# Patient Record
Sex: FEMALE | Race: AMERICAN INDIAN OR ALASKA NATIVE | NOT HISPANIC OR LATINO | Employment: FULL TIME | URBAN - METROPOLITAN AREA
[De-identification: names, ages, dates, MRNs, and addresses within clinical notes are randomized per-mention and may not be internally consistent; named-entity substitution may affect disease eponyms.]

---

## 2017-01-27 ENCOUNTER — APPOINTMENT (OUTPATIENT)
Dept: RADIOLOGY | Facility: MEDICAL CENTER | Age: 62
DRG: 481 | End: 2017-01-27
Attending: EMERGENCY MEDICINE
Payer: OTHER GOVERNMENT

## 2017-01-27 ENCOUNTER — RESOLUTE PROFESSIONAL BILLING HOSPITAL PROF FEE (OUTPATIENT)
Dept: HOSPITALIST | Facility: MEDICAL CENTER | Age: 62
End: 2017-01-27
Payer: OTHER GOVERNMENT

## 2017-01-27 ENCOUNTER — NON-PROVIDER VISIT (OUTPATIENT)
Dept: OCCUPATIONAL MEDICINE | Facility: CLINIC | Age: 62
End: 2017-01-27
Payer: OTHER GOVERNMENT

## 2017-01-27 ENCOUNTER — HOSPITAL ENCOUNTER (INPATIENT)
Facility: MEDICAL CENTER | Age: 62
LOS: 10 days | DRG: 481 | End: 2017-02-06
Attending: EMERGENCY MEDICINE | Admitting: INTERNAL MEDICINE
Payer: OTHER GOVERNMENT

## 2017-01-27 ENCOUNTER — HOSPITAL ENCOUNTER (OUTPATIENT)
Dept: RADIOLOGY | Facility: MEDICAL CENTER | Age: 62
End: 2017-01-27

## 2017-01-27 ENCOUNTER — APPOINTMENT (OUTPATIENT)
Dept: OCCUPATIONAL MEDICINE | Facility: CLINIC | Age: 62
End: 2017-01-27
Payer: COMMERCIAL

## 2017-01-27 DIAGNOSIS — Z02.83 ENCOUNTER FOR DRUG SCREENING: ICD-10-CM

## 2017-01-27 DIAGNOSIS — S72.92XA CLOSED FRACTURE OF LEFT FEMUR, UNSPECIFIED FRACTURE MORPHOLOGY, UNSPECIFIED PORTION OF FEMUR, INITIAL ENCOUNTER (HCC): ICD-10-CM

## 2017-01-27 PROBLEM — S72.409A FRACTURE OF DISTAL FEMUR (HCC): Status: ACTIVE | Noted: 2017-01-27

## 2017-01-27 LAB
ABO GROUP BLD: NORMAL
ABO GROUP BLD: NORMAL
ANION GAP SERPL CALC-SCNC: 6 MMOL/L (ref 0–11.9)
APTT PPP: 28.3 SEC (ref 24.7–36)
BASOPHILS # BLD AUTO: 0.4 % (ref 0–1.8)
BASOPHILS # BLD: 0.04 K/UL (ref 0–0.12)
BLD GP AB SCN SERPL QL: NORMAL
BUN SERPL-MCNC: 11 MG/DL (ref 8–22)
CALCIUM SERPL-MCNC: 9 MG/DL (ref 8.5–10.5)
CHLORIDE SERPL-SCNC: 105 MMOL/L (ref 96–112)
CO2 SERPL-SCNC: 27 MMOL/L (ref 20–33)
CREAT SERPL-MCNC: 0.59 MG/DL (ref 0.5–1.4)
EKG IMPRESSION: NORMAL
EOSINOPHIL # BLD AUTO: 0.1 K/UL (ref 0–0.51)
EOSINOPHIL NFR BLD: 0.9 % (ref 0–6.9)
ERYTHROCYTE [DISTWIDTH] IN BLOOD BY AUTOMATED COUNT: 42.1 FL (ref 35.9–50)
GFR SERPL CREATININE-BSD FRML MDRD: >60 ML/MIN/1.73 M 2
GLUCOSE BLD-MCNC: 196 MG/DL (ref 65–99)
GLUCOSE SERPL-MCNC: 187 MG/DL (ref 65–99)
HCT VFR BLD AUTO: 43.8 % (ref 37–47)
HGB BLD-MCNC: 14.3 G/DL (ref 12–16)
IMM GRANULOCYTES # BLD AUTO: 0.05 K/UL (ref 0–0.11)
IMM GRANULOCYTES NFR BLD AUTO: 0.5 % (ref 0–0.9)
INR PPP: 0.94 (ref 0.87–1.13)
LYMPHOCYTES # BLD AUTO: 2.66 K/UL (ref 1–4.8)
LYMPHOCYTES NFR BLD: 25 % (ref 22–41)
MCH RBC QN AUTO: 28.9 PG (ref 27–33)
MCHC RBC AUTO-ENTMCNC: 32.6 G/DL (ref 33.6–35)
MCV RBC AUTO: 88.7 FL (ref 81.4–97.8)
MONOCYTES # BLD AUTO: 0.67 K/UL (ref 0–0.85)
MONOCYTES NFR BLD AUTO: 6.3 % (ref 0–13.4)
NEUTROPHILS # BLD AUTO: 7.14 K/UL (ref 2–7.15)
NEUTROPHILS NFR BLD: 66.9 % (ref 44–72)
NRBC # BLD AUTO: 0 K/UL
NRBC BLD AUTO-RTO: 0 /100 WBC
PLATELET # BLD AUTO: 215 K/UL (ref 164–446)
PMV BLD AUTO: 10.7 FL (ref 9–12.9)
POTASSIUM SERPL-SCNC: 3.8 MMOL/L (ref 3.6–5.5)
PROTHROMBIN TIME: 12.9 SEC (ref 12–14.6)
RBC # BLD AUTO: 4.94 M/UL (ref 4.2–5.4)
RH BLD: NORMAL
SODIUM SERPL-SCNC: 138 MMOL/L (ref 135–145)
TSH SERPL DL<=0.005 MIU/L-ACNC: 1.59 UIU/ML (ref 0.3–3.7)
WBC # BLD AUTO: 10.7 K/UL (ref 4.8–10.8)

## 2017-01-27 PROCEDURE — 99285 EMERGENCY DEPT VISIT HI MDM: CPT

## 2017-01-27 PROCEDURE — 770006 HCHG ROOM/CARE - MED/SURG/GYN SEMI*

## 2017-01-27 PROCEDURE — 93005 ELECTROCARDIOGRAM TRACING: CPT | Performed by: EMERGENCY MEDICINE

## 2017-01-27 PROCEDURE — 71010 DX-CHEST-PORTABLE (1 VIEW): CPT

## 2017-01-27 PROCEDURE — 99223 1ST HOSP IP/OBS HIGH 75: CPT | Performed by: INTERNAL MEDICINE

## 2017-01-27 PROCEDURE — 80048 BASIC METABOLIC PNL TOTAL CA: CPT

## 2017-01-27 PROCEDURE — 82962 GLUCOSE BLOOD TEST: CPT

## 2017-01-27 PROCEDURE — A9270 NON-COVERED ITEM OR SERVICE: HCPCS | Performed by: INTERNAL MEDICINE

## 2017-01-27 PROCEDURE — 73700 CT LOWER EXTREMITY W/O DYE: CPT | Mod: LT

## 2017-01-27 PROCEDURE — 86901 BLOOD TYPING SEROLOGIC RH(D): CPT

## 2017-01-27 PROCEDURE — 84443 ASSAY THYROID STIM HORMONE: CPT

## 2017-01-27 PROCEDURE — 96375 TX/PRO/DX INJ NEW DRUG ADDON: CPT

## 2017-01-27 PROCEDURE — 85025 COMPLETE CBC W/AUTO DIFF WBC: CPT

## 2017-01-27 PROCEDURE — 86850 RBC ANTIBODY SCREEN: CPT

## 2017-01-27 PROCEDURE — 86900 BLOOD TYPING SEROLOGIC ABO: CPT

## 2017-01-27 PROCEDURE — 96374 THER/PROPH/DIAG INJ IV PUSH: CPT

## 2017-01-27 PROCEDURE — 700102 HCHG RX REV CODE 250 W/ 637 OVERRIDE(OP): Performed by: INTERNAL MEDICINE

## 2017-01-27 PROCEDURE — 85730 THROMBOPLASTIN TIME PARTIAL: CPT

## 2017-01-27 PROCEDURE — 36415 COLL VENOUS BLD VENIPUNCTURE: CPT

## 2017-01-27 PROCEDURE — 700101 HCHG RX REV CODE 250: Performed by: INTERNAL MEDICINE

## 2017-01-27 PROCEDURE — 304561 HCHG STAT O2

## 2017-01-27 PROCEDURE — 700111 HCHG RX REV CODE 636 W/ 250 OVERRIDE (IP): Performed by: EMERGENCY MEDICINE

## 2017-01-27 PROCEDURE — 99026 IN-HOSPITAL ON CALL SERVICE: CPT | Performed by: INTERNAL MEDICINE

## 2017-01-27 PROCEDURE — 85610 PROTHROMBIN TIME: CPT

## 2017-01-27 PROCEDURE — 304562 HCHG STAT O2 MASK/CANNULA

## 2017-01-27 PROCEDURE — 82075 ASSAY OF BREATH ETHANOL: CPT | Performed by: INTERNAL MEDICINE

## 2017-01-27 RX ORDER — SODIUM CHLORIDE AND POTASSIUM CHLORIDE 150; 900 MG/100ML; MG/100ML
INJECTION, SOLUTION INTRAVENOUS CONTINUOUS
Status: DISCONTINUED | OUTPATIENT
Start: 2017-01-27 | End: 2017-01-31

## 2017-01-27 RX ORDER — MORPHINE SULFATE 4 MG/ML
4 INJECTION, SOLUTION INTRAMUSCULAR; INTRAVENOUS ONCE
Status: COMPLETED | OUTPATIENT
Start: 2017-01-27 | End: 2017-01-27

## 2017-01-27 RX ORDER — LABETALOL HYDROCHLORIDE 5 MG/ML
10 INJECTION, SOLUTION INTRAVENOUS EVERY 4 HOURS PRN
Status: DISCONTINUED | OUTPATIENT
Start: 2017-01-27 | End: 2017-01-31

## 2017-01-27 RX ORDER — MORPHINE SULFATE 10 MG/ML
5 INJECTION, SOLUTION INTRAMUSCULAR; INTRAVENOUS EVERY 4 HOURS PRN
Status: DISCONTINUED | OUTPATIENT
Start: 2017-01-27 | End: 2017-01-31

## 2017-01-27 RX ORDER — PROMETHAZINE HYDROCHLORIDE 25 MG/1
12.5-25 SUPPOSITORY RECTAL EVERY 4 HOURS PRN
Status: DISCONTINUED | OUTPATIENT
Start: 2017-01-27 | End: 2017-02-06 | Stop reason: HOSPADM

## 2017-01-27 RX ORDER — ONDANSETRON 4 MG/1
4 TABLET, ORALLY DISINTEGRATING ORAL EVERY 4 HOURS PRN
Status: DISCONTINUED | OUTPATIENT
Start: 2017-01-27 | End: 2017-02-06 | Stop reason: HOSPADM

## 2017-01-27 RX ORDER — AMLODIPINE BESYLATE 10 MG/1
10 TABLET ORAL DAILY
Status: DISCONTINUED | OUTPATIENT
Start: 2017-01-28 | End: 2017-01-31

## 2017-01-27 RX ORDER — DOCUSATE SODIUM 100 MG/1
100 CAPSULE, LIQUID FILLED ORAL EVERY MORNING
Status: DISCONTINUED | OUTPATIENT
Start: 2017-01-28 | End: 2017-02-01

## 2017-01-27 RX ORDER — ONDANSETRON 2 MG/ML
4 INJECTION INTRAMUSCULAR; INTRAVENOUS EVERY 4 HOURS PRN
Status: DISCONTINUED | OUTPATIENT
Start: 2017-01-27 | End: 2017-02-06 | Stop reason: HOSPADM

## 2017-01-27 RX ORDER — DEXTROSE MONOHYDRATE 25 G/50ML
25 INJECTION, SOLUTION INTRAVENOUS
Status: DISCONTINUED | OUTPATIENT
Start: 2017-01-27 | End: 2017-02-06 | Stop reason: HOSPADM

## 2017-01-27 RX ORDER — NAPROXEN 500 MG/1
500 TABLET ORAL
Status: ON HOLD | COMMUNITY
End: 2017-01-31

## 2017-01-27 RX ORDER — LISINOPRIL 20 MG/1
40 TABLET ORAL DAILY
Status: DISCONTINUED | OUTPATIENT
Start: 2017-01-28 | End: 2017-02-06 | Stop reason: HOSPADM

## 2017-01-27 RX ORDER — IBUPROFEN 200 MG
600 TABLET ORAL EVERY 6 HOURS PRN
Status: ON HOLD | COMMUNITY
End: 2017-01-31

## 2017-01-27 RX ORDER — MORPHINE SULFATE 4 MG/ML
1-4 INJECTION, SOLUTION INTRAMUSCULAR; INTRAVENOUS EVERY 4 HOURS PRN
Status: DISCONTINUED | OUTPATIENT
Start: 2017-01-27 | End: 2017-01-31

## 2017-01-27 RX ORDER — ENEMA 19; 7 G/133ML; G/133ML
1 ENEMA RECTAL
Status: DISCONTINUED | OUTPATIENT
Start: 2017-01-27 | End: 2017-02-01

## 2017-01-27 RX ORDER — PROMETHAZINE HYDROCHLORIDE 25 MG/1
12.5-25 TABLET ORAL EVERY 4 HOURS PRN
Status: DISCONTINUED | OUTPATIENT
Start: 2017-01-27 | End: 2017-02-06 | Stop reason: HOSPADM

## 2017-01-27 RX ORDER — OXYCODONE HYDROCHLORIDE 5 MG/1
5 TABLET ORAL EVERY 4 HOURS PRN
Status: DISCONTINUED | OUTPATIENT
Start: 2017-01-27 | End: 2017-01-31

## 2017-01-27 RX ORDER — AMOXICILLIN 250 MG
1 CAPSULE ORAL NIGHTLY
Status: DISCONTINUED | OUTPATIENT
Start: 2017-01-27 | End: 2017-02-01

## 2017-01-27 RX ORDER — AMOXICILLIN 250 MG
1 CAPSULE ORAL
Status: DISCONTINUED | OUTPATIENT
Start: 2017-01-27 | End: 2017-02-01

## 2017-01-27 RX ORDER — ONDANSETRON 2 MG/ML
4 INJECTION INTRAMUSCULAR; INTRAVENOUS ONCE
Status: COMPLETED | OUTPATIENT
Start: 2017-01-27 | End: 2017-01-27

## 2017-01-27 RX ORDER — TIOTROPIUM BROMIDE 18 UG/1
18 CAPSULE ORAL; RESPIRATORY (INHALATION) DAILY
COMMUNITY
End: 2022-05-31

## 2017-01-27 RX ORDER — GLIMEPIRIDE 2 MG/1
2 TABLET ORAL 2 TIMES DAILY
Status: ON HOLD | COMMUNITY
End: 2017-01-31

## 2017-01-27 RX ORDER — INSULIN GLARGINE 100 [IU]/ML
10 INJECTION, SOLUTION SUBCUTANEOUS EVERY EVENING
Status: DISCONTINUED | OUTPATIENT
Start: 2017-01-27 | End: 2017-02-04

## 2017-01-27 RX ORDER — CALCIUM CARBONATE 500 MG/1
1000 TABLET, CHEWABLE ORAL DAILY
Status: DISCONTINUED | OUTPATIENT
Start: 2017-01-27 | End: 2017-02-06 | Stop reason: HOSPADM

## 2017-01-27 RX ORDER — BISACODYL 10 MG
10 SUPPOSITORY, RECTAL RECTAL
Status: DISCONTINUED | OUTPATIENT
Start: 2017-01-27 | End: 2017-02-01

## 2017-01-27 RX ORDER — GABAPENTIN 100 MG/1
100 CAPSULE ORAL
Status: DISCONTINUED | OUTPATIENT
Start: 2017-01-27 | End: 2017-02-06 | Stop reason: HOSPADM

## 2017-01-27 RX ORDER — ALBUTEROL SULFATE 90 UG/1
2 AEROSOL, METERED RESPIRATORY (INHALATION) EVERY 6 HOURS PRN
COMMUNITY
End: 2022-05-31

## 2017-01-27 RX ORDER — TIOTROPIUM BROMIDE 18 UG/1
1 CAPSULE ORAL; RESPIRATORY (INHALATION)
Status: DISCONTINUED | OUTPATIENT
Start: 2017-01-28 | End: 2017-02-06

## 2017-01-27 RX ORDER — MORPHINE SULFATE 4 MG/ML
1-5 INJECTION, SOLUTION INTRAMUSCULAR; INTRAVENOUS EVERY 4 HOURS PRN
Status: DISCONTINUED | OUTPATIENT
Start: 2017-01-27 | End: 2017-01-27

## 2017-01-27 RX ORDER — LACTULOSE 20 G/30ML
30 SOLUTION ORAL
Status: DISCONTINUED | OUTPATIENT
Start: 2017-01-27 | End: 2017-02-01

## 2017-01-27 RX ADMIN — ONDANSETRON 4 MG: 2 INJECTION, SOLUTION INTRAMUSCULAR; INTRAVENOUS at 16:45

## 2017-01-27 RX ADMIN — MORPHINE SULFATE 4 MG: 4 INJECTION INTRAVENOUS at 16:45

## 2017-01-27 RX ADMIN — INSULIN LISPRO 2 UNITS: 100 INJECTION, SOLUTION INTRAVENOUS; SUBCUTANEOUS at 21:29

## 2017-01-27 RX ADMIN — GABAPENTIN 100 MG: 100 CAPSULE ORAL at 21:26

## 2017-01-27 RX ADMIN — OXYCODONE HYDROCHLORIDE 5 MG: 5 TABLET ORAL at 18:45

## 2017-01-27 RX ADMIN — POTASSIUM CHLORIDE AND SODIUM CHLORIDE: 900; 150 INJECTION, SOLUTION INTRAVENOUS at 19:49

## 2017-01-27 RX ADMIN — Medication 1 TABLET: at 21:26

## 2017-01-27 RX ADMIN — VITAMIN D, TAB 1000IU (100/BT) 1000 UNITS: 25 TAB at 21:26

## 2017-01-27 RX ADMIN — ANTACID TABLETS 1000 MG: 500 TABLET, CHEWABLE ORAL at 21:26

## 2017-01-27 RX ADMIN — INSULIN GLARGINE 10 UNITS: 100 INJECTION, SOLUTION SUBCUTANEOUS at 21:27

## 2017-01-27 RX ADMIN — METOPROLOL TARTRATE 25 MG: 25 TABLET, FILM COATED ORAL at 21:26

## 2017-01-27 ASSESSMENT — PAIN SCALES - GENERAL
PAINLEVEL_OUTOF10: 8
PAINLEVEL_OUTOF10: 3
PAINLEVEL_OUTOF10: 4

## 2017-01-27 ASSESSMENT — LIFESTYLE VARIABLES
EVER_SMOKED: YES
DO YOU DRINK ALCOHOL: NO
DO YOU DRINK ALCOHOL: NO

## 2017-01-27 NOTE — MR AVS SNAPSHOT
Miley Whittington   2017 8:50 AM   Appointment   MRN: [id number]    Department:  [facility]   Dept Phone:  [phone]    Description:  Female : 1955   Provider:  OH NON RENOWN MA           Allergies as of 2017     Allergen Noted Reactions    Sulfa Drugs 2016   Anaphylaxis    Metformin 2017   Shortness of Breath      Vital Signs     Smoking Status                   Former Smoker           Basic Information     Date Of Birth Sex Race Ethnicity Preferred Language    1955 Female  or  Non- English      Problem List              ICD-10-CM Priority Class Noted - Resolved    Sepsis (CMS-HCC) A41.9   2016 - Present    JEN (acute kidney injury) (CMS-HCC) N17.9   2016 - Present    Fracture of distal femur (CMS-HCC) S72.409A   2017 - Present    Leukocytosis D72.829   2017 - Present    BMI 35.0-35.9,adult Z68.35   2017 - Present    DM (diabetes mellitus), type 2 (CMS-HCC) E11.9   2017 - Present    COPD (chronic obstructive pulmonary disease) (CMS-HCC) J44.9   2017 - Present    HTN (hypertension) I10   2017 - Present      Health Maintenance     Patient has no pending health maintenance at this time      Current Immunizations     13-VALENT PCV PREVNAR 2016  2:19 PM    Influenza Vaccine Adult HD 2016    Pneumococcal polysaccharide vaccine (PPSV-23) 2004      Below and/or attached are the medications your provider expects you to take. Review all of your home medications and newly ordered medications with your provider and/or pharmacist. Follow medication instructions as directed by your provider and/or pharmacist. Please keep your medication list with you and share with your provider. Update the information when medications are discontinued, doses are changed, or new medications (including over-the-counter products) are added; and carry medication information at all times in the event of emergency  situations     Allergies:  SULFA DRUGS - Anaphylaxis     METFORMIN - Shortness of Breath               Medications  Valid as of: January 30, 2017 - 11:04 AM    Generic Name Brand Name Tablet Size Instructions for use    Albuterol Sulfate (Aero Soln) albuterol 108 (90 BASE) MCG/ACT Inhale 2 Puffs by mouth every 6 hours as needed for Shortness of Breath.        AmLODIPine Besylate (Tab) NORVASC 10 MG Take 1 Tab by mouth every day.        Gabapentin (Cap) NEURONTIN 100 MG Take 100 mg by mouth every bedtime.        Glimepiride (Tab) AMARYL 2 MG Take 2 mg by mouth 2 times a day.        Ibuprofen (Tab) MOTRIN 200 MG Take 600 mg by mouth every 6 hours as needed for Mild Pain.        Insulin Detemir (Solution) LEVEMIR 100 UNIT/ML Inject 35 Units as instructed every evening.        Lisinopril (Tab) PRINIVIL, ZESTRIL 40 MG Take 1 Tab by mouth every day.        Metoprolol Tartrate   Take 2 Tabs by mouth 2 Times a Day.        Naproxen (Tab) NAPROSYN 500 MG Take 500 mg by mouth every bedtime.        Tiotropium Bromide Monohydrate (Cap) SPIRIVA 18 MCG Inhale 18 mcg by mouth every day.        .                 Medicines prescribed today were sent to:     None      Medication refill instructions:       If your prescription bottle indicates you have medication refills left, it is not necessary to call your provider’s office. Please contact your pharmacy and they will refill your medication.    If your prescription bottle indicates you do not have any refills left, you may request refills at any time through one of the following ways: The online Pa[employer].net system (except Urgent Care), by calling your provider’s office, or by asking your pharmacy to contact your provider’s office with a refill request. Medication refills are processed only during regular business hours and may not be available until the next business day. Your provider may request additional information or to have a follow-up visit with you prior to refilling your  medication.   *Please Note: Medication refills are assigned a new Rx number when refilled electronically. Your pharmacy may indicate that no refills were authorized even though a new prescription for the same medication is available at the pharmacy. Please request the medicine by name with the pharmacy before contacting your provider for a refill.           Rover Access Code: [email or web address]  Expires: 3/1/2017 11:04 AM    Rover  A secure, online tool to manage your health information     [employer]’s Rover® is a secure, online tool that connects you to your personalized health information from the privacy of your home -- day or night - making it very easy for you to manage your healthcare. Once the activation process is completed, you can even access your medical information using the Rover kimmy, which is available for free in the Apple Kimmy store or Google Play store.     Rover provides the following levels of access (as shown below):   My Chart Features   [facility] Primary Care Doctor [facility]  Specialists [facility]  Urgent  Care Non-[facility]  Primary Care  Doctor   Email your healthcare team securely and privately 24/7 X X X    Manage appointments: schedule your next appointment; view details of past/upcoming appointments X      Request prescription refills. X      View recent personal medical records, including lab and immunizations X X X X   View health record, including health history, allergies, medications X X X X   Read reports about your outpatient visits, procedures, consult and ER notes X X X X   See your discharge summary, which is a recap of your hospital and/or ER visit that includes your diagnosis, lab results, and care plan. X X       How to register for Rover:  1. Go to  [email or web address].  2. Click on the Sign Up Now box, which takes you to the New Member Sign Up page. You will need to provide the following information:  a. Enter your Rover Access Code exactly as it appears at the  top of this page. (You will not need to use this code after you’ve completed the sign-up process. If you do not sign up before the expiration date, you must request a new code.)   b. Enter your date of birth.   c. Enter your home email address.   d. Click Submit, and follow the next screen’s instructions.  3. Create a [employer] ID. This will be your [employer] login ID and cannot be changed, so think of one that is secure and easy to remember.  4. Create a [employer] password. You can change your password at any time.  5. Enter your Password Reset Question and Answer. This can be used at a later time if you forget your password.   6. Enter your e-mail address. This allows you to receive e-mail notifications when new information is available in [employer].  7. Click Sign Up. You can now view your health information.    For assistance activating your [employer] account, call [phone]

## 2017-01-27 NOTE — LETTER
"  FORM C-4:  EMPLOYEE’S CLAIM FOR COMPENSATION/ REPORT OF INITIAL TREATMENT  EMPLOYEE’S CLAIM - PROVIDE ALL INFORMATION REQUESTED   First Name  Miley Last Name  Nelsy Birthdate             Age  1955 61 y.o. Sex  Female Claim Number   Home Employee Address  [address]                                     Zip  [address] Height  1.6 m (5' 3\") Weight  90.719 kg (200 lb) [facility]     Mailing Employee Address                           [address] [place] [address]               Zip  [address] Telephone  [phone] (home)  Primary Language Spoken  ENGLISH   Insurer  Unable to Obtain Third Party   Jo-Ann Assigned Risk Employee's Occupation (Job Title) When Injury or Occupational Disease Occurred     Employer's Name  [employer] Telephone  [phone]    Employer Address  [address] [facility] Zip  [address]   Date of Injury         Hour of Injury   Date Employer Notified   Last Day of Work after Injury or Occupational Disease   Supervisor to Whom Injury Reported     Address or Location of Accident (if applicable)     What were you doing at the time of accident? (if applicable)      How did this injury or occupational disease occur? Be specific and answer in detail. Use additional sheet if necessary)     If you believe that you have an occupational disease, when did you first have knowledge of the disability and it relationship to your employment?   Witnesses to the Accident       Nature of Injury or Occupational Disease    Part(s) of Body Injured or Affected  , ,     I certify that the above is true and correct to the best of my knowledge and that I have provided this information in order to obtain the benefits of [place]  I hereby authorize any physician, chiropractor, surgeon, practitioner, or other person, " any hospital, including [facility] or [facility], any medical service organization, any insurance company, or other institution or organization to release to each other, any medical or other information, including benefits paid or payable, pertinent to this injury or disease, except information relative to diagnosis, treatment and/or counseling for AIDS, psychological conditions, alcohol or controlled substances, for which I must give specific authorization.  A Photostat of this authorization shall be as valid as the original.   Date Place   Employee’s Signature   THIS REPORT MUST BE COMPLETED AND MAILED WITHIN 3 WORKING DAYS OF TREATMENT   Place  [facility], EMERGENCY DEPT  Name of Facility   [facility]   Date  1/27/2017 Diagnosis  (S72.92XA) Closed fracture of left femur, unspecified fracture morphology, unspecified portion of femur, initial encounter ([facility]) Is there evidence the injured employee was under the influence of alcohol and/or another controlled substance at the time of accident?   Hour  4:52 PM Description of Injury or Disease  Closed fracture of left femur, unspecified fracture morphology, unspecified portion of femur, initial encounter ([facility]) No   Treatment  Surgery with Ortho service   Have you advised the patient to remain off work five days or more?         No   X-Ray Findings  Positive   If Yes   From Date    To Date      From information given by the employee, together with medical evidence, can you directly connect this injury or occupational disease as job incurred?  Yes If No, is the employee capable of: Full Duty  No Modified Duty      Is additional medical care by a physician indicated?  Yes If Modified Duty, Specify any Limitations / Restrictions        Do you know of any previous injury or disease contributing to this condition or occupational disease?  No   Date  1/27/2017 Print Doctor’s Name  Chaz Lui certify the  "employer’s copy of this form was mailed on:   Address  [address]  [place] [address] [phone] Insurer’s Use Only   [facility]  [id number]    Provider’s Tax ID Number  [id number] Telephone  Dept: [phone]    Doctor’s Signature  zan-CITLALY Cornelius M.D. Degree   M.D.    Original - TREATING PHYSICIAN OR CHIROPRACTOR   Pg 2-Insurer/TPA   Pg 3-Employer   Pg 4-Employee                                                                                                  Form C-4 (rev01/03)     BRIEF DESCRIPTION OF RIGHTS AND BENEFITS  (Pursuant to [place] 616C.[place])    Notice of Injury or Occupational Disease (Incident Report Form C-1): If an injury or occupational disease (OD) arises out of and in the course of employment, you must provide written notice to your employer as soon as practicable, but no later than 7 days after the accident or OD. Your employer shall maintain a sufficient supply of the required forms.    Claim for Compensation (Form C-4): If medical treatment is sought, the form C-4 is available at the place of initial treatment. A completed \"Claim for Compensation\" (Form C-4) must be filed within 90 days after an accident or OD. The treating physician or chiropractor must, within 3 working days after treatment, complete and mail to the employer, the employer's insurer and third-party , the Claim for Compensation.    Medical Treatment: If you require medical treatment for your on-the-job injury or OD, you may be required to select a physician or chiropractor from a list provided by your workers’ compensation insurer, if it has contracted with an Organization for Managed Care (MCO) or Preferred Provider Organization (PPO) or providers of health care. If your employer has not entered into a contract with an MCO or PPO, you may select a physician or chiropractor from the Panel of Physicians and Chiropractors. Any medical costs related to your industrial injury or OD will be " paid by your insurer.    Temporary Total Disability (TTD): If your doctor has certified that you are unable to work for a period of at least 5 consecutive days, or 5 cumulative days in a 20-day period, or places restrictions on you that your employer does not accommodate, you may be entitled to TTD compensation.    Temporary Partial Disability (TPD): If the wage you receive upon reemployment is less than the compensation for TTD to which you are entitled, the insurer may be required to pay you TPD compensation to make up the difference. TPD can only be paid for a maximum of 24 months.    Permanent Partial Disability (PPD): When your medical condition is stable and there is an indication of a PPD as a result of your injury or OD, within 30 days, your insurer must arrange for an evaluation by a rating physician or chiropractor to determine the degree of your PPD. The amount of your PPD award depends on the date of injury, the results of the PPD evaluation and your age and wage.    Permanent Total Disability (PTD): If you are medically certified by a treating physician or chiropractor as permanently and totally disabled and have been granted a PTD status by your insurer, you are entitled to receive monthly benefits not to exceed 66 2/3% of your average monthly wage. The amount of your PTD payments is subject to reduction if you previously received a PPD award.    Vocational Rehabilitation Services: You may be eligible for vocational rehabilitation services if you are unable to return to the job due to a permanent physical impairment or permanent restrictions as a result of your injury or occupational disease.    Transportation and Per Porsha Reimbursement: You may be eligible for travel expenses and per porsha associated with medical treatment.  Reopening: You may be able to reopen your claim if your condition worsens after claim closure.    Appeal Process: If you disagree with a written determination issued by the insurer  or the insurer does not respond to your request, you may appeal to the Department of Administration, , by following the instructions contained in your determination letter. You must appeal the determination within 70 days from the date of the determination letter at [address], [address], [place], [place] [address], or [address], [address], [place], [place] [address]. If you disagree with the  decision, you may appeal to the Department of Administration, . You must file your appeal within 30 days from the date of the  decision letter at [address], [address], [place], [place] [address], or [address], [place], [place] [address]. If you disagree with a decision of an , you may file a petition for judicial review with the District Court. You must do so within 30 days of the Appeal Officer’s decision. You may be represented by an  at your own expense or you may contact the [facility] for possible representation.    [place]  for Injured Workers (NAIW): If you disagree with a  decision, you may request that NAIW represent you without charge at an  Hearing. For information regarding denial of benefits, you may contact the [facility] at: [address], [address], [place] [address], [phone], or [address], [place] [address], [phone]    To File a Complaint with the Division: If you wish to file a complaint with the  of the Division of Industrial Relations (DIR), please contact the Workers’ Compensation Section, [address], [address], [place], [place] [address], telephone ([phone]) [phone], or [address], [place], [place] [address], telephone [phone].    For assistance with Workers’ Compensation Issues: you may contact the Office of the Governor Consumer Health  Assistance, [address], [address], [place], [place] [address], Toll Free 1-311.723.3296, Web site: [email or web address]., E-mail [email or web address]                                                                                                                                                                               __________________________________________________________________                                    _________________            Employee Name / Signature                                                                                                                            Date                                       D-2 (rev. 10/07)

## 2017-01-27 NOTE — ED PROVIDER NOTES
ED Provider Note    Scribed for Chaz Lui M.D. by Mrieya Oconnor. 1/27/2017, 3:43 PM.    Primary care provider: Neftaly Meehan M.D.  Means of arrival: [facility]  History obtained from: Patient  History limited by: None    CHIEF COMPLAINT  Chief Complaint   Patient presents with   • T-5000 GLF     slipped on ice at the car wash. [place] a pop when she fell and couldn't get back up.   • Leg Pain     left       HPI  Miley Whittington is a 61 y.o. female who presents to the Emergency Department brought in by [facility] as a transfer status post ground level fall complaining of left knee pain, onset a couple of hours ago. The patient states that she was at the car wash and slipped on ice. She did hear a pop when she fell and she was unable to stand up after the fall. The patient denies any head trauma or loss of consciousness, hip pain, or ankle pain. The patient has a history of diabetes and hypertension.    REVIEW OF SYSTEMS  See HPI for further details. All other systems are negative.     PAST MEDICAL HISTORY   has a past medical history of Diabetes; Hypertension; and Hyperlipidemia.    SURGICAL HISTORY   has past surgical history that includes cholecystectomy.    SOCIAL HISTORY  Social History   Substance Use Topics   • Smoking status: Heavy Tobacco Smoker -- 0.50 packs/day     Types: Cigarettes   • Smokeless tobacco: Never Used   • Alcohol Use: No      History   Drug Use No       FAMILY HISTORY  No family history noted    CURRENT MEDICATIONS  No current facility-administered medications on file prior to encounter.     Current Outpatient Prescriptions on File Prior to Encounter   Medication Sig Dispense Refill   • metformin (GLUCOPHAGE) 500 MG Tab Take 1 Tab by mouth 2 times a day, with meals. 60 Tab 0   • lisinopril (PRINIVIL, ZESTRIL) 40 MG tablet Take 1 Tab by mouth every day. 30 Tab 0   • metoprolol (LOPRESSOR) 100 MG Tab Take 1 Tab by mouth 2 Times a Day. 60 Tab 0   • amlodipine (NORVASC) 10 MG Tab Take 1 Tab  "by mouth every day. 30 Tab 0   • insulin detemir (LEVEMIR) 100 UNIT/ML Solution Inject 30 Units as instructed every bedtime. 10 mL 0   • gabapentin (NEURONTIN) 100 MG Cap Take 100 mg by mouth every day.     • Tiotropium Bromide Monohydrate (SPIRIVA RESPIMAT INH) Inhale 1 Puff by mouth 2 Times a Day.     Reviewed. See Encounter Summary.     ALLERGIES  Allergies   Allergen Reactions   • Sulfa Drugs Anaphylaxis       PHYSICAL EXAM  VITAL SIGNS: /70 mmHg  Pulse 55  Temp(Src) 36 °C (96.8 °F)  Resp 15  Ht 1.6 m (5' 3\")  Wt 90.719 kg (200 lb)  BMI 35.44 kg/m2  SpO2 100%   Pulse ox interpretation: I interpret this pulse ox as normal.  Constitutional: Alert in no apparent distress.  HENT: No signs of trauma, Bilateral external ears normal, Nose normal.   Eyes: Pupils are equal and reactive, Conjunctiva normal, Non-icteric.   Neck: Normal range of motion, No tenderness, Supple, No stridor. No JVD.  Lymphatic: No lymphadenopathy noted.   Cardiovascular: Regular rate and rhythm, no murmurs.   Thorax & Lungs: Normal breath sounds, No respiratory distress, No wheezing, No chest tenderness.   Abdomen: Bowel sounds normal, Soft, No tenderness, No masses, No pulsatile masses. No peritoneal signs.  Skin: Warm, Dry, No erythema, No rash.   Back: No bony tenderness, No CVA tenderness.   Extremities: Intact distal pulses, No edema, No tenderness,   Musculoskeletal: Good range of motion in all major joints. No tenderness to palpation or major deformities noted.   Neurologic: Alert , Normal motor function, Normal sensory function, No focal deficits noted.   Psychiatric: Affect normal, Judgment normal, Mood normal.     DIAGNOSTIC STUDIES / PROCEDURES     LABS  Results for orders placed or performed during the hospital encounter of 01/27/17   CBC WITH DIFFERENTIAL   Result Value Ref Range    WBC 10.7 4.8 - 10.8 K/uL    RBC 4.94 4.20 - 5.40 M/uL    Hemoglobin 14.3 12.0 - 16.0 g/dL    Hematocrit 43.8 37.0 - 47.0 %    MCV 88.7 " 81.4 - 97.8 fL    MCH 28.9 27.0 - 33.0 pg    MCHC 32.6 (L) 33.6 - 35.0 g/dL    RDW 42.1 35.9 - 50.0 fL    Platelet Count 215 164 - 446 K/uL    MPV 10.7 9.0 - 12.9 fL    Neutrophils-Polys 66.90 44.00 - 72.00 %    Lymphocytes 25.00 22.00 - 41.00 %    Monocytes 6.30 0.00 - 13.40 %    Eosinophils 0.90 0.00 - 6.90 %    Basophils 0.40 0.00 - 1.80 %    Immature Granulocytes 0.50 0.00 - 0.90 %    Nucleated RBC 0.00 /100 WBC    Neutrophils (Absolute) 7.14 2.00 - 7.15 K/uL    Lymphs (Absolute) 2.66 1.00 - 4.80 K/uL    Monos (Absolute) 0.67 0.00 - 0.85 K/uL    Eos (Absolute) 0.10 0.00 - 0.51 K/uL    Baso (Absolute) 0.04 0.00 - 0.12 K/uL    Immature Granulocytes (abs) 0.05 0.00 - 0.11 K/uL    NRBC (Absolute) 0.00 K/uL   EKG (ER)   Result Value Ref Range    Report       [facility] Emergency Dept.    Test Date:  2017  Pt Name:    SUELLEN HERR                  Department: ER  MRN:        [id number]                      Room:        13  Gender:     F                            Technician: [id number]  :        1955                   Requested By:CITLALY LUNA  Order #:    [id number]                    Reading MD:    Measurements  Intervals                                Axis  Rate:       57                           P:          5  OR:         160                          QRS:        -22  QRSD:       80                           T:          3  QT:         428  QTc:        417    Interpretive Statements  SINUS BRADYCARDIA  PROBABLE INFERIOR INFARCT, AGE INDETERMINATE  Compared to ECG 2016 21:26:31  Sinus rhythm no longer present  Left ventricular hypertrophy no longer present  Myocardial infarct finding still present     All labs were reviewed by me.    EKG  EKG Sinus rhythm. No STEMI.     RADIOLOGY  DX-CHEST-PORTABLE (1 VIEW)   Final Result      No acute cardiac or pulmonary abnormality is noted.      OUTSIDE IMAGES-DX LOWER EXTREMITY, LEFT   Preliminary Result      CT-EXTREMITY, LOWER W/O LEFT     (Results Pending)   The radiologist's interpretation of all radiological studies have been reviewed by me.    COURSE & MEDICAL DECISION MAKING  Pertinent Labs & Imaging studies reviewed. (See chart for details)    **Neurovascular intact. D/w ortho staff and requests immobilizer and CT imaging. Will plan for OR tomorrow.     Reviewed patient's medical records from transferring facility which showed the DX left knee showed a distal femur gross displaced and angulated mildly comminuted fracture.     3:43 PM Patient seen and examined at bedside. Ordered for DX chest, prothrombin time, PTT, COD, CBC with differential, BMP, and EKG to evaluate. The patient was informed that the transferring facility performed images that showed a femur fracture. It was discussed with the patient that she will most likely be admitted to the hospital and will require surgery. She was informed that a CT may need to be ordered to evaluate after the case is discussed with the orthopedic surgeon. She was further informed that general lab work will be ordered. She understood and verbalized agreement.    4:11 PM Discussed patient's presentation and findings with Dr. Carlos, orthopedic surgery and they will consult on the patient. Recommend to order a CT, admit to medicine, and place her in an immobilizer.     4:41 PM  Discussed patient's presentation and findings with Dr. Genao and patient admitted to their service for further evaluation and treatment. Patient without any acute distress at time of admission.       DISPOSITION:  Patient will be admitted to Dr. Genao in guarded condition.    FINAL IMPRESSION  1. Closed fracture of left femur, unspecified fracture morphology, unspecified portion of femur, initial encounter ([facility])          Mireya DIETZ (Scribe), am scribing for, and in the presence of, Chaz Lui M.D..    Electronically signed by: Mireya Oconnor (Davidib[place]), 1/27/2017    Chaz DIETZ M.D. personally performed the  services described in this documentation, as scribed by Mireya Oconnor in my presence, and it is both accurate and complete.    The note accurately reflects work and decisions made by me.  Chaz Lui  1/27/2017  5:31 PM

## 2017-01-27 NOTE — LETTER
"  FORM C-4:  EMPLOYEE’S CLAIM FOR COMPENSATION/ REPORT OF INITIAL TREATMENT  EMPLOYEE’S CLAIM - PROVIDE ALL INFORMATION REQUESTED   First Name  Miley Last Name  Nelsy Birthdate             Age  1955 61 y.o. 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 [facility]  [address]   Date of Injury  1/27/2017       Hour of Injury  11:45 AM Date Employer Notified  1/27/2017 Last Day of Work after Injury or Occupational Disease  1/27/2017 Supervisor to Whom Injury Reported  Ho Mcgarry   Address or Location of Accident (if applicable)  [employer] [address] [place] [address]   What were you doing at the time of accident? (if applicable)  Got out of car to use vacuum. Slipped and fell.    How did this injury or occupational disease occur? Be specific and answer in detail. Use additional sheet if necessary)  I was cleaning a company vehicle. It was washed, I was walking around the vehicle to access vacuum when I slipped on ice and fell.   If you believe that you have an occupational disease, when did you first have knowledge of the disability and it relationship to your employment?  N/A Witnesses to the Accident  [address]     Nature of Injury or Occupational Disease  Contusion  Part(s) of Body Injured or Affected  Knee (L), N/A, N/A    I " certify that the above is true and correct to the best of my knowledge and that I have provided this information in order to obtain the benefits of [place]  I hereby authorize any physician, chiropractor, surgeon, practitioner, or other person, any hospital, including [facility] or [facility], any medical service organization, any insurance company, or other institution or organization to release to each other, any medical or other information, including benefits paid or payable, pertinent to this injury or disease, except information relative to diagnosis, treatment and/or counseling for AIDS, psychological conditions, alcohol or controlled substances, for which I must give specific authorization.  A Photostat of this authorization shall be as valid as the original.   Date  01/27/2017 [facility] Employee’s Signature   THIS REPORT MUST BE COMPLETED AND MAILED WITHIN 3 WORKING DAYS OF TREATMENT   Place  [facility], EMERGENCY DEPT  Name of Facility   [facility]   Date  1/27/2017 Diagnosis  (S72.92XA) Closed fracture of left femur, unspecified fracture morphology, unspecified portion of femur, initial encounter ([facility]) Is there evidence the injured employee was under the influence of alcohol and/or another controlled substance at the time of accident?   Hour  5:04 PM Description of Injury or Disease  Closed fracture of left femur, unspecified fracture morphology, unspecified portion of femur, initial encounter ([facility]) No   Treatment  Surgery with Ortho service   Have you advised the patient to remain off work five days or more?         No   X-Ray Findings  Positive   If Yes   From Date    To Date      From information given by the employee, together with medical evidence, can you directly connect this injury or occupational  "disease as job incurred?  Yes If No, is the employee capable of: Full Duty  No Modified Duty      Is additional medical care by a physician indicated?  Yes If Modified Duty, Specify any Limitations / Restrictions        Do you know of any previous injury or disease contributing to this condition or occupational disease?  No   Date  1/27/2017 Print Doctor’s Name  Citlaly Lui certify the employer’s copy of this form was mailed on:   Address  [address] [place] [id number] [phone] Insurer’s Use Only   [facility]  [address]    Provider’s Tax ID Number  [id number] Telephone  Dept: [phone]    Doctor’s Signature  e-CITLALY Cornelius M.D. Degree   M.D.    Original - TREATING PHYSICIAN OR CHIROPRACTOR   Pg 2-Insurer/TPA   Pg 3-Employer   Pg 4-Employee                                                                                                  Form C-4 (rev01/03)     BRIEF DESCRIPTION OF RIGHTS AND BENEFITS  (Pursuant to [place] 616C.050)    Notice of Injury or Occupational Disease (Incident Report Form C-1): If an injury or occupational disease (OD) arises out of and in the course of employment, you must provide written notice to your employer as soon as practicable, but no later than 7 days after the accident or OD. Your employer shall maintain a sufficient supply of the required forms.    Claim for Compensation (Form C-4): If medical treatment is sought, the form C-4 is available at the place of initial treatment. A completed \"Claim for Compensation\" (Form C-4) must be filed within 90 days after an accident or OD. The treating physician or chiropractor must, within 3 working days after treatment, complete and mail to the employer, the employer's insurer and third-party , the Claim for Compensation.    Medical Treatment: If you require medical treatment for your on-the-job injury or OD, you may be required to select a physician or chiropractor from a list provided by your " workers’ compensation insurer, if it has contracted with an Organization for Managed Care (MCO) or Preferred Provider Organization (PPO) or providers of health care. If your employer has not entered into a contract with an MCO or PPO, you may select a physician or chiropractor from the Panel of Physicians and Chiropractors. Any medical costs related to your industrial injury or OD will be paid by your insurer.    Temporary Total Disability (TTD): If your doctor has certified that you are unable to work for a period of at least 5 consecutive days, or 5 cumulative days in a 20-day period, or places restrictions on you that your employer does not accommodate, you may be entitled to TTD compensation.    Temporary Partial Disability (TPD): If the wage you receive upon reemployment is less than the compensation for TTD to which you are entitled, the insurer may be required to pay you TPD compensation to make up the difference. TPD can only be paid for a maximum of 24 months.    Permanent Partial Disability (PPD): When your medical condition is stable and there is an indication of a PPD as a result of your injury or OD, within 30 days, your insurer must arrange for an evaluation by a rating physician or chiropractor to determine the degree of your PPD. The amount of your PPD award depends on the date of injury, the results of the PPD evaluation and your age and wage.    Permanent Total Disability (PTD): If you are medically certified by a treating physician or chiropractor as permanently and totally disabled and have been granted a PTD status by your insurer, you are entitled to receive monthly benefits not to exceed 66 2/3% of your average monthly wage. The amount of your PTD payments is subject to reduction if you previously received a PPD award.    Vocational Rehabilitation Services: You may be eligible for vocational rehabilitation services if you are unable to return to the job due to a permanent physical impairment  or permanent restrictions as a result of your injury or occupational disease.    Transportation and Per Porsha Reimbursement: You may be eligible for travel expenses and per porsha associated with medical treatment.  Reopening: You may be able to reopen your claim if your condition worsens after claim closure.    Appeal Process: If you disagree with a written determination issued by the insurer or the insurer does not respond to your request, you may appeal to the Department of Administration, , by following the instructions contained in your determination letter. You must appeal the determination within 70 days from the date of the determination letter at [address], [address], [place], [place] [address], or [address], [address], [place], [place] [address]. If you disagree with the  decision, you may appeal to the Department of Administration, . You must file your appeal within 30 days from the date of the  decision letter at [address], [address], [place], [place] [address], or [address], [address], [place], [place] [address]. If you disagree with a decision of an , you may file a petition for judicial review with the District Court. You must do so within 30 days of the Appeal Officer’s decision. You may be represented by an  at your own expense or you may contact the [facility] for possible representation.    [place]  for Injured Workers (NAIW): If you disagree with a  decision, you may request that NAIW represent you without charge at an  Hearing. For information regarding denial of benefits, you may contact the [facility] at: [address], [address], [place] [address], [phone]) [phone], or [address], [address], [place] [address], [phone]    To File a Complaint with the Division: If you wish to file a complaint with the  of the  Division of Industrial Relations (DIR), please contact the Workers’ Compensation Section, [address], [address], [place], [place] [address], telephone (547) 132-1477, or [address], [address], [place], [place] [address], telephone [phone]) [phone].    For assistance with Workers’ Compensation Issues: you may contact the Office of the [facility] Consumer Health Assistance, [address] [place] [address], [address], [place], [place] [address], Toll Free 1-704.633.6262, Web site: http://[employer].[facility].nv., E-mail rupa@[email or web address].[email or web address].                                                                                                                                                                               __________________________________________________________________                                       01/27/2017                 Employee Name / Signature                                                                                                                            Date                                       D-2 (rev. 10/07)

## 2017-01-27 NOTE — ED NOTES
Received pt report from [facility]. Pt complains   Chief Complaint   Patient presents with   • T-5000 GLF     slipped on ice at the car wash. [place] a pop when she fell and couldn't get back up.   • Leg Pain     left   Pt currently in traction splint. CMS intact. ERP at bedside.

## 2017-01-28 ENCOUNTER — APPOINTMENT (OUTPATIENT)
Dept: RADIOLOGY | Facility: MEDICAL CENTER | Age: 62
DRG: 481 | End: 2017-01-28
Attending: ORTHOPAEDIC SURGERY
Payer: OTHER GOVERNMENT

## 2017-01-28 PROBLEM — J44.9 COPD (CHRONIC OBSTRUCTIVE PULMONARY DISEASE) (HCC): Status: ACTIVE | Noted: 2017-01-28

## 2017-01-28 PROBLEM — D72.829 LEUKOCYTOSIS: Status: ACTIVE | Noted: 2017-01-28

## 2017-01-28 PROBLEM — E11.9 DM (DIABETES MELLITUS), TYPE 2 (HCC): Status: ACTIVE | Noted: 2017-01-28

## 2017-01-28 PROBLEM — I10 HTN (HYPERTENSION): Status: ACTIVE | Noted: 2017-01-28

## 2017-01-28 LAB
ANION GAP SERPL CALC-SCNC: 4 MMOL/L (ref 0–11.9)
BUN SERPL-MCNC: 12 MG/DL (ref 8–22)
CALCIUM SERPL-MCNC: 9.1 MG/DL (ref 8.5–10.5)
CHLORIDE SERPL-SCNC: 105 MMOL/L (ref 96–112)
CO2 SERPL-SCNC: 28 MMOL/L (ref 20–33)
CREAT SERPL-MCNC: 0.73 MG/DL (ref 0.5–1.4)
ERYTHROCYTE [DISTWIDTH] IN BLOOD BY AUTOMATED COUNT: 43.8 FL (ref 35.9–50)
EST. AVERAGE GLUCOSE BLD GHB EST-MCNC: 183 MG/DL
GFR SERPL CREATININE-BSD FRML MDRD: >60 ML/MIN/1.73 M 2
GLUCOSE BLD-MCNC: 121 MG/DL (ref 65–99)
GLUCOSE BLD-MCNC: 125 MG/DL (ref 65–99)
GLUCOSE BLD-MCNC: 126 MG/DL (ref 65–99)
GLUCOSE BLD-MCNC: 165 MG/DL (ref 65–99)
GLUCOSE BLD-MCNC: 180 MG/DL (ref 65–99)
GLUCOSE SERPL-MCNC: 173 MG/DL (ref 65–99)
HBA1C MFR BLD: 8 % (ref 0–5.6)
HCT VFR BLD AUTO: 38.5 % (ref 37–47)
HGB BLD-MCNC: 12.4 G/DL (ref 12–16)
MCH RBC QN AUTO: 29.2 PG (ref 27–33)
MCHC RBC AUTO-ENTMCNC: 32.2 G/DL (ref 33.6–35)
MCV RBC AUTO: 90.6 FL (ref 81.4–97.8)
PLATELET # BLD AUTO: 186 K/UL (ref 164–446)
PMV BLD AUTO: 10.5 FL (ref 9–12.9)
POTASSIUM SERPL-SCNC: 4.6 MMOL/L (ref 3.6–5.5)
RBC # BLD AUTO: 4.25 M/UL (ref 4.2–5.4)
SODIUM SERPL-SCNC: 137 MMOL/L (ref 135–145)
WBC # BLD AUTO: 14.3 K/UL (ref 4.8–10.8)

## 2017-01-28 PROCEDURE — C1713 ANCHOR/SCREW BN/BN,TIS/BN: HCPCS | Performed by: ORTHOPAEDIC SURGERY

## 2017-01-28 PROCEDURE — 501838 HCHG SUTURE GENERAL: Performed by: ORTHOPAEDIC SURGERY

## 2017-01-28 PROCEDURE — 501480 HCHG STOCKINETTE: Performed by: ORTHOPAEDIC SURGERY

## 2017-01-28 PROCEDURE — A9270 NON-COVERED ITEM OR SERVICE: HCPCS

## 2017-01-28 PROCEDURE — 700111 HCHG RX REV CODE 636 W/ 250 OVERRIDE (IP): Performed by: INTERNAL MEDICINE

## 2017-01-28 PROCEDURE — 500891 HCHG PACK, ORTHO MAJOR: Performed by: ORTHOPAEDIC SURGERY

## 2017-01-28 PROCEDURE — 160035 HCHG PACU - 1ST 60 MINS PHASE I: Performed by: ORTHOPAEDIC SURGERY

## 2017-01-28 PROCEDURE — 700102 HCHG RX REV CODE 250 W/ 637 OVERRIDE(OP): Performed by: INTERNAL MEDICINE

## 2017-01-28 PROCEDURE — 36415 COLL VENOUS BLD VENIPUNCTURE: CPT

## 2017-01-28 PROCEDURE — 73552 X-RAY EXAM OF FEMUR 2/>: CPT | Mod: LT

## 2017-01-28 PROCEDURE — 160036 HCHG PACU - EA ADDL 30 MINS PHASE I: Performed by: ORTHOPAEDIC SURGERY

## 2017-01-28 PROCEDURE — 160039 HCHG SURGERY MINUTES - EA ADDL 1 MIN LEVEL 3: Performed by: ORTHOPAEDIC SURGERY

## 2017-01-28 PROCEDURE — 160009 HCHG ANES TIME/MIN: Performed by: ORTHOPAEDIC SURGERY

## 2017-01-28 PROCEDURE — 160002 HCHG RECOVERY MINUTES (STAT): Performed by: ORTHOPAEDIC SURGERY

## 2017-01-28 PROCEDURE — A4606 OXYGEN PROBE USED W OXIMETER: HCPCS | Performed by: ORTHOPAEDIC SURGERY

## 2017-01-28 PROCEDURE — 80048 BASIC METABOLIC PNL TOTAL CA: CPT

## 2017-01-28 PROCEDURE — A9270 NON-COVERED ITEM OR SERVICE: HCPCS | Performed by: INTERNAL MEDICINE

## 2017-01-28 PROCEDURE — 700105 HCHG RX REV CODE 258

## 2017-01-28 PROCEDURE — 82962 GLUCOSE BLOOD TEST: CPT

## 2017-01-28 PROCEDURE — 85027 COMPLETE CBC AUTOMATED: CPT

## 2017-01-28 PROCEDURE — 160028 HCHG SURGERY MINUTES - 1ST 30 MINS LEVEL 3: Performed by: ORTHOPAEDIC SURGERY

## 2017-01-28 PROCEDURE — 502000 HCHG MISC OR IMPLANTS RC 0278: Performed by: ORTHOPAEDIC SURGERY

## 2017-01-28 PROCEDURE — 770001 HCHG ROOM/CARE - MED/SURG/GYN PRIV*

## 2017-01-28 PROCEDURE — 700111 HCHG RX REV CODE 636 W/ 250 OVERRIDE (IP)

## 2017-01-28 PROCEDURE — 83036 HEMOGLOBIN GLYCOSYLATED A1C: CPT

## 2017-01-28 PROCEDURE — 0QSC04Z REPOSITION LEFT LOWER FEMUR WITH INTERNAL FIXATION DEVICE, OPEN APPROACH: ICD-10-PCS | Performed by: ORTHOPAEDIC SURGERY

## 2017-01-28 PROCEDURE — 700102 HCHG RX REV CODE 250 W/ 637 OVERRIDE(OP)

## 2017-01-28 PROCEDURE — 502240 HCHG MISC OR SUPPLY RC 0272: Performed by: ORTHOPAEDIC SURGERY

## 2017-01-28 PROCEDURE — 99232 SBSQ HOSP IP/OBS MODERATE 35: CPT | Performed by: INTERNAL MEDICINE

## 2017-01-28 PROCEDURE — 700111 HCHG RX REV CODE 636 W/ 250 OVERRIDE (IP): Performed by: ORTHOPAEDIC SURGERY

## 2017-01-28 PROCEDURE — 700101 HCHG RX REV CODE 250

## 2017-01-28 PROCEDURE — 110382 HCHG SHELL REV 271: Performed by: ORTHOPAEDIC SURGERY

## 2017-01-28 PROCEDURE — 110371 HCHG SHELL REV 272: Performed by: ORTHOPAEDIC SURGERY

## 2017-01-28 PROCEDURE — 160048 HCHG OR STATISTICAL LEVEL 1-5: Performed by: ORTHOPAEDIC SURGERY

## 2017-01-28 DEVICE — IMPLANTABLE DEVICE: Type: IMPLANTABLE DEVICE | Status: FUNCTIONAL

## 2017-01-28 DEVICE — SCREW CORTEX SELF TAPPING LOCKING LARGE FRAGMENT SYSTEM 4.5 X 70MM (2TX4=8): Type: IMPLANTABLE DEVICE | Status: FUNCTIONAL

## 2017-01-28 DEVICE — SCREW CORTEX SELF TAPPING LOCKING LARGE FRAGMENT SYSTEM 4.5 X 60MM (2TX4=8): Type: IMPLANTABLE DEVICE | Status: FUNCTIONAL

## 2017-01-28 DEVICE — SCREW CORTEX SELF TAPPING NON-LOCKING LARGE FRAGMENT SYSTEM 4.5 X 85MM (2TX4=8): Type: IMPLANTABLE DEVICE | Status: FUNCTIONAL

## 2017-01-28 DEVICE — SCREW CORTEX SELF TAPPING LOCKING LARGE FRAGMENT SYSTEM 4.5 X 28MM (2TX6=12): Type: IMPLANTABLE DEVICE | Status: FUNCTIONAL

## 2017-01-28 RX ORDER — CEFAZOLIN SODIUM 2 G/100ML
2000 INJECTION, SOLUTION INTRAVENOUS EVERY 8 HOURS
Status: COMPLETED | OUTPATIENT
Start: 2017-01-28 | End: 2017-01-29

## 2017-01-28 RX ORDER — MIDAZOLAM HYDROCHLORIDE 1 MG/ML
INJECTION INTRAMUSCULAR; INTRAVENOUS
Status: DISCONTINUED
Start: 2017-01-28 | End: 2017-01-28

## 2017-01-28 RX ORDER — SODIUM CHLORIDE 9 MG/ML
INJECTION, SOLUTION INTRAVENOUS
Status: COMPLETED
Start: 2017-01-28 | End: 2017-01-28

## 2017-01-28 RX ORDER — OXYCODONE HCL 5 MG/5 ML
SOLUTION, ORAL ORAL
Status: COMPLETED
Start: 2017-01-28 | End: 2017-01-28

## 2017-01-28 RX ADMIN — OXYCODONE HYDROCHLORIDE 5 MG: 5 TABLET ORAL at 20:08

## 2017-01-28 RX ADMIN — Medication 1 TABLET: at 20:00

## 2017-01-28 RX ADMIN — GABAPENTIN 100 MG: 100 CAPSULE ORAL at 20:00

## 2017-01-28 RX ADMIN — AMLODIPINE BESYLATE 10 MG: 10 TABLET ORAL at 07:38

## 2017-01-28 RX ADMIN — OXYCODONE HYDROCHLORIDE 5 MG: 5 SOLUTION ORAL at 12:30

## 2017-01-28 RX ADMIN — OXYCODONE HYDROCHLORIDE 5 MG: 5 TABLET ORAL at 00:54

## 2017-01-28 RX ADMIN — INSULIN LISPRO 2 UNITS: 100 INJECTION, SOLUTION INTRAVENOUS; SUBCUTANEOUS at 20:00

## 2017-01-28 RX ADMIN — SODIUM CHLORIDE 500 ML: 9 INJECTION, SOLUTION INTRAVENOUS at 17:14

## 2017-01-28 RX ADMIN — CEFAZOLIN SODIUM 2000 MG: 2 INJECTION, SOLUTION INTRAVENOUS at 17:13

## 2017-01-28 RX ADMIN — OXYCODONE HYDROCHLORIDE 5 MG: 5 TABLET ORAL at 16:04

## 2017-01-28 RX ADMIN — FENTANYL CITRATE 25 MCG: 50 INJECTION, SOLUTION INTRAMUSCULAR; INTRAVENOUS at 12:40

## 2017-01-28 RX ADMIN — METOPROLOL TARTRATE 25 MG: 25 TABLET, FILM COATED ORAL at 20:06

## 2017-01-28 RX ADMIN — INSULIN LISPRO 2 UNITS: 100 INJECTION, SOLUTION INTRAVENOUS; SUBCUTANEOUS at 16:38

## 2017-01-28 RX ADMIN — FENTANYL CITRATE 25 MCG: 50 INJECTION, SOLUTION INTRAMUSCULAR; INTRAVENOUS at 12:30

## 2017-01-28 RX ADMIN — ENOXAPARIN SODIUM 40 MG: 100 INJECTION SUBCUTANEOUS at 20:00

## 2017-01-28 RX ADMIN — OXYCODONE HYDROCHLORIDE 5 MG: 5 TABLET ORAL at 07:41

## 2017-01-28 RX ADMIN — INSULIN GLARGINE 10 UNITS: 100 INJECTION, SOLUTION SUBCUTANEOUS at 20:00

## 2017-01-28 RX ADMIN — METOPROLOL TARTRATE 25 MG: 25 TABLET, FILM COATED ORAL at 07:38

## 2017-01-28 ASSESSMENT — ENCOUNTER SYMPTOMS
DIARRHEA: 0
NAUSEA: 0
VOMITING: 0
ABDOMINAL PAIN: 0
SHORTNESS OF BREATH: 0

## 2017-01-28 ASSESSMENT — PAIN SCALES - GENERAL
PAINLEVEL_OUTOF10: 5
PAINLEVEL_OUTOF10: 5
PAINLEVEL_OUTOF10: 6
PAINLEVEL_OUTOF10: 3
PAINLEVEL_OUTOF10: 3
PAINLEVEL_OUTOF10: 6
PAINLEVEL_OUTOF10: 8
PAINLEVEL_OUTOF10: 0
PAINLEVEL_OUTOF10: 4
PAINLEVEL_OUTOF10: 5
PAINLEVEL_OUTOF10: 4
PAINLEVEL_OUTOF10: 0
PAINLEVEL_OUTOF10: 4

## 2017-01-28 NOTE — PROGRESS NOTES
"Pt arrived to floor from PACU, calm, cooperative, family at bedside. RN and CNA completed initial assessments. Pt states pain managed at this time, pt is tolerating fluids denies n/v. Post op VS started. Pt has SCD's on for VTE. heels floated for skin integrity.RN encouraged CDB w/ \"I.S.\" will reinforce t/o day. Fall and safety precautions in place, pt uses call light appropriately. Hourly rounds ongoing, call light w/in reach.  "

## 2017-01-28 NOTE — CARE PLAN
Problem: Knowledge Deficit  Goal: Knowledge of disease process/condition, treatment plan, diagnostic tests, and medications will improve  Outcome: PROGRESSING AS EXPECTED  POC discussed with pt. Pt verbalized understanding, all questions answered at this time.    Problem: Pain Management  Goal: Pain level will decrease to patient’s comfort goal  Outcome: PROGRESSING AS EXPECTED  Pt medicated per MAR, reports adequate pain relief with oxy IR 5mg

## 2017-01-28 NOTE — ED NOTES
Spoke to Dr. Carlos regarding taking off traction and applying knee immobilized, no change in orders

## 2017-01-28 NOTE — ED NOTES
Med rec partially complete per patient.  Patient states she is unsure of strength of metoprolol, states she takes 2 tabs BID, last dose this am.  Will have tech follow up with [facility] for dose.  Patient denies taking antibiotics within last month.  Allergies reviewed.

## 2017-01-28 NOTE — PROGRESS NOTES
"Pt A&O x 4, calm, cooperative, pleasant affect. RN reviewed POC which is for continued pain mgt, surgery today. Pt has been NPO since midnight - sips w/ meds this a.m. Pre-op checklist completed. RN encouraged CDB w/ \"I.S.\" return demo given. Pt has SCD's on for VTE. Lovenox to begin post surgery. RN encouraged weight shifts while in bed for skin integrity, pt is independent w/ turns. Fall and safety precautions in place, pt uses call light appropriately. Hourly rounds ongoing, call light w/in reach.   "

## 2017-01-28 NOTE — CONSULTS
"1/27/2017    Miley Whittington is a 61 y.o. female who presents with a left distal femur fracture and is here for operative management. Patient denies numbness, parasthesias, loss of concousness or other trauma    Past Medical History   Diagnosis Date   • Diabetes (CMS-HCC)    • Hypertension    • Hyperlipidemia        Past Surgical History   Procedure Laterality Date   • Cholecystectomy         Medications  No current facility-administered medications on file prior to encounter.     Current Outpatient Prescriptions on File Prior to Encounter   Medication Sig Dispense Refill   • lisinopril (PRINIVIL, ZESTRIL) 40 MG tablet Take 1 Tab by mouth every day. 30 Tab 0   • amlodipine (NORVASC) 10 MG Tab Take 1 Tab by mouth every day. 30 Tab 0   • gabapentin (NEURONTIN) 100 MG Cap Take 100 mg by mouth every bedtime.         Allergies  Sulfa drugs and Metformin    ROS  Left leg pain. All other systems were reviewed and found to be negative    History reviewed. No pertinent family history.    Social History     Social History   • Marital Status: Single     Spouse Name: N/A   • Number of Children: N/A   • Years of Education: N/A     Social History Main Topics   • Smoking status: Former Smoker -- 0.50 packs/day     Types: Cigarettes     Quit date: 07/27/2016   • Smokeless tobacco: Never Used   • Alcohol Use: No   • Drug Use: No   • Sexual Activity: Not Asked     Other Topics Concern   • None     Social History Narrative       Physical Exam  Vitals  Blood pressure 142/71, pulse 58, temperature 36.3 °C (97.4 °F), resp. rate 18, height 1.6 m (5' 3\"), weight 90.719 kg (200 lb), SpO2 99 %.  General: Well Developed, Well Nourished, no acute distress  HEENT: Normocephalic, atraumatic  Eyes: Anicteric, PERRLA, EOMI  Neck: Supple, nontender, no masses  Lungs: CTA, no wheezes or crackles  Heart: RRR, no murmurs, rubs or gallops  Abdomen: Soft, NT, ND  Pelvis: Stable to AP and Lateral Compression  Skin: Intact, no open wounds  Extremities: left " leg pain and deformity  Neuro: NVI  Vascular: 2+DP/PT, Capillary refill <2 seconds    Radiographs:  DX-CHEST-PORTABLE (1 VIEW)   Final Result      No acute cardiac or pulmonary abnormality is noted.      OUTSIDE IMAGES-DX LOWER EXTREMITY, LEFT   Preliminary Result      CT-EXTREMITY, LOWER W/O LEFT    (Results Pending)       Laboratory Values  Recent Labs      01/27/17   1600   WBC  10.7   RBC  4.94   HEMOGLOBIN  14.3   HEMATOCRIT  43.8   MCV  88.7   MCH  28.9   MCHC  32.6*   RDW  42.1   PLATELETCT  215   MPV  10.7     Recent Labs      01/27/17   1600   SODIUM  138   POTASSIUM  3.8   CHLORIDE  105   CO2  27   GLUCOSE  187*   BUN  11     Recent Labs      01/27/17   1600   APTT  28.3   INR  0.94         Impression: Left distal Femur fracture    Plan:Operative intervention. Risks and benefits of surgery were discussed which include but are not limited to bleeding, infection, neurovascular damage, malunion, nonunion, instability, limb length discrepancy, DVT, PE, MI, Stroke and death. They understand these risks and wish to proceed.

## 2017-01-28 NOTE — ED NOTES
Traction splint taken off pt and knee mobilizer put into place on the left leg. CMS remains intact. Pt has motor and denies a loss of feeling to the left extremity. Pt medicated for pain per MAR prior to procedure. Pt family at bedside. Pt now waiting for CT.

## 2017-01-28 NOTE — H&P
REASON FOR ADMISSION:  Left distal femur fracture.    HISTORY OF PRESENT ILLNESS:  The patient is a 61-year-old female with diabetes   mellitus and high blood pressure.  She was washing her car at the [employer]   today and slipped on ice, unfortunately sustaining a left distal femur   fracture.  Dr. Ulysses Carlos has been consulted.  CT is pending.    The fall was strictly mechanical in nature.  She was in her normal state of   health leading up to it.  There has been no chest pain, cough, upper   respiratory infection, nausea, vomiting, diarrhea, constipation, dysuria,   black or bloody stools, focal neurologic deficit, skin rash, hearing changes,   vision changes, or easy bruising.    PAST MEDICAL HISTORY:  1.  Diabetes mellitus.  2.  Hypertension.  3.  Dyslipidemia.  4.  COPD.    PAST SURGICAL HISTORY:  Cholecystectomy.    SOCIAL HISTORY:  She quit smoking last year.  She is a nondrinker.  She is   followed by Dr. KIARA Meehan.    FAMILY HISTORY:  Reviewed, but noncontributory.    CURRENT MEDICATIONS:  1.  Norvasc 10 mg daily.  2.  Neurontin 100 mg 3 times daily.  3.  Levemir insulin 30 units nightly.  4.  Lisinopril 40 mg daily.  5.  Metformin 500 mg twice daily.  6.  Metoprolol 100 mg twice daily.  7.  Spiriva 1 puff daily.    ALLERGIES:  SULFA.    REVIEW OF SYSTEMS:  As in the HPI.    PHYSICAL EXAMINATION:  VITAL SIGNS:  Blood pressure 163/79, pulse 64, and respiratory rate 20.  She   is afebrile.  GENERAL:  She is alert, oriented, accompanied by her granddaughter.  HEENT:  Pupils are equal, round and reactive.  Extraocular movements intact.    Mucous membranes are moist.  NECK:  Supple without jugular venous distention, lymphadenopathy or bruit.  CARDIOVASCULAR:  She is regular without murmur, rub or gallop.  LUNGS:  Clear to auscultation bilaterally.  ABDOMEN:  Obese, soft, nontender, nondistended.  Bowel sounds are present.    There is no palpable organomegaly.  BACK:  No CVA tenderness.  EXTREMITIES:  Warm.   The left leg is in a splint, the foot is warm and pulses   are good distally.  NEUROLOGIC:  Nonfocal.    LABORATORY DATA:  White count 11, hemoglobin 14, hematocrit 44, mean cell   volume is 89, platelets 250, neutrophils 67%, lymphocytes 25%, monocytes 6%.    Sodium 138, potassium 3.8, chloride 105, bicarbonate 27, glucose 187, BUN 11,   creatinine 0.6, calcium 9.0.  INR 0.9.    STUDIES:  1.  Chest x-ray is negative.  2.  X-rays of the left femur note distal femur fracture.    IMPRESSION:  1.  Mechanical ground level fall.  2.  Distal left femur fracture.  3.  Diabetes mellitus, insulin dependent, poorly controlled.  4.  Hypertension, poorly controlled.  5.  Dyslipidemia.  6.  Chronic obstructive pulmonary disease.  7.  Obesity.    PLAN:  1.  For the left femur fracture.  She will be held n.p.o. after midnight.  CT   of the hip and femur is pending.  We will provide calcium and vitamin D.  The   prophylaxis against DVT is Lovenox.  Provide appropriate analgesia.  Brace is   in place.  2.  Diabetes mellitus.  We will provide Levemir and sliding scale coverage.    Hold metformin.  3.  Hypertension.  We will continue the home regimen.  Provide PRNs.  4.  Chronic obstructive pulmonary disease.  We will provide Spiriva,   aerosolized bronchodilators and supplemental oxygen as needed.  5.  Prophylaxis.  Lovenox, laxatives.       ____________________________________     MD CAYLA ASTUDILLO / HERNANDO    DD:  01/27/2017 17:04:38  DT:  01/27/2017 19:17:34    D#:  [id number]  Job#:  [id number]    cc: Neftaly Meehan MD, CHRISTINA ALLEN MD

## 2017-01-28 NOTE — PROGRESS NOTES
"Seen and examined, asked to assume care by Dr. Carlos.  Left distal femur fracture from ground level fall.  No antecedent pain.    Blood pressure 121/50, pulse 60, temperature 35.9 °C (96.7 °F), resp. rate 16, height 1.6 m (5' 3\"), weight 90.719 kg (200 lb), SpO2 87 %, not currently breastfeeding.    Exam:  LLE fires TA/GS/EHL/P, sensation intact S/S/SP/DP/T, 2+ DP on left, skin OK    #1 Left distal femur fracture    Plan:  - To OR for ORIF  - NPO  - TTWB post op  "

## 2017-01-28 NOTE — PROGRESS NOTES
Hospital Medicine Progress Note, Adult, Complex               Author: Roxana Walsh Date & Time created: 1/28/2017  7:54 AM     Interval History:  62 YO female w/ Obesity, DM2 (on Amaryl and Levimir), HTN (on Norvasc, metoprolol, lisnopril), COPD (on Spiriva and Albuterol), admitted 7/2016 w/ Sepsis. She fell on ice 1/27 and sustained a distal L femur Fx, CT shows comminuted, displaced somewhat longitudinal Fx. She is seen s/p PACU post ORIF by Dr Tenorio. Daughter at bedside. Patient Prefers SNF in [place] for rehab, SNF order placed.     Review of Systems:  Review of Systems   Respiratory: Negative for shortness of breath.    Cardiovascular: Negative for chest pain.   Gastrointestinal: Negative for nausea, vomiting, abdominal pain and diarrhea.   Genitourinary: Negative for dysuria.   Musculoskeletal:        Left leg pain, reasonably controlled       Physical Exam:  Physical Exam   Constitutional: She is oriented to person, place, and time. She appears well-developed and well-nourished. No distress.   HENT:   Head: Normocephalic and atraumatic.   Eyes: EOM are normal. Pupils are equal, round, and reactive to light. Right eye exhibits no discharge. Left eye exhibits no discharge.   Neck: Neck supple.   Cardiovascular: Normal rate and regular rhythm.    Pulmonary/Chest: Effort normal and breath sounds normal.   Abdominal: Soft. Bowel sounds are normal. She exhibits no distension. There is no tenderness.   Neurological: She is alert and oriented to person, place, and time.   Skin: Skin is warm and dry. She is not diaphoretic.   Psychiatric: She has a normal mood and affect.   Nursing note and vitals reviewed.      Labs:        Invalid input(s): [id number]      Recent Labs      01/27/17   1600  01/28/17   0204   SODIUM  138  137   POTASSIUM  3.8  4.6   CHLORIDE  105  105   CO2  27 28   BUN  11  12   CREATININE  0.59  0.73   CALCIUM  9.0  9.1     Recent Labs      01/27/17   1600  01/28/17   0204   GLUCOSE   187*  173*     Recent Labs      17   1600  17   0204   RBC  4.94  4.25   HEMOGLOBIN  14.3  12.4   HEMATOCRIT  43.8  38.5   PLATELETCT  215  186   PROTHROMBTM  12.9   --    APTT  28.3   --    INR  0.94   --      Recent Labs      17   1600  17   0204   WBC  10.7  14.3*   NEUTSPOLYS  66.90   --    LYMPHOCYTES  25.00   --    MONOCYTES  6.30   --    EOSINOPHILS  0.90   --    BASOPHILS  0.40   --            Hemodynamics:  Temp (24hrs), Av.3 °C (97.4 °F), Min:35.9 °C (96.7 °F), Max:36.9 °C (98.4 °F)  Temperature: 36.9 °C (98.4 °F)  Pulse  Av.3  Min: 51  Max: 86Heart Rate (Monitored): (!) 57  Blood Pressure: 130/71 mmHg, NIBP: 142/71 mmHg     Respiratory:    Respiration: 17, Pulse Oximetry: 99 %           Fluids:    Intake/Output Summary (Last 24 hours) at 17 0754  Last data filed at 17 0742   Gross per 24 hour   Intake    572 ml   Output    300 ml   Net    272 ml     Weight: 90.719 kg (200 lb)  GI/Nutrition:  Orders Placed This Encounter   Procedures   • DIET NPO     Standing Status: Standing      Number of Occurrences: 1      Standing Expiration Date:      Order Specific Question:  Restrict to:     Answer:  Sips with Medications [3]     Medical Decision Making, by Problem:  Active Hospital Problems    Diagnosis   • Leukocytosis [D72.829] probably reactive CBC in AM   • BMI 35.0-35.9,adult [Z68.35]   • DM (diabetes mellitus), type 2 (CMS-HCC) [E11.9] diet SSI, lantus- near goal   • COPD (chronic obstructive pulmonary disease) (CMS-HCC) [J44.9]   • HTN (hypertension) [I10]   • Fracture of distal femur LEFT (CMS-HCC) [S72.409A] ORIF plate and screws  (Walker) TTWB       Labs reviewed and Medications reviewed  Burroughs catheter: One or Two Days Post Surgery (Day of Surgery being Day 0)      DVT Prophylaxis: Enoxaparin (Lovenox)    Ulcer prophylaxis: Not indicated    Assessed for rehab: Patient was assess for and/or received rehabilitation services during this  hospitalization

## 2017-01-28 NOTE — FACE TO FACE
Face to Face Note  -  Durable Medical Equipment    Will Lagunas M.D. - NPI: [id number]  I certify that this patient is under my care and that they had a durable medical equipment(DME)face to face encounter by myself that meets the physician DME face-to-face encounter requirements with this patient on:    Date of encounter:   Patient:                    MRN:                       YOB: 2019  Miley Whittington  [id number]  1955     The encounter with the patient was in whole, or in part, for the following medical condition, which is the primary reason for durable medical equipment:  Post-Op Surgery    I certify that, based on my findings, the following durable medical equipment is medically necessary:  Walkers and Wheel Chair.    HOME O2 Saturation Measurements:(Values must be present for Home Oxygen orders)         ,     ,         My Clinical findings support the need for the above equipment due to:  Abnormal Gait, Bedbound/non-weight bearing, Other - post op left distal femur replacement    Supporting Symptoms: nonweightbearing to left leg, status post ORIF left distal femur fracture

## 2017-01-28 NOTE — PROGRESS NOTES
Pt arrived to floor from ER via gurney at 1800.  Pt AA/O X4. LS clear. On 2L NC. VSS. HRR. BS+ bm x1 early this am per pt. Burroughs cath to d/d with clear yellow urine. CMS+ MARIN. NWB to LLE. Left leg immobilizer in place. IV to lac saline lock. Family at bedside. Discusses hourly rounding and POC, pt agreeable.  Bed alarm in place for safety. Pt oriented to room, bed controls, skylight and POC. Call light at bedside and within reach. This Rn spoke with Dr. Alexy Moffett, no plan for sx tonight, diabetic diet ordered.    2 RN skin assessment performed. Skin intact. No lesions or wounds noted.

## 2017-01-28 NOTE — OP REPORT
DATE OF SERVICE:  01/28/2017    PREOPERATIVE DIAGNOSIS:  Left intercondylar, supracondylar distal femur   fracture.    POSTOPERATIVE DIAGNOSIS:  Left intercondylar, supracondylar distal femur   fracture.    PROCEDURE:  Open reduction internal fixation of left supracondylar,   intercondylar distal femur fracture.    SURGEON:  Tl Tenorio MD    ASSISTANT:  Will Lagunas MD    ANESTHESIOLOGIST:  Rajesh Keen MD    ANESTHESIA:  General.    SPECIMENS:  None.    ESTIMATED BLOOD LOSS:  100 mL.    COMPLICATIONS:  None.    OPERATIVE INDICATIONS:  The patient is a very pleasant 61-year-old female who   suffered a ground level fall and subsequent left intraarticular distal femur   fracture.  Radiographs demonstrated a comminuted metaphyseal, supracondylar   femur fracture with nondisplaced intracondylar extension.  She has a normal   neurovascular exam and a normal skin envelope.  Given these findings, she is   an appropriately indicated candidate for open reduction internal fixation of   her femur fracture.  I discussed the risks, benefits, and alternatives with   the patient including the risk of infection, wound healing complications,   neurovascular injury, blood loss, DVT, PE, malunion, nonunion, plate   stiffness, symptomatic hardware as well as the medical risk of anesthesia   including MI, stroke, and death.  Discussed alternatives of procedure   including nonoperative management.  Discussed benefits of the proposed   procedure including early mobilization, early range of motion of the knee and   improved chance of healing, acceptable alignment.  She is in agreement with   the plan to proceed and her informed consent was signed and documented in the   medical record.  I met with her preoperatively and marked the operative   extremity with her agreement.  We proceeded to the operating room at [facility].    OPERATIVE COURSE:  She underwent general anesthesia with Dr. Keen, was   positioned supine with a bump  under the left buttock and wrap to the left leg   on the flat OSI table.  The left lower extremity was then prepped and draped   in sterile orthopedic fashion using ChloraPrep and the surgical team scrubbed   in.  A procedural pause was conducted to verify correct patient, correct   extremity, presence of the surgeon's initials on the operative extremity, and   administration of IV antibiotics in this case Ancef.  Following generalized   agreement, a lateral approach to the distal femur was performed incising the   skin and subcutaneous tissue sharply.  We dissected down to the fascia and   split this in line with our incision curving it anteriorly to distal end of   the incision.  We identified the fracture site and took care not to dissect in   the area of comminution.  The intercondylar split was palpated and the   lateral and anterior arthrotomy was made.  We then placed a periarticular   reduction forceps through a small medial incision across the intercondylar   split through across the condyle to prevent displace in the articular split.    We then drilled for and placed one 3.5 mm bicortical nonlocking screw in   position mode, securing the intercondylar split.  We then selected a 19-hole   Smith and Nephew locking lateral distal femur plate and split this up the   femur proximally.  We then applied traction and rotation to the lower   extremity to affect the reduction.  The Smith and Nephew plate was then   secured distally with one 4.5 mm bicortical nonlocking screw, which compressed   screw through the bone.  We then retrieved the plate proximally with a small   counterincision by the hip.  Prior to insertion of the plate, the proximal   aspect of the plate was contoured with tabletop beltran, which was conformed to   the greater trochanter.  This portion of the plate was then secured to the   proximal portion of the femur using a 4.5 mm bicortical nonlocking screw.  We   confirmed our reduction on AP and  lateral fluoroscopy.  We were quite   satisfied with our reduction and plate length.  We then secured the fracture   with additional 4.5 mm locking screws distally drilling all holes in the   distal aspect of the plate.  Proximally, we placed additional nonlocking   screws to spread our construct.  A 4.5 mm unicortical screw was also placed   within the femoral neck and head to protect the proximal aspect of the femur.    Final fluoroscopic images were then obtained, demonstrating a near anatomic   reduction of the fracture and good position of all hardware.  We then   thoroughly irrigated the wound with copious amounts of normal saline and   closed in layers with #1 Vicryl in the fascia, 2-0 Vicryl in subcutaneous   tissue and staples in the skin.  Sterile dressings were applied.  Patient was   transferred to the recovery room in stable condition.  There were no   complications.    Using Will Lagunas MD as a surgical assistant was necessary for assistance   with exposure, retraction, fracture reduction, instrumentation, and closure.    POSTOPERATIVE PLAN:  1.  Toe touch weightbearing left lower extremity, begin range of motion on   postop day #2 if wound appear to be in good condition, knee immobilizer at all   times until that time.  2.  DVT prophylaxis with SCDs and Lovenox in the hospital, transition to   aspirin as an outpatient.  3.  IV antibiotic prophylaxis - no additional required.  4.  Discharge planning.       ____________________________________     MD PAULIE Villa / HERNANDO    DD:  01/28/2017 11:40:05  DT:  01/28/2017 13:18:18    D#:  [id number]  Job#:  [id number]

## 2017-01-28 NOTE — ED NOTES
Called in report to [facility] on [id number]. Pt currently at CT. Will be transported to floor once CT complete.

## 2017-01-29 LAB
AMORPH CRY #/AREA URNS HPF: PRESENT /HPF
APPEARANCE UR: ABNORMAL
BASOPHILS # BLD AUTO: 0.3 % (ref 0–1.8)
BASOPHILS # BLD: 0.04 K/UL (ref 0–0.12)
BILIRUB UR QL STRIP.AUTO: NEGATIVE
COLOR UR: YELLOW
EOSINOPHIL # BLD AUTO: 0.06 K/UL (ref 0–0.51)
EOSINOPHIL NFR BLD: 0.4 % (ref 0–6.9)
EPI CELLS #/AREA URNS HPF: NORMAL /HPF
ERYTHROCYTE [DISTWIDTH] IN BLOOD BY AUTOMATED COUNT: 43.6 FL (ref 35.9–50)
GLUCOSE BLD-MCNC: 138 MG/DL (ref 65–99)
GLUCOSE BLD-MCNC: 156 MG/DL (ref 65–99)
GLUCOSE BLD-MCNC: 166 MG/DL (ref 65–99)
GLUCOSE BLD-MCNC: 171 MG/DL (ref 65–99)
GLUCOSE UR STRIP.AUTO-MCNC: 100 MG/DL
HCT VFR BLD AUTO: 33.8 % (ref 37–47)
HGB BLD-MCNC: 11 G/DL (ref 12–16)
IMM GRANULOCYTES # BLD AUTO: 0.05 K/UL (ref 0–0.11)
IMM GRANULOCYTES NFR BLD AUTO: 0.3 % (ref 0–0.9)
KETONES UR STRIP.AUTO-MCNC: 40 MG/DL
LEUKOCYTE ESTERASE UR QL STRIP.AUTO: NEGATIVE
LYMPHOCYTES # BLD AUTO: 2.06 K/UL (ref 1–4.8)
LYMPHOCYTES NFR BLD: 13.8 % (ref 22–41)
MCH RBC QN AUTO: 29.6 PG (ref 27–33)
MCHC RBC AUTO-ENTMCNC: 32.5 G/DL (ref 33.6–35)
MCV RBC AUTO: 90.9 FL (ref 81.4–97.8)
MICRO URNS: ABNORMAL
MONOCYTES # BLD AUTO: 1.37 K/UL (ref 0–0.85)
MONOCYTES NFR BLD AUTO: 9.2 % (ref 0–13.4)
MUCOUS THREADS #/AREA URNS HPF: NORMAL /HPF
NEUTROPHILS # BLD AUTO: 11.31 K/UL (ref 2–7.15)
NEUTROPHILS NFR BLD: 76 % (ref 44–72)
NITRITE UR QL STRIP.AUTO: NEGATIVE
NRBC # BLD AUTO: 0 K/UL
NRBC BLD AUTO-RTO: 0 /100 WBC
PH UR STRIP.AUTO: 7 [PH]
PLATELET # BLD AUTO: 149 K/UL (ref 164–446)
PMV BLD AUTO: 10.4 FL (ref 9–12.9)
PROT UR QL STRIP: NEGATIVE MG/DL
RBC # BLD AUTO: 3.72 M/UL (ref 4.2–5.4)
RBC # URNS HPF: NORMAL /HPF
RBC UR QL AUTO: NEGATIVE
SP GR UR STRIP.AUTO: 1.01
WBC # BLD AUTO: 14.9 K/UL (ref 4.8–10.8)

## 2017-01-29 PROCEDURE — 700111 HCHG RX REV CODE 636 W/ 250 OVERRIDE (IP): Performed by: INTERNAL MEDICINE

## 2017-01-29 PROCEDURE — 700111 HCHG RX REV CODE 636 W/ 250 OVERRIDE (IP): Performed by: ORTHOPAEDIC SURGERY

## 2017-01-29 PROCEDURE — 82962 GLUCOSE BLOOD TEST: CPT | Mod: 91

## 2017-01-29 PROCEDURE — 97166 OT EVAL MOD COMPLEX 45 MIN: CPT

## 2017-01-29 PROCEDURE — A9270 NON-COVERED ITEM OR SERVICE: HCPCS | Performed by: INTERNAL MEDICINE

## 2017-01-29 PROCEDURE — 97162 PT EVAL MOD COMPLEX 30 MIN: CPT

## 2017-01-29 PROCEDURE — 81001 URINALYSIS AUTO W/SCOPE: CPT

## 2017-01-29 PROCEDURE — G8987 SELF CARE CURRENT STATUS: HCPCS | Mod: CL

## 2017-01-29 PROCEDURE — G8988 SELF CARE GOAL STATUS: HCPCS | Mod: CI

## 2017-01-29 PROCEDURE — 700102 HCHG RX REV CODE 250 W/ 637 OVERRIDE(OP): Performed by: INTERNAL MEDICINE

## 2017-01-29 PROCEDURE — 36415 COLL VENOUS BLD VENIPUNCTURE: CPT

## 2017-01-29 PROCEDURE — 770001 HCHG ROOM/CARE - MED/SURG/GYN PRIV*

## 2017-01-29 PROCEDURE — 700112 HCHG RX REV CODE 229: Performed by: INTERNAL MEDICINE

## 2017-01-29 PROCEDURE — 85025 COMPLETE CBC W/AUTO DIFF WBC: CPT

## 2017-01-29 PROCEDURE — G8978 MOBILITY CURRENT STATUS: HCPCS | Mod: CL

## 2017-01-29 PROCEDURE — G8979 MOBILITY GOAL STATUS: HCPCS | Mod: CJ

## 2017-01-29 PROCEDURE — 99232 SBSQ HOSP IP/OBS MODERATE 35: CPT | Performed by: INTERNAL MEDICINE

## 2017-01-29 RX ORDER — CYCLOBENZAPRINE HCL 10 MG
10 TABLET ORAL 3 TIMES DAILY PRN
Status: DISCONTINUED | OUTPATIENT
Start: 2017-01-29 | End: 2017-01-31

## 2017-01-29 RX ORDER — CYCLOBENZAPRINE HCL 10 MG
5 TABLET ORAL TWICE DAILY
Status: DISCONTINUED | OUTPATIENT
Start: 2017-01-29 | End: 2017-01-29

## 2017-01-29 RX ADMIN — OXYCODONE HYDROCHLORIDE 5 MG: 5 TABLET ORAL at 22:47

## 2017-01-29 RX ADMIN — GABAPENTIN 100 MG: 100 CAPSULE ORAL at 21:11

## 2017-01-29 RX ADMIN — ENOXAPARIN SODIUM 40 MG: 100 INJECTION SUBCUTANEOUS at 21:12

## 2017-01-29 RX ADMIN — OXYCODONE HYDROCHLORIDE 5 MG: 5 TABLET ORAL at 08:43

## 2017-01-29 RX ADMIN — INSULIN LISPRO 2 UNITS: 100 INJECTION, SOLUTION INTRAVENOUS; SUBCUTANEOUS at 21:22

## 2017-01-29 RX ADMIN — INSULIN LISPRO 2 UNITS: 100 INJECTION, SOLUTION INTRAVENOUS; SUBCUTANEOUS at 17:37

## 2017-01-29 RX ADMIN — INSULIN GLARGINE 10 UNITS: 100 INJECTION, SOLUTION SUBCUTANEOUS at 21:22

## 2017-01-29 RX ADMIN — METOPROLOL TARTRATE 25 MG: 25 TABLET, FILM COATED ORAL at 08:43

## 2017-01-29 RX ADMIN — INSULIN LISPRO 2 UNITS: 100 INJECTION, SOLUTION INTRAVENOUS; SUBCUTANEOUS at 12:19

## 2017-01-29 RX ADMIN — METOPROLOL TARTRATE 25 MG: 25 TABLET, FILM COATED ORAL at 21:12

## 2017-01-29 RX ADMIN — AMLODIPINE BESYLATE 10 MG: 10 TABLET ORAL at 08:42

## 2017-01-29 RX ADMIN — VITAMIN D, TAB 1000IU (100/BT) 1000 UNITS: 25 TAB at 08:42

## 2017-01-29 RX ADMIN — OXYCODONE HYDROCHLORIDE 5 MG: 5 TABLET ORAL at 14:37

## 2017-01-29 RX ADMIN — CEFAZOLIN SODIUM 2000 MG: 2 INJECTION, SOLUTION INTRAVENOUS at 02:25

## 2017-01-29 RX ADMIN — LISINOPRIL 40 MG: 20 TABLET ORAL at 08:42

## 2017-01-29 RX ADMIN — DOCUSATE SODIUM 100 MG: 100 CAPSULE ORAL at 08:42

## 2017-01-29 RX ADMIN — OXYCODONE HYDROCHLORIDE 5 MG: 5 TABLET ORAL at 03:56

## 2017-01-29 RX ADMIN — Medication 1 TABLET: at 21:11

## 2017-01-29 RX ADMIN — ANTACID TABLETS 1000 MG: 500 TABLET, CHEWABLE ORAL at 08:42

## 2017-01-29 ASSESSMENT — ACTIVITIES OF DAILY LIVING (ADL): TOILETING: INDEPENDENT

## 2017-01-29 ASSESSMENT — ENCOUNTER SYMPTOMS
DIARRHEA: 0
VOMITING: 0
SHORTNESS OF BREATH: 0
ABDOMINAL PAIN: 0
NAUSEA: 0

## 2017-01-29 ASSESSMENT — PAIN SCALES - GENERAL
PAINLEVEL_OUTOF10: 0
PAINLEVEL_OUTOF10: 7
PAINLEVEL_OUTOF10: 6

## 2017-01-29 ASSESSMENT — GAIT ASSESSMENTS: GAIT LEVEL OF ASSIST: UNABLE TO PARTICIPATE

## 2017-01-29 NOTE — THERAPY
"Occupational Therapy Evaluation completed.   Functional Status:  Pt seen for OT eval due to recent L distal femur fx. Previously independent in all ADLs/IADLs and lives alone. Currently requires Max A for ADLs.   Plan of Care: Will benefit from Occupational Therapy 3 times per week  Discharge Recommendations:  Equipment: Will Continue to Assess for Equipment Needs. Post-acute therapy recommended before discharged home.    See \"Rehab Therapy-Acute\" Patient Summary Report for complete documentation.    "

## 2017-01-29 NOTE — RESPIRATORY CARE
COPD EDUCATION by COPD CLINICAL EDUCATOR  1/29/2017 at 8:58 AM by Angie Metz     Patient reviewed by COPD education team. Patient does not qualify for COPD program.

## 2017-01-29 NOTE — PROGRESS NOTES
Resting comfortably, tolerating PO without N/V. Incentive Spirometer demonstrates 1500ml. Minimal pain  With resting in bed, will monitor

## 2017-01-29 NOTE — PROGRESS NOTES
"   Orthopaedic Progress Note    Interval changes:  Patient doing well post op  Muscle spasm issues -flexeril 5mg PO BID added    ROS - Patient denies any new issues.  Pain well controlled.    Blood pressure 125/72, pulse 78, temperature 37.1 °C (98.7 °F), resp. rate 16, height 1.6 m (5' 3\"), weight 90.719 kg (200 lb), SpO2 92 %, not currently breastfeeding.      Patient seen and examined  No acute distress  Breathing non labored  RRR  LLE surgical dressing is clean, dry, and intact. Patient clearly fires tibialis anterior, EHL, and gastrocnemius/soleus. Sensation is intact to light touch throughout superficial peroneal, deep peroneal, tibial, saphenous, and sural nerve distributions. Strong and palpable 2+ dorsalis pedis and posterior tibial pulses with capillary refill less than 2 seconds. No lower leg tenderness or discomfort.      Recent Labs      01/27/17   1600  01/28/17   0204  01/29/17   0157   WBC  10.7  14.3*  14.9*   RBC  4.94  4.25  3.72*   HEMOGLOBIN  14.3  12.4  11.0*   HEMATOCRIT  43.8  38.5  33.8*   MCV  88.7  90.6  90.9   MCH  28.9  29.2  29.6   MCHC  32.6*  32.2*  32.5*   RDW  42.1  43.8  43.6   PLATELETCT  215  186  149*   MPV  10.7  10.5  10.4       Active Hospital Problems    Diagnosis   • Leukocytosis [D72.829]   • BMI 35.0-35.9,adult [Z68.35]   • DM (diabetes mellitus), type 2 (CMS-HCC) [E11.9]   • COPD (chronic obstructive pulmonary disease) (CMS-HCC) [J44.9]   • HTN (hypertension) [I10]   • Fracture of distal femur (CMS-HCC) [S72.409A]       Assessment/Plan:  Patient doing well post op  POD#1 S/P Open reduction internal fixation of left supracondylar,    intercondylar distal femur fracture.  Wt bearing status - TTWB LLE  Wound care/Drains - dressing change tomorrow  Future Procedures - none planned   Lovenox: Start 1/27, Duration-until ambulatory > 150'  Sutures/Staples out- 10-14 days post operatively  PT/OT-initiated  Antibiotics: completed   DVT Prophylaxis- TEDS/SCDs/Foot " pumps  Burroughs-none   Case Coordination for Discharge Planning - Disposition home vs SNF

## 2017-01-29 NOTE — PROGRESS NOTES
Applied knee immobilizer to pt's LLE. +cms pre/post brace application. Pt has been instructed to call Rn for any questions or discomfort.

## 2017-01-29 NOTE — THERAPY
"Physical Therapy Evaluation completed.   Bed Mobility:  Supine to Sit: Moderate Assist  Transfers: Sit to Stand: Maximal Assist  Gait: Level Of Assist: Unable to Participate   Plan of Care: Will benefit from Physical Therapy 3 times per week  Discharge Recommendations: Equipment: Front-Wheel Walker. Post-acute therapy recommended before discharged home.    See \"Rehab Therapy-Acute\" Patient Summary Report for complete documentation.     "

## 2017-01-29 NOTE — PROGRESS NOTES
Hospital Medicine Progress Note, Adult, Complex               Author: Roxana Walsh Date & Time created: 1/29/2017  2:04 PM     Interval History:  62 YO female w/ Obesity, DM2 (on Amaryl and Levimir), HTN (on Norvasc, metoprolol, lisnopril), COPD (on Spiriva and Albuterol), admitted 7/2016 w/ Sepsis. She fell on ice 1/27 and sustained a distal L femur Fx, CT shows comminuted, displaced somewhat longitudinal Fx. She is seen s/p PACU post ORIF by Dr Tenorio.  Having muscle cramps both legs, requests muscle relaxer.     Review of Systems:  Review of Systems   Respiratory: Negative for shortness of breath.    Cardiovascular: Negative for chest pain.   Gastrointestinal: Negative for nausea, vomiting, abdominal pain and diarrhea.   Genitourinary: Negative for dysuria.   Musculoskeletal:        Left leg pain, now bilateral leg cramps       Physical Exam:  Physical Exam   Constitutional: She is oriented to person, place, and time. She appears well-developed and well-nourished. No distress.   HENT:   Head: Normocephalic and atraumatic.   Eyes: EOM are normal. Pupils are equal, round, and reactive to light. Right eye exhibits no discharge. Left eye exhibits no discharge.   Neck: Neck supple.   Cardiovascular: Normal rate and regular rhythm.    Pulmonary/Chest: Effort normal and breath sounds normal.   Abdominal: Soft. Bowel sounds are normal. She exhibits no distension. There is no tenderness.   Neurological: She is alert and oriented to person, place, and time.   Skin: Skin is warm and dry. She is not diaphoretic.   Psychiatric: She has a normal mood and affect.   Nursing note and vitals reviewed.      Labs:        Invalid input(s): [id number]      Recent Labs      01/27/17   1600  01/28/17   0204   SODIUM  138  137   POTASSIUM  3.8  4.6   CHLORIDE  105  105   CO2  27 28   BUN  11  12   CREATININE  0.59  0.73   CALCIUM  9.0  9.1     Recent Labs      01/27/17   1600  01/28/17   0204   GLUCOSE  187*  173*     Recent  Labs      17   1600  17   0204  17   0157   RBC  4.94  4.25  3.72*   HEMOGLOBIN  14.3  12.4  11.0*   HEMATOCRIT  43.8  38.5  33.8*   PLATELETCT  215  186  149*   PROTHROMBTM  12.9   --    --    APTT  28.3   --    --    INR  0.94   --    --      Recent Labs      17   1600  17   0204  17   015   WBC  10.7  14.3*  14.9*   NEUTSPOLYS  66.90   --   76.00*   LYMPHOCYTES  25.00   --   13.80*   MONOCYTES  6.30   --   9.20   EOSINOPHILS  0.90   --   0.40   BASOPHILS  0.40   --   0.30           Hemodynamics:  Temp (24hrs), Av.3 °C (97.4 °F), Min:35.9 °C (96.6 °F), Max:37.1 °C (98.7 °F)  Temperature: 37.1 °C (98.7 °F)  Pulse  Av  Min: 51  Max: 91   Blood Pressure: 125/72 mmHg     Respiratory:    Respiration: 16, Pulse Oximetry: 92 %           Fluids:    Intake/Output Summary (Last 24 hours) at 17 1404  Last data filed at 17 0500   Gross per 24 hour   Intake      0 ml   Output   1300 ml   Net  -1300 ml        GI/Nutrition:  Orders Placed This Encounter   Procedures   • DIET ORDER     Standing Status: Standing      Number of Occurrences: 1      Standing Expiration Date:      Order Specific Question:  Diet:     Answer:  Diabetic [3]     Medical Decision Making, by Problem:  Active Hospital Problems    Diagnosis   • Leukocytosis [D72.829] no change- check UA if UTI was POA as Leukocytosis was  POA   • BMI 35.0-35.9,adult [Z68.35]   • DM (diabetes mellitus), type 2 (CMS-HCC) [E11.9] diet SSI, lantus- @ goal   • COPD (chronic obstructive pulmonary disease) (CMS-HCC) [J44.9]   • HTN (hypertension) [I10] @ goal   • Fracture of distal femur LEFT (CMS-HCC) [S72.409A] ORIF plate and screws  (Walker) TTWB, flexeril for crmaps, probably dispo to SNF   DC peres    Labs reviewed and Medications reviewed  Peres Catheter: ordered dc.      DVT Prophylaxis: Enoxaparin (Lovenox)    Ulcer prophylaxis: Not indicated    Assessed for rehab: Patient was assess for and/or received  rehabilitation services during this hospitalization

## 2017-01-30 LAB
BREATH ALCOHOL COMMENT: NORMAL
GLUCOSE BLD-MCNC: 130 MG/DL (ref 65–99)
GLUCOSE BLD-MCNC: 177 MG/DL (ref 65–99)
GLUCOSE BLD-MCNC: 178 MG/DL (ref 65–99)
GLUCOSE BLD-MCNC: 217 MG/DL (ref 65–99)
POC BREATHALIZER: 0 PERCENT (ref 0–0.01)

## 2017-01-30 PROCEDURE — 700111 HCHG RX REV CODE 636 W/ 250 OVERRIDE (IP): Performed by: INTERNAL MEDICINE

## 2017-01-30 PROCEDURE — 700102 HCHG RX REV CODE 250 W/ 637 OVERRIDE(OP): Performed by: INTERNAL MEDICINE

## 2017-01-30 PROCEDURE — A9270 NON-COVERED ITEM OR SERVICE: HCPCS | Performed by: INTERNAL MEDICINE

## 2017-01-30 PROCEDURE — 82962 GLUCOSE BLOOD TEST: CPT | Mod: 91

## 2017-01-30 PROCEDURE — 99232 SBSQ HOSP IP/OBS MODERATE 35: CPT | Performed by: INTERNAL MEDICINE

## 2017-01-30 PROCEDURE — 770006 HCHG ROOM/CARE - MED/SURG/GYN SEMI*

## 2017-01-30 PROCEDURE — 700112 HCHG RX REV CODE 229: Performed by: INTERNAL MEDICINE

## 2017-01-30 PROCEDURE — 51798 US URINE CAPACITY MEASURE: CPT

## 2017-01-30 RX ADMIN — INSULIN GLARGINE 10 UNITS: 100 INJECTION, SOLUTION SUBCUTANEOUS at 22:29

## 2017-01-30 RX ADMIN — DOCUSATE SODIUM 100 MG: 100 CAPSULE ORAL at 08:55

## 2017-01-30 RX ADMIN — ENOXAPARIN SODIUM 40 MG: 100 INJECTION SUBCUTANEOUS at 22:27

## 2017-01-30 RX ADMIN — INSULIN LISPRO 2 UNITS: 100 INJECTION, SOLUTION INTRAVENOUS; SUBCUTANEOUS at 17:09

## 2017-01-30 RX ADMIN — Medication 1 TABLET: at 22:28

## 2017-01-30 RX ADMIN — OXYCODONE HYDROCHLORIDE 5 MG: 5 TABLET ORAL at 11:16

## 2017-01-30 RX ADMIN — AMLODIPINE BESYLATE 10 MG: 10 TABLET ORAL at 08:55

## 2017-01-30 RX ADMIN — INSULIN LISPRO 3 UNITS: 100 INJECTION, SOLUTION INTRAVENOUS; SUBCUTANEOUS at 22:29

## 2017-01-30 RX ADMIN — OXYCODONE HYDROCHLORIDE 5 MG: 5 TABLET ORAL at 22:28

## 2017-01-30 RX ADMIN — OXYCODONE HYDROCHLORIDE 5 MG: 5 TABLET ORAL at 05:14

## 2017-01-30 RX ADMIN — VITAMIN D, TAB 1000IU (100/BT) 1000 UNITS: 25 TAB at 08:54

## 2017-01-30 RX ADMIN — INSULIN LISPRO 2 UNITS: 100 INJECTION, SOLUTION INTRAVENOUS; SUBCUTANEOUS at 11:12

## 2017-01-30 RX ADMIN — GABAPENTIN 100 MG: 100 CAPSULE ORAL at 22:28

## 2017-01-30 RX ADMIN — ANTACID TABLETS 1000 MG: 500 TABLET, CHEWABLE ORAL at 08:55

## 2017-01-30 RX ADMIN — METOPROLOL TARTRATE 25 MG: 25 TABLET, FILM COATED ORAL at 22:27

## 2017-01-30 RX ADMIN — LISINOPRIL 40 MG: 20 TABLET ORAL at 08:54

## 2017-01-30 RX ADMIN — METOPROLOL TARTRATE 25 MG: 25 TABLET, FILM COATED ORAL at 08:55

## 2017-01-30 ASSESSMENT — PAIN SCALES - GENERAL
PAINLEVEL_OUTOF10: 7
PAINLEVEL_OUTOF10: 4
PAINLEVEL_OUTOF10: 0
PAINLEVEL_OUTOF10: 4
PAINLEVEL_OUTOF10: 4
PAINLEVEL_OUTOF10: 5
PAINLEVEL_OUTOF10: 5

## 2017-01-30 ASSESSMENT — ENCOUNTER SYMPTOMS
NAUSEA: 0
SHORTNESS OF BREATH: 0
DIARRHEA: 0
VOMITING: 0
ABDOMINAL PAIN: 0

## 2017-01-30 NOTE — DISCHARGE PLANNING
TCN met with patient at bedside to discuss the care teams recommendation for SNF. Patient is agreeable suggestion she lives in [place] and believes workmans comp would cover her SNF needs patient selected [facility] as first choice and then opted for blanket referral to all local [place] and [place] SNFs. Choice signed and faxed to [facility]. TCN to follow as needed for DC planning.

## 2017-01-30 NOTE — PROGRESS NOTES
Burroughs removed, will monitor for voiding. Patient calls for assist, up to chair, PO pain medications. Right le with dressing cdi. Patient is slow to move. 2 person assist.

## 2017-01-30 NOTE — PROGRESS NOTES
Radha LOWERY was called out during business hours to ER for a post accident UDS and BAT on 1/27/17 for [place].    UDS collected at Unable to collect.  BAT collected at 4:58pm.

## 2017-01-30 NOTE — PROGRESS NOTES
Received report on pt, pt resting comfortably in bed, no complaints of pain, 2L nasal cannula, awaiting first void post peres removal, immobilizer on LLE.

## 2017-01-30 NOTE — CARE PLAN
Problem: Safety  Goal: Will remain free from falls  Outcome: PROGRESSING AS EXPECTED  Pt remained free from falls during shift. Pt oriented to call light, bed in low position and wheels locked. Pt encouraged to call for assistance.

## 2017-01-30 NOTE — PROGRESS NOTES
"   Orthopaedic Progress Note    Interval changes:  Dressing changed   Cleared for DC to SNF pending medicine clearance and placement    ROS - Patient denies any new issues.  Pain well controlled.    Blood pressure 152/76, pulse 88, temperature 36.8 °C (98.3 °F), resp. rate 16, height 1.6 m (5' 3\"), weight 90.719 kg (200 lb), SpO2 98 %, not currently breastfeeding.      Patient seen and examined  No acute distress  Breathing non labored  RRR  LLE surgical dressings changed,  surgical incisions are well approximated and are dry and clean.  There is no erythema, induration, or signs of infection at any of the incision sites.  Patient clearly fires tibialis anterior, EHL, and gastrocnemius/soleus. Sensation is intact to light touch throughout superficial peroneal, deep peroneal, tibial, saphenous, and sural nerve distributions. Strong and palpable 2+ dorsalis pedis and posterior tibial pulses with capillary refill less than 2 seconds. No lower leg tenderness or discomfort.      Recent Labs      01/27/17   1600  01/28/17   0204  01/29/17   0157   WBC  10.7  14.3*  14.9*   RBC  4.94  4.25  3.72*   HEMOGLOBIN  14.3  12.4  11.0*   HEMATOCRIT  43.8  38.5  33.8*   MCV  88.7  90.6  90.9   MCH  28.9  29.2  29.6   MCHC  32.6*  32.2*  32.5*   RDW  42.1  43.8  43.6   PLATELETCT  215  186  149*   MPV  10.7  10.5  10.4       Active Hospital Problems    Diagnosis   • Leukocytosis [D72.829]   • BMI 35.0-35.9,adult [Z68.35]   • DM (diabetes mellitus), type 2 (CMS-HCC) [E11.9]   • COPD (chronic obstructive pulmonary disease) (CMS-HCC) [J44.9]   • HTN (hypertension) [I10]   • Fracture of distal femur (CMS-HCC) [S72.409A]       Assessment/Plan:  Dressing changed incisions without complication  POD#2 S/P Open reduction internal fixation of left supracondylar,    intercondylar distal femur fracture.  Wt bearing status - TTWB LLE  Wound care/Drains - dressing changed nursing to change ever other day  Future Procedures - none planned "   Lovenox: Start 1/27, Duration-until ambulatory > 150'  Sutures/Staples out- 14 days post operatively  PT/OT-initiated  Antibiotics: completed   DVT Prophylaxis- TEDS/SCDs/Foot pumps  Burroughs-none   Case Coordination for Discharge Planning - Disposition SNF

## 2017-01-30 NOTE — DISCHARGE PLANNING
Olga at [facility] called to let us know patient's workers comp case was closed in 2012. MANUELA Mason notified.

## 2017-01-30 NOTE — PROGRESS NOTES
Called [facility] @ [phone]. Left message for them to call back on [phone].

## 2017-01-31 LAB
ERYTHROCYTE [DISTWIDTH] IN BLOOD BY AUTOMATED COUNT: 40.8 FL (ref 35.9–50)
GLUCOSE BLD-MCNC: 138 MG/DL (ref 65–99)
GLUCOSE BLD-MCNC: 158 MG/DL (ref 65–99)
GLUCOSE BLD-MCNC: 188 MG/DL (ref 65–99)
HCT VFR BLD AUTO: 31.6 % (ref 37–47)
HGB BLD-MCNC: 10.2 G/DL (ref 12–16)
MCH RBC QN AUTO: 28.5 PG (ref 27–33)
MCHC RBC AUTO-ENTMCNC: 32.3 G/DL (ref 33.6–35)
MCV RBC AUTO: 88.3 FL (ref 81.4–97.8)
PLATELET # BLD AUTO: 182 K/UL (ref 164–446)
PMV BLD AUTO: 10.7 FL (ref 9–12.9)
RBC # BLD AUTO: 3.58 M/UL (ref 4.2–5.4)
WBC # BLD AUTO: 14.7 K/UL (ref 4.8–10.8)

## 2017-01-31 PROCEDURE — 85027 COMPLETE CBC AUTOMATED: CPT

## 2017-01-31 PROCEDURE — 700102 HCHG RX REV CODE 250 W/ 637 OVERRIDE(OP): Performed by: INTERNAL MEDICINE

## 2017-01-31 PROCEDURE — 82962 GLUCOSE BLOOD TEST: CPT

## 2017-01-31 PROCEDURE — 700105 HCHG RX REV CODE 258: Performed by: HOSPITALIST

## 2017-01-31 PROCEDURE — A9270 NON-COVERED ITEM OR SERVICE: HCPCS | Performed by: INTERNAL MEDICINE

## 2017-01-31 PROCEDURE — 99233 SBSQ HOSP IP/OBS HIGH 50: CPT | Performed by: HOSPITALIST

## 2017-01-31 PROCEDURE — A9270 NON-COVERED ITEM OR SERVICE: HCPCS | Performed by: HOSPITALIST

## 2017-01-31 PROCEDURE — 700112 HCHG RX REV CODE 229: Performed by: INTERNAL MEDICINE

## 2017-01-31 PROCEDURE — 700111 HCHG RX REV CODE 636 W/ 250 OVERRIDE (IP): Performed by: INTERNAL MEDICINE

## 2017-01-31 PROCEDURE — 770006 HCHG ROOM/CARE - MED/SURG/GYN SEMI*

## 2017-01-31 PROCEDURE — 700102 HCHG RX REV CODE 250 W/ 637 OVERRIDE(OP): Performed by: HOSPITALIST

## 2017-01-31 PROCEDURE — 36415 COLL VENOUS BLD VENIPUNCTURE: CPT

## 2017-01-31 RX ORDER — CARVEDILOL 6.25 MG/1
9.38 TABLET ORAL 2 TIMES DAILY WITH MEALS
Status: DISCONTINUED | OUTPATIENT
Start: 2017-01-31 | End: 2017-02-03

## 2017-01-31 RX ORDER — CYCLOBENZAPRINE HCL 10 MG
5 TABLET ORAL 3 TIMES DAILY PRN
Status: DISCONTINUED | OUTPATIENT
Start: 2017-01-31 | End: 2017-02-04

## 2017-01-31 RX ORDER — CYCLOBENZAPRINE HCL 5 MG
5 TABLET ORAL 3 TIMES DAILY PRN
Qty: 30 TAB | Refills: 0 | Status: SHIPPED | DISCHARGE
Start: 2017-01-31 | End: 2017-02-04

## 2017-01-31 RX ORDER — ACETAMINOPHEN 325 MG/1
650 TABLET ORAL EVERY 6 HOURS PRN
Status: DISCONTINUED | OUTPATIENT
Start: 2017-01-31 | End: 2017-02-05

## 2017-01-31 RX ORDER — OXYCODONE HYDROCHLORIDE 5 MG/1
5 TABLET ORAL EVERY 4 HOURS PRN
Status: DISCONTINUED | OUTPATIENT
Start: 2017-01-31 | End: 2017-02-04

## 2017-01-31 RX ORDER — OXYCODONE HYDROCHLORIDE 5 MG/1
5 TABLET ORAL EVERY 4 HOURS PRN
Qty: 30 TAB | Refills: 0 | Status: SHIPPED | DISCHARGE
Start: 2017-01-31 | End: 2017-02-06

## 2017-01-31 RX ORDER — ALENDRONATE SODIUM 10 MG/1
10 TABLET ORAL
Status: DISCONTINUED | OUTPATIENT
Start: 2017-01-31 | End: 2017-02-06 | Stop reason: HOSPADM

## 2017-01-31 RX ORDER — ACETAMINOPHEN 325 MG/1
650 TABLET ORAL EVERY 6 HOURS PRN
Qty: 30 TAB | Refills: 0 | Status: SHIPPED | DISCHARGE
Start: 2017-01-31 | End: 2022-05-31

## 2017-01-31 RX ORDER — CARVEDILOL 3.12 MG/1
9.38 TABLET ORAL 2 TIMES DAILY WITH MEALS
Qty: 60 TAB | Status: SHIPPED | DISCHARGE
Start: 2017-01-31 | End: 2017-02-06

## 2017-01-31 RX ORDER — SODIUM CHLORIDE 9 MG/ML
INJECTION, SOLUTION INTRAVENOUS CONTINUOUS
Status: DISCONTINUED | OUTPATIENT
Start: 2017-01-31 | End: 2017-02-04

## 2017-01-31 RX ORDER — ALENDRONATE SODIUM 10 MG/1
10 TABLET ORAL
Qty: 30 TAB | Status: SHIPPED | DISCHARGE
Start: 2017-01-31 | End: 2022-05-31

## 2017-01-31 RX ORDER — METOPROLOL TARTRATE 50 MG/1
100 TABLET, FILM COATED ORAL 2 TIMES DAILY
Status: ON HOLD | COMMUNITY
End: 2017-01-31

## 2017-01-31 RX ADMIN — INSULIN LISPRO 2 UNITS: 100 INJECTION, SOLUTION INTRAVENOUS; SUBCUTANEOUS at 20:52

## 2017-01-31 RX ADMIN — Medication 1 TABLET: at 21:00

## 2017-01-31 RX ADMIN — INSULIN LISPRO 2 UNITS: 100 INJECTION, SOLUTION INTRAVENOUS; SUBCUTANEOUS at 12:14

## 2017-01-31 RX ADMIN — TIOTROPIUM BROMIDE 1 CAPSULE: 18 CAPSULE ORAL; RESPIRATORY (INHALATION) at 10:42

## 2017-01-31 RX ADMIN — DOCUSATE SODIUM 100 MG: 100 CAPSULE ORAL at 10:19

## 2017-01-31 RX ADMIN — CARVEDILOL 9.38 MG: 6.25 TABLET, FILM COATED ORAL at 10:19

## 2017-01-31 RX ADMIN — ALENDRONATE SODIUM 10 MG: 10 TABLET ORAL at 10:19

## 2017-01-31 RX ADMIN — OXYCODONE HYDROCHLORIDE 5 MG: 5 TABLET ORAL at 16:31

## 2017-01-31 RX ADMIN — ENOXAPARIN SODIUM 40 MG: 100 INJECTION SUBCUTANEOUS at 20:46

## 2017-01-31 RX ADMIN — OXYCODONE HYDROCHLORIDE 5 MG: 5 TABLET ORAL at 03:05

## 2017-01-31 RX ADMIN — INSULIN GLARGINE 10 UNITS: 100 INJECTION, SOLUTION SUBCUTANEOUS at 20:52

## 2017-01-31 RX ADMIN — LACTULOSE 30 ML: 10 SOLUTION ORAL at 06:46

## 2017-01-31 RX ADMIN — OXYCODONE HYDROCHLORIDE 5 MG: 5 TABLET ORAL at 20:46

## 2017-01-31 RX ADMIN — CALCIUM CARBONATE-CHOLECALCIFEROL TAB 250 MG-125 UNIT 1 TABLET: 250-125 TAB at 10:19

## 2017-01-31 RX ADMIN — OXYCODONE HYDROCHLORIDE 5 MG: 5 TABLET ORAL at 06:46

## 2017-01-31 RX ADMIN — GABAPENTIN 100 MG: 100 CAPSULE ORAL at 20:46

## 2017-01-31 RX ADMIN — OXYCODONE HYDROCHLORIDE 5 MG: 5 TABLET ORAL at 12:12

## 2017-01-31 RX ADMIN — SODIUM CHLORIDE: 9 INJECTION, SOLUTION INTRAVENOUS at 10:23

## 2017-01-31 RX ADMIN — VITAMIN D, TAB 1000IU (100/BT) 1000 UNITS: 25 TAB at 10:18

## 2017-01-31 RX ADMIN — CALCIUM CARBONATE-CHOLECALCIFEROL TAB 250 MG-125 UNIT 1 TABLET: 250-125 TAB at 20:46

## 2017-01-31 RX ADMIN — CARVEDILOL 9.38 MG: 6.25 TABLET, FILM COATED ORAL at 16:35

## 2017-01-31 RX ADMIN — ANTACID TABLETS 1000 MG: 500 TABLET, CHEWABLE ORAL at 10:18

## 2017-01-31 RX ADMIN — CYCLOBENZAPRINE HYDROCHLORIDE 5 MG: 10 TABLET, FILM COATED ORAL at 10:19

## 2017-01-31 RX ADMIN — LISINOPRIL 40 MG: 20 TABLET ORAL at 10:18

## 2017-01-31 RX ADMIN — INSULIN LISPRO 2 UNITS: 100 INJECTION, SOLUTION INTRAVENOUS; SUBCUTANEOUS at 16:41

## 2017-01-31 ASSESSMENT — PAIN SCALES - GENERAL
PAINLEVEL_OUTOF10: 5
PAINLEVEL_OUTOF10: 6
PAINLEVEL_OUTOF10: 5
PAINLEVEL_OUTOF10: 5
PAINLEVEL_OUTOF10: 4
PAINLEVEL_OUTOF10: 5
PAINLEVEL_OUTOF10: 5
PAINLEVEL_OUTOF10: 3
PAINLEVEL_OUTOF10: 5

## 2017-01-31 NOTE — DISCHARGE PLANNING
"TCC order chart reviewed.     Medical Decision Making, by Problem:  Active Hospital Problems      Diagnosis    •  Leukocytosis [D72.829] no change- check in AM no source of infection    •  BMI 35.0-35.9,adult [Z68.35]    •  DM (diabetes mellitus), type 2 (CMS-HCC) [E11.9] diet SSI, lantus- @ goal    •  COPD (chronic obstructive pulmonary disease) (CMS-HCC) [J44.9] no exacerbation    •  HTN (hypertension) [I10] @ goal    •  Fracture of distal femur LEFT (CMS-HCC) [S72.409A] ORIF plate and screws 1/28 (Walker) TTWB, flexeril prn for cramps, probably dispo to SNF        Labs reviewed and Medications reviewed  Burroughs catheter: No Burroughs  DVT Prophylaxis: Enoxaparin (Lovenox)  Ulcer prophylaxis: Not indicated  Assessed for rehab: Patient was assess for and/or received rehabilitation services during this hospitalization            Liana Damon PVIRGIE Physical Therapist Signed  Therapy 1/29/2017  9:14 AM      Expand All Collapse All    Physical Therapy Evaluation completed.   Bed Mobility:  Supine to Sit: Moderate Assist  Transfers: Sit to Stand: Maximal Assist  Gait: Level Of Assist: Unable to Participate   Plan of Care: Will benefit from Physical Therapy 3 times per week  Discharge Recommendations: Equipment: Front-Wheel Walker. Post-acute therapy recommended before discharged home.    See \"Rehab Therapy-Acute\" Patient Summary Report for complete documentation.                         Imelda Owen O.T. Occupational Therapist Signed  Therapy 1/29/2017  1:16 PM      Expand All Collapse All    Occupational Therapy Evaluation completed.   Functional Status:  Pt seen for OT eval due to recent L distal femur fx. Previously independent in all ADLs/IADLs and lives alone. Currently requires Max A for ADLs.   Plan of Care: Will benefit from Occupational Therapy 3 times per week  Discharge Recommendations:  Equipment: Will Continue to Assess for Equipment Needs. Post-acute therapy recommended before discharged home.    See " "\"Rehab Therapy-Acute\" Patient Summary Report for complete documentation.                        Edilma Smith  Signed  Discharge Planning 1/30/2017  2:06 PM      Expand All Collapse All    Olga at [facility] called to let us know patient's workers comp case was closed in 2012. MANUELA Isabella notified.            Anticipate discharge to skilled nursing, not meeting criteria for IRF. No physiatry consult requested.     "

## 2017-01-31 NOTE — DISCHARGE PLANNING
"Medical Social Work    SW met with pt at bedside to complete assessment and discuss discharge plan. Pt reports that she has insurance through [employer], but that the currently injury occurred as a result of a fall. Pt reports that Pily from the HR department at work is managing the pt's workman's comp referral. Pily can be reached at [phone].    Care Transition Team Assessment    Information Source  Orientation : Oriented x 4  Who is responsible for making decisions for patient? : Patient  Source of Information: Patient  Primary Caregiver for Others?: No  Why do you believe you were admitted?:  (\"An accidental fall\")    Readmission Evaluation  Is this a readmission?: No    Elopement Risk  Legal Hold: No  Ambulatory or Self Mobile in Wheelchair: No-Not an Elopement Risk  Elopement Risk: Not at Risk for Elopement    Interdisciplinary Discharge Planning  Does Admitting Nurse Feel This Could be a Complex Discharge?: Yes  Primary Care Physician:  (Neftaly Meehan M.D.)  Lives with - Patient's Self Care Capacity: Alone and Able to Care For Self  Patient or legal guardian wants to designate a caregiver (see row info): No  Support Systems: Family Member, 5 children with one daughter that lives locally and a 1 sister to lives locally.   Housing / Facility: [address]  Do You Take your Prescribed Medications Regularly: Yes  Able to Return to Previous ADL's: Future Time w/Therapy  Mobility Issues: No  Prior Services: None  Patient Expects to be Discharged to::  (SNF)  Assistance Needed: Unknown at this Time  Durable Medical Equipment: Other - Specify    Discharge Preparedness  [facility] resources needed?: Financial counselor  What is your plan after discharge?: Skilled nursing facility  Do you have concerns about your hospital stay or care after discharge?: No  Difficulity with ADLs: None  Difficulity with IADLs: None  Pharmacy:  ([facility])  Prescription Coverage: Yes    Functional Assesment  Prior " Functional Level: Ambulatory    Finances  Source of Income: Employed through the [employer]  Insurance: [employer], and workman's comp.    Vision / Hearing Impairment  Vision Impairment : No  Right Eye Vision: Impaired, Wears Glasses  Left Eye Vision: Impaired, Wears Glasses  Hearing Impairment : No    Values / Beliefs / Concerns  Values / Beliefs Concerns : No    Advance Directive  Advance Directive?: None  Advance Directive offered?: AD Booklet refused    Domestic Abuse  Have you ever been the victim of abuse or violence?: No  Physical Abuse or Sexual Abuse: No  Verbal Abuse or Emotional Abuse: No  Possible Abuse Reported to:: Not Applicable    Psychological Assessment  Suspect Abuse: No  Suspect Domestic Violence: No  History of Substance Abuse: None  History of Psychiatric Problems: No  Newly Diagnosed Catastrophic Illness: No  Needs Assistance Dealing with Catastrophic Illness: No  Cancer Diagnosis per Medical Record: No  Facing Drastic Life Change: Yes  No Needs at this Time: Other (Comments)    Discharge Risks or Barriers  Patient risk factors: Uninsured or underinsured    Anticipated Discharge Information  Anticipated discharge disposition: SNF

## 2017-01-31 NOTE — CARE PLAN
Problem: Safety  Goal: Will remain free from falls  Outcome: PROGRESSING AS EXPECTED  Bed in low position, wheels locked, call light within reach, hourly rounding in place.    Problem: Pain Management  Goal: Pain level will decrease to patient’s comfort goal  Outcome: PROGRESSING AS EXPECTED  Patient states pain controlled with Oxycodone 5mg Q4 hours PRN.

## 2017-01-31 NOTE — PROGRESS NOTES
Verified pt's lopressor dose with [facility] pharmacy. med rec complete. Pharmacist was notified.

## 2017-01-31 NOTE — CARE PLAN
Problem: Pain Management  Goal: Pain level will decrease to patient’s comfort goal  PRN pain medication given for 5/10 pain to LLE after active movement from chair to commode back to bed.  Repositioning, elevation and rest.  Call light with in reach of pt, uses appropriately.

## 2017-01-31 NOTE — PROGRESS NOTES
Hospital Medicine Progress Note, Adult, Complex               Author: Roxana A Jillian Date & Time created: 1/30/2017  5:45 PM     Interval History:  62 YO female w/ Obesity, DM2 (on Amaryl and Levimir), HTN (on Norvasc, metoprolol, lisnopril), COPD (on Spiriva and Albuterol), admitted 7/2016 w/ Sepsis. She fell on ice 1/27 and sustained a distal L femur Fx, CT shows comminuted, displaced somewhat longitudinal Fx. She is seen s/p PACU post ORIF by Dr Tenorio.  Leg cramps better, pending SNF, discussed w. Ortho PA, Patient discussed in Multidisciplinary Rounds..     Review of Systems:  Review of Systems   Respiratory: Negative for shortness of breath.    Cardiovascular: Negative for chest pain.   Gastrointestinal: Negative for nausea, vomiting, abdominal pain and diarrhea.   Genitourinary: Negative for dysuria.   Musculoskeletal:        Left leg pain post op, cramps better       Physical Exam:  Physical Exam   Constitutional: She is oriented to person, place, and time. She appears well-developed and well-nourished. No distress.   HENT:   Head: Normocephalic and atraumatic.   Eyes: EOM are normal. Pupils are equal, round, and reactive to light. Right eye exhibits no discharge. Left eye exhibits no discharge.   Neck: Neck supple.   Cardiovascular: Normal rate and regular rhythm.    Pulmonary/Chest: Effort normal and breath sounds normal.   Abdominal: Soft. Bowel sounds are normal. She exhibits no distension. There is no tenderness.   Neurological: She is alert and oriented to person, place, and time.   Skin: Skin is warm and dry. She is not diaphoretic.   Psychiatric: She has a normal mood and affect.   Nursing note and vitals reviewed.      Labs:        Invalid input(s): [id number]      Recent Labs      01/28/17   0204   SODIUM  137   POTASSIUM  4.6   CHLORIDE  105   CO2  28   BUN  12   CREATININE  0.73   CALCIUM  9.1     Recent Labs      01/28/17   0204   GLUCOSE  173*     Recent Labs      01/28/17 0204   17   015   RBC  4.25  3.72*   HEMOGLOBIN  12.4  11.0*   HEMATOCRIT  38.5  33.8*   PLATELETCT  186  149*     Recent Labs      17   0204  17   WBC  14.3*  14.9*   NEUTSPOLYS   --   76.00*   LYMPHOCYTES   --   13.80*   MONOCYTES   --   9.20   EOSINOPHILS   --   0.40   BASOPHILS   --   0.30           Hemodynamics:  Temp (24hrs), Av.6 °C (97.9 °F), Min:36.2 °C (97.1 °F), Max:36.8 °C (98.3 °F)  Temperature: 36.2 °C (97.1 °F)  Pulse  Av.6  Min: 51  Max: 91   Blood Pressure: 137/73 mmHg     Respiratory:    Respiration: 18, Pulse Oximetry: 99 %        RUL Breath Sounds: Clear, RML Breath Sounds: Diminished, RLL Breath Sounds: Diminished, RUPERT Breath Sounds: Clear, LLL Breath Sounds: Diminished  Fluids:    Intake/Output Summary (Last 24 hours) at 17 1745  Last data filed at 17 0518   Gross per 24 hour   Intake    500 ml   Output    700 ml   Net   -200 ml        GI/Nutrition:  Orders Placed This Encounter   Procedures   • DIET ORDER     Standing Status: Standing      Number of Occurrences: 1      Standing Expiration Date:      Order Specific Question:  Diet:     Answer:  Diabetic [3]     Medical Decision Making, by Problem:  Active Hospital Problems    Diagnosis   • Leukocytosis [D72.829] no change- check in AM no source of infection   • BMI 35.0-35.9,adult [Z68.35]   • DM (diabetes mellitus), type 2 (CMS-HCC) [E11.9] diet SSI, lantus- @ goal   • COPD (chronic obstructive pulmonary disease) (CMS-HCC) [J44.9] no exacerbation   • HTN (hypertension) [I10] @ goal   • Fracture of distal femur LEFT (CMS-HCC) [S72.409A] ORIF plate and screws  (Walker) TTWB, flexeril prn for cramps, probably dispo to SNF       Labs reviewed and Medications reviewed  Burroughs catheter: No Burroughs      DVT Prophylaxis: Enoxaparin (Lovenox)    Ulcer prophylaxis: Not indicated    Assessed for rehab: Patient was assess for and/or received rehabilitation services during this hospitalization

## 2017-01-31 NOTE — PROGRESS NOTES
Assumed care from night shift, Pt A+Ox4 able to make needs known, Up OOB in chair for breakfast. pain controled with PRN pain medication 7/10.  Tolerating solids and fluids.  IS using properly.    Call light with in reach of pt, uses appropriately.

## 2017-02-01 LAB
ERYTHROCYTE [DISTWIDTH] IN BLOOD BY AUTOMATED COUNT: 43.4 FL (ref 35.9–50)
FOLATE SERPL-MCNC: 12.3 NG/ML
GLUCOSE BLD-MCNC: 140 MG/DL (ref 65–99)
GLUCOSE BLD-MCNC: 146 MG/DL (ref 65–99)
GLUCOSE BLD-MCNC: 185 MG/DL (ref 65–99)
GLUCOSE BLD-MCNC: 192 MG/DL (ref 65–99)
GLUCOSE BLD-MCNC: 272 MG/DL (ref 65–99)
HCT VFR BLD AUTO: 28.5 % (ref 37–47)
HGB BLD-MCNC: 9.2 G/DL (ref 12–16)
IRON SATN MFR SERPL: 10 % (ref 15–55)
IRON SERPL-MCNC: 30 UG/DL (ref 40–170)
MCH RBC QN AUTO: 29.4 PG (ref 27–33)
MCHC RBC AUTO-ENTMCNC: 32.3 G/DL (ref 33.6–35)
MCV RBC AUTO: 91.1 FL (ref 81.4–97.8)
PLATELET # BLD AUTO: 205 K/UL (ref 164–446)
PMV BLD AUTO: 10.4 FL (ref 9–12.9)
RBC # BLD AUTO: 3.13 M/UL (ref 4.2–5.4)
TIBC SERPL-MCNC: 298 UG/DL (ref 250–450)
VIT B12 SERPL-MCNC: 324 PG/ML (ref 211–911)
WBC # BLD AUTO: 14.1 K/UL (ref 4.8–10.8)

## 2017-02-01 PROCEDURE — 97110 THERAPEUTIC EXERCISES: CPT

## 2017-02-01 PROCEDURE — 700105 HCHG RX REV CODE 258: Performed by: HOSPITALIST

## 2017-02-01 PROCEDURE — 700102 HCHG RX REV CODE 250 W/ 637 OVERRIDE(OP)

## 2017-02-01 PROCEDURE — 700102 HCHG RX REV CODE 250 W/ 637 OVERRIDE(OP): Performed by: INTERNAL MEDICINE

## 2017-02-01 PROCEDURE — A9270 NON-COVERED ITEM OR SERVICE: HCPCS | Performed by: HOSPITALIST

## 2017-02-01 PROCEDURE — 99233 SBSQ HOSP IP/OBS HIGH 50: CPT | Performed by: HOSPITALIST

## 2017-02-01 PROCEDURE — 82607 VITAMIN B-12: CPT

## 2017-02-01 PROCEDURE — 83540 ASSAY OF IRON: CPT

## 2017-02-01 PROCEDURE — 82962 GLUCOSE BLOOD TEST: CPT

## 2017-02-01 PROCEDURE — 97116 GAIT TRAINING THERAPY: CPT

## 2017-02-01 PROCEDURE — A9270 NON-COVERED ITEM OR SERVICE: HCPCS

## 2017-02-01 PROCEDURE — 700111 HCHG RX REV CODE 636 W/ 250 OVERRIDE (IP)

## 2017-02-01 PROCEDURE — 700111 HCHG RX REV CODE 636 W/ 250 OVERRIDE (IP): Performed by: INTERNAL MEDICINE

## 2017-02-01 PROCEDURE — 700105 HCHG RX REV CODE 258

## 2017-02-01 PROCEDURE — A9270 NON-COVERED ITEM OR SERVICE: HCPCS | Performed by: INTERNAL MEDICINE

## 2017-02-01 PROCEDURE — 700102 HCHG RX REV CODE 250 W/ 637 OVERRIDE(OP): Performed by: HOSPITALIST

## 2017-02-01 PROCEDURE — 770006 HCHG ROOM/CARE - MED/SURG/GYN SEMI*

## 2017-02-01 PROCEDURE — 36415 COLL VENOUS BLD VENIPUNCTURE: CPT

## 2017-02-01 PROCEDURE — 82746 ASSAY OF FOLIC ACID SERUM: CPT

## 2017-02-01 PROCEDURE — 83550 IRON BINDING TEST: CPT

## 2017-02-01 PROCEDURE — 85027 COMPLETE CBC AUTOMATED: CPT

## 2017-02-01 RX ORDER — AMOXICILLIN 250 MG
1 CAPSULE ORAL NIGHTLY
Status: DISCONTINUED | OUTPATIENT
Start: 2017-02-01 | End: 2017-02-06 | Stop reason: HOSPADM

## 2017-02-01 RX ORDER — FERROUS SULFATE 325(65) MG
325 TABLET ORAL
Status: DISCONTINUED | OUTPATIENT
Start: 2017-02-02 | End: 2017-02-06 | Stop reason: HOSPADM

## 2017-02-01 RX ORDER — DIPHENHYDRAMINE HYDROCHLORIDE 50 MG/ML
25 INJECTION INTRAMUSCULAR; INTRAVENOUS ONCE
Status: COMPLETED | OUTPATIENT
Start: 2017-02-01 | End: 2017-02-01

## 2017-02-01 RX ORDER — AMOXICILLIN 250 MG
1 CAPSULE ORAL
Status: DISCONTINUED | OUTPATIENT
Start: 2017-02-01 | End: 2017-02-06 | Stop reason: HOSPADM

## 2017-02-01 RX ORDER — DOCUSATE SODIUM 100 MG/1
100 CAPSULE, LIQUID FILLED ORAL EVERY MORNING
Status: DISCONTINUED | OUTPATIENT
Start: 2017-02-02 | End: 2017-02-06 | Stop reason: HOSPADM

## 2017-02-01 RX ORDER — ACETAMINOPHEN 325 MG/1
650 TABLET ORAL ONCE
Status: COMPLETED | OUTPATIENT
Start: 2017-02-01 | End: 2017-02-01

## 2017-02-01 RX ORDER — DIPHENHYDRAMINE HCL 25 MG
25 TABLET ORAL ONCE
Status: COMPLETED | OUTPATIENT
Start: 2017-02-01 | End: 2017-02-01

## 2017-02-01 RX ORDER — FERROUS SULFATE 325(65) MG
325 TABLET ORAL
Qty: 30 TAB | Status: SHIPPED | DISCHARGE
Start: 2017-02-02 | End: 2022-05-31

## 2017-02-01 RX ORDER — ENEMA 19; 7 G/133ML; G/133ML
1 ENEMA RECTAL
Status: DISCONTINUED | OUTPATIENT
Start: 2017-02-01 | End: 2017-02-06 | Stop reason: HOSPADM

## 2017-02-01 RX ORDER — LACTULOSE 20 G/30ML
30 SOLUTION ORAL
Status: DISCONTINUED | OUTPATIENT
Start: 2017-02-01 | End: 2017-02-06 | Stop reason: HOSPADM

## 2017-02-01 RX ORDER — BISACODYL 10 MG
10 SUPPOSITORY, RECTAL RECTAL ONCE
Status: ACTIVE | OUTPATIENT
Start: 2017-02-01 | End: 2017-02-02

## 2017-02-01 RX ADMIN — CARVEDILOL 9.38 MG: 6.25 TABLET, FILM COATED ORAL at 16:28

## 2017-02-01 RX ADMIN — IRON DEXTRAN 1300 MG: 50 INJECTION INTRAMUSCULAR; INTRAVENOUS at 23:03

## 2017-02-01 RX ADMIN — INSULIN GLARGINE 10 UNITS: 100 INJECTION, SOLUTION SUBCUTANEOUS at 22:19

## 2017-02-01 RX ADMIN — VITAMIN D, TAB 1000IU (100/BT) 1000 UNITS: 25 TAB at 10:50

## 2017-02-01 RX ADMIN — ALENDRONATE SODIUM 10 MG: 10 TABLET ORAL at 06:28

## 2017-02-01 RX ADMIN — CALCIUM CARBONATE-CHOLECALCIFEROL TAB 250 MG-125 UNIT 1 TABLET: 250-125 TAB at 10:50

## 2017-02-01 RX ADMIN — OXYCODONE HYDROCHLORIDE 5 MG: 5 TABLET ORAL at 23:10

## 2017-02-01 RX ADMIN — INSULIN LISPRO 5 UNITS: 100 INJECTION, SOLUTION INTRAVENOUS; SUBCUTANEOUS at 22:18

## 2017-02-01 RX ADMIN — ACETAMINOPHEN 650 MG: 325 TABLET, FILM COATED ORAL at 16:27

## 2017-02-01 RX ADMIN — ACETAMINOPHEN 650 MG: 325 TABLET, FILM COATED ORAL at 10:54

## 2017-02-01 RX ADMIN — DIPHENHYDRAMINE HCL 25 MG: 25 TABLET ORAL at 16:28

## 2017-02-01 RX ADMIN — GABAPENTIN 100 MG: 100 CAPSULE ORAL at 22:14

## 2017-02-01 RX ADMIN — ANTACID TABLETS 1000 MG: 500 TABLET, CHEWABLE ORAL at 10:50

## 2017-02-01 RX ADMIN — OXYCODONE HYDROCHLORIDE 5 MG: 5 TABLET ORAL at 16:25

## 2017-02-01 RX ADMIN — OXYCODONE HYDROCHLORIDE 5 MG: 5 TABLET ORAL at 10:54

## 2017-02-01 RX ADMIN — ENOXAPARIN SODIUM 40 MG: 100 INJECTION SUBCUTANEOUS at 22:14

## 2017-02-01 RX ADMIN — CALCIUM CARBONATE-CHOLECALCIFEROL TAB 250 MG-125 UNIT 1 TABLET: 250-125 TAB at 22:14

## 2017-02-01 RX ADMIN — BISACODYL 10 MG: 10 SUPPOSITORY RECTAL at 06:28

## 2017-02-01 RX ADMIN — OXYCODONE HYDROCHLORIDE 5 MG: 5 TABLET ORAL at 05:11

## 2017-02-01 RX ADMIN — IRON DEXTRAN 25 MG: 50 INJECTION INTRAMUSCULAR; INTRAVENOUS at 17:25

## 2017-02-01 RX ADMIN — STANDARDIZED SENNA CONCENTRATE AND DOCUSATE SODIUM 1 TABLET: 8.6; 5 TABLET, FILM COATED ORAL at 22:14

## 2017-02-01 RX ADMIN — INSULIN LISPRO 2 UNITS: 100 INJECTION, SOLUTION INTRAVENOUS; SUBCUTANEOUS at 11:17

## 2017-02-01 RX ADMIN — OXYCODONE HYDROCHLORIDE 5 MG: 5 TABLET ORAL at 01:07

## 2017-02-01 RX ADMIN — SODIUM CHLORIDE: 9 INJECTION, SOLUTION INTRAVENOUS at 05:14

## 2017-02-01 ASSESSMENT — PAIN SCALES - GENERAL
PAINLEVEL_OUTOF10: 5
PAINLEVEL_OUTOF10: ASSUMED PAIN PRESENT
PAINLEVEL_OUTOF10: ASSUMED PAIN PRESENT
PAINLEVEL_OUTOF10: 5
PAINLEVEL_OUTOF10: 5

## 2017-02-01 ASSESSMENT — GAIT ASSESSMENTS
GAIT LEVEL OF ASSIST: MINIMAL ASSIST
DISTANCE (FEET): 5
ASSISTIVE DEVICE: FRONT WHEEL WALKER

## 2017-02-01 NOTE — DISCHARGE SUMMARY
DISCHARGE DIAGNOSES:  1.  Ground level fall with left distal femoral fracture, status post open   reduction and internal fixation.  2.  Uncontrolled diabetes.  3.  Thrombocytopenia.  4.  Hypertension.  5.  Constipation due to narcotics.    OTHER DIAGNOSES:  1.  Recent hospitalization for sepsis.  2.  Obesity with BMI greater than 35.  3.  Diabetes type 2.  4.  Hypertension.  5.  Dyslipidemia.  6.  Chronic obstructive pulmonary disease.  7.  Recent tobacco cessation.    ADMISSION HISTORY:  Please refer to admission note of 01/27/2017 for details.    HOSPITAL COURSE:  The patient is a 61-year-old female with history of multiple   medical problems including diabetes, obesity and recent hospitalization for   sepsis.  Patient apparently had a fall with _____ left hip pain.  Upon   admission, the patient underwent chest x-ray with negative findings.  EKG   showed normal sinus rhythm.  Left lower extremity CT showed distal femoral   fracture.  Orthopedic consultation was obtained.  The patient underwent left   femoral ORIF to which she tolerated well without complications.    Postoperatively, the PT, OT evaluation indicated that patient would benefit   from skilled nursing placement.  Patient has been requested for skilled   nursing placement.  At the time of the dictation, patient was feeling better,   eating and drinking well, having good bowel movements, and was hemodynamically   stable.    CONSULTATIONS OBTAINED:  Dr. Ulysses Carlos of orthopedic surgery.    PROCEDURES:  Chest x-ray, left _____ CT on 01/27/2017, left femur x-ray on   01/27/2017 and left femur ORIF on 01/27/2017.    TRANSFER MEDICATIONS:  1.  Tylenol 650 mg q. 6 hours for moderate pain.  2.  Albuterol MDI 2 puffs q. 6 hours for shortness of breath.  3.  Alendronate 10 mg daily.  4.  Oyster shell calcium 1 tab b.i.d.  5.  Carvedilol 9.375 mg b.i.d. with meals.  6.  Cyclobenzaprine 5 mg t.i.d. p.r.n. for muscle spasm.  7.  Enoxaparin 40 mg subcutaneous  at bedtime.  8.  Gabapentin 100 mg at bedtime.  9.  Insulin sliding scale.  10.  Lisinopril 40 mg daily.  11.  Oxycodone 5 mg q. 4 hours p.r.n. for severe pain.  12.  Tiotropium 18 mcg inhaled daily.  13.  Vitamin D 1000 units daily.    DISCHARGE DIET:  1600 kilocalorie diabetic cardiac diet.    DISCHARGE ACTIVITY:  Gradual increase in activity per skilled nursing   facility.    DISCHARGE FOLLOWUP:  1.  With Dr. Neftaly Meehan, patient's primary care provider, per skilled nursing   facility recommendation.  2.  With Dr. Ulysses Carlos of orthopedic surgery per his recommendation.    TOTAL DISCHARGE TIME PROCESS:  Thirty-five minutes.       ____________________________________     MD SHELLEY CHILEL / HERNANDO    DD:  01/31/2017 15:24:12  DT:  01/31/2017 17:44:56    D#:  [id number]  Job#:  [id number]    cc: Neftaly Meehan MD

## 2017-02-01 NOTE — CARE PLAN
Problem: Safety  Goal: Will remain free from injury  Outcome: PROGRESSING AS EXPECTED  Bed in low locked position, call light in place. Safety interventions explained to patient. Patient calls appropriately.     Problem: Venous Thromboembolism (VTW)/Deep Vein Thrombosis (DVT) Prevention:  Goal: Patient will participate in Venous Thrombosis (VTE)/Deep Vein Thrombosis (DVT)Prevention Measures  Outcome: PROGRESSING SLOWER THAN EXPECTED  Intervention: Ensure patient wears graduated elastic stockings (KATELYN hose) and/or SCDs, if ordered, when in bed or chair (Remove at least once per shift for skin check)  SCDs in place       Intervention: Encourage patient to perform ankle flex, foot rotation, and knee flex exercises in addition to other prophylatic measures every hour while awake  ENcouarged patient to perform ROM   Intervention: Encourage ambulation/mobilization at level directed by Physical Therapy in collaboration with Interdisciplinary Team  Max assist to commode unable to ambulate distance more than 5 ft

## 2017-02-01 NOTE — DISCHARGE PLANNING
Medical Social Work    SW met with pt at bedside. Pt reported that she spoke with Pily who works in the HR department at her work. Pt has primary insurance through [email or web address] and [employer]. Pt's group number is [id number]. The pt's workman's comp is through [employer] and the claim# is [id number]. [employer] Assigned Risk's number is [phone]. There fax is [phone].     Per PFA's note from 2/1/17, pt still does not have a claim's adjustor or a file on claim.     Plan: Pt will transfer to SNF once we have insurance auth.

## 2017-02-01 NOTE — PROGRESS NOTES
"   Orthopaedic Progress Note    Interval changes:  Dressing CDI  Cleared for DC to SNF pending medicine clearance   Work comp coverage pending     ROS - Patient denies any new issues.  Pain well controlled.    Blood pressure 105/63, pulse 98, temperature 35.6 °C (96 °F), resp. rate 16, height 1.6 m (5' 3\"), weight 90.719 kg (200 lb), SpO2 91 %, not currently breastfeeding.      Patient seen and examined  No acute distress  Breathing non labored  RRR  LLE surgical dressings CDI.  Patient clearly fires tibialis anterior, EHL, and gastrocnemius/soleus. Sensation is intact to light touch throughout superficial peroneal, deep peroneal, tibial, saphenous, and sural nerve distributions. Strong and palpable 2+ dorsalis pedis and posterior tibial pulses with capillary refill less than 2 seconds. No lower leg tenderness or discomfort.      Recent Labs      01/31/17   0236  02/01/17   0318   WBC  14.7*  14.1*   RBC  3.58*  3.13*   HEMOGLOBIN  10.2*  9.2*   HEMATOCRIT  31.6*  28.5*   MCV  88.3  91.1   MCH  28.5  29.4   MCHC  32.3*  32.3*   RDW  40.8  43.4   PLATELETCT  182  205   MPV  10.7  10.4       Active Hospital Problems    Diagnosis   • Leukocytosis [D72.829]   • BMI 35.0-35.9,adult [Z68.35]   • DM (diabetes mellitus), type 2 (CMS-HCC) [E11.9]   • COPD (chronic obstructive pulmonary disease) (CMS-HCC) [J44.9]   • HTN (hypertension) [I10]   • Fracture of distal femur (CMS-HCC) [S72.409A]       Assessment/Plan:  Cleared for DC pending work comp coverage and medicine clearance  POD#4 S/P Open reduction internal fixation of left supracondylar, intercondylar distal femur fracture.  Wt bearing status - TTWB LLE  Wound care/Drains - dressing changed every other day by nursing  Future Procedures - none planned   Lovenox: Start 1/27, Duration-until ambulatory > 150'  Sutures/Staples out- 14 days post operatively  PT/OT-initiated  Antibiotics: completed   DVT Prophylaxis- TEDS/SCDs/Foot pumps  Burroughs-none   Case Coordination for " Discharge Planning - Disposition SNF

## 2017-02-01 NOTE — PROGRESS NOTES
Pt blood pressure dropped from SBP of  130- 150 to 99/61. Patient is asymptomatic  No s/s of bleeding present   BP rechecked after aprox one hour BP increased to SBP of 122.

## 2017-02-01 NOTE — DISCHARGE SUMMARY
Since the transfer summary was dictated on 1/31/2017 patient remained in the hospital awaiting SNF disposition, remaining hemodynamically stable.  Pt is to receive Fe transfusion since the summary was dictated.  The updated medication list is as below.  She is to continue with the follow up as outlined in the previous summary, otherwise.    Total DC time process 43 min.      MEDICATIONS ON DISCHARGE   Miley Whittington   Home Medication Instructions LORI:[id number]    Printed on:02/01/17 7819   Medication Information                      acetaminophen (TYLENOL) 325 MG Tab  Take 2 Tabs by mouth every 6 hours as needed for Fever or Moderate Pain (Temp >101.5F).             albuterol 108 (90 BASE) MCG/ACT Aero Soln inhalation aerosol  Inhale 2 Puffs by mouth every 6 hours as needed for Shortness of Breath.             alendronate (FOSAMAX) 10 MG Tab  Take 1 Tab by mouth every morning before breakfast.             Calcium Carb-Cholecalciferol (OYSTER SHELL CALCIUM/VITAMIN D) 250-125 MG-UNIT Tab tablet  Take 1 Tab by mouth 2 Times a Day.             carvedilol (COREG) 3.125 MG Tab  Take 3 Tabs by mouth 2 times a day, with meals.             cyclobenzaprine (FLEXERIL) 5 MG tablet  Take 1 Tab by mouth 3 times a day as needed for Muscle Spasms.             enoxaparin (LOVENOX) 40 MG/0.4ML Solution inj  Inject 40 mg as instructed every bedtime.             ferrous sulfate 325 (65 FE) MG tablet  Take 1 Tab by mouth every morning with breakfast.             gabapentin (NEURONTIN) 100 MG Cap  Take 100 mg by mouth every bedtime.             insulin lispro (HUMALOG) 100 UNIT/ML Solution  Inject 2-9 Units as instructed 4 Times a Day,Before Meals and at Bedtime.             lisinopril (PRINIVIL, ZESTRIL) 40 MG tablet  Take 1 Tab by mouth every day.             oxycodone immediate-release (ROXICODONE) 5 MG Tab  Take 1 Tab by mouth every four hours as needed for Severe Pain.             tiotropium (SPIRIVA) 18 MCG Cap  Inhale 18 mcg by  mouth every day.             vitamin D (VITAMIND D3) 1000 UNIT Tab  Take 1 Tab by mouth every day.

## 2017-02-01 NOTE — PROGRESS NOTES
"S:  Seen and examined.  POD #3 s/p L distal femur ORIF.  Doing well this morning.  No new complaints, pain controlled.    O: Blood pressure 150/73, pulse 85, temperature 36.6 °C (97.8 °F), resp. rate 18, height 1.6 m (5' 3\"), weight 90.719 kg (200 lb), SpO2 95 %, not currently breastfeeding..    Intake/Output Summary (Last 24 hours) at 01/31/17 1647  Last data filed at 01/31/17 1000   Gross per 24 hour   Intake   1000 ml   Output      0 ml   Net   1000 ml   .    Operative/injured extremity examined.  Compartments soft, distal light touch sensation intact, firing EHL/TA/GS/P.  Toes warm, well-perfused.  Wound c/d/i    Recent Labs      01/29/17   0157  01/31/17   0236   WBC  14.9*  14.7*   RBC  3.72*  3.58*   HEMOGLOBIN  11.0*  10.2*   HEMATOCRIT  33.8*  31.6*   MCV  90.9  88.3   MCH  29.6  28.5   MCHC  32.5*  32.3*   RDW  43.6  40.8   PLATELETCT  149*  182   MPV  10.4  10.7       A/P:    POD #3 s/p L distal femur ORIF    Antibiotics: Mahogany-op Ancef x2 doses  Activity: TTWB operative extremity.  PT today.  OK to begin knee ROM.  Diet: General  DVT: Mechanical (SCDs) + Pharmacologic (Lovenox, d/c on Aspirin 325 mg BID)  Dispo: D/C planning  "

## 2017-02-01 NOTE — THERAPY
"Physical Therapy Treatment completed.   Bed Mobility:  Supine to Sit: Moderate Assist  Transfers: Sit to Stand: Maximal Assist  Gait: Level Of Assist: Minimal Assist with Front-Wheel Walker for hop-to gait 5 ft      Plan of Care: Will benefit from Physical Therapy 3 times per week  Discharge Recommendations: Equipment: Front-Wheel Walker. Post-acute therapy recommended before discharged home.     See \"Rehab Therapy-Acute\" Patient Summary Report for complete documentation.     Pt progressed gait today with instruction to maintain NWB and use heavy support of UE's on FWW. Continues with signifincant pain and weakness LLE with position changes.   "

## 2017-02-02 LAB
ERYTHROCYTE [DISTWIDTH] IN BLOOD BY AUTOMATED COUNT: 42.1 FL (ref 35.9–50)
GLUCOSE BLD-MCNC: 147 MG/DL (ref 65–99)
GLUCOSE BLD-MCNC: 157 MG/DL (ref 65–99)
GLUCOSE BLD-MCNC: 158 MG/DL (ref 65–99)
GLUCOSE BLD-MCNC: 245 MG/DL (ref 65–99)
HCT VFR BLD AUTO: 29.6 % (ref 37–47)
HGB BLD-MCNC: 9.5 G/DL (ref 12–16)
MCH RBC QN AUTO: 28.5 PG (ref 27–33)
MCHC RBC AUTO-ENTMCNC: 32.1 G/DL (ref 33.6–35)
MCV RBC AUTO: 88.9 FL (ref 81.4–97.8)
PLATELET # BLD AUTO: 246 K/UL (ref 164–446)
PMV BLD AUTO: 9.9 FL (ref 9–12.9)
RBC # BLD AUTO: 3.33 M/UL (ref 4.2–5.4)
WBC # BLD AUTO: 11.4 K/UL (ref 4.8–10.8)

## 2017-02-02 PROCEDURE — 700102 HCHG RX REV CODE 250 W/ 637 OVERRIDE(OP): Performed by: INTERNAL MEDICINE

## 2017-02-02 PROCEDURE — 82962 GLUCOSE BLOOD TEST: CPT | Mod: 91

## 2017-02-02 PROCEDURE — 700102 HCHG RX REV CODE 250 W/ 637 OVERRIDE(OP): Performed by: HOSPITALIST

## 2017-02-02 PROCEDURE — 99233 SBSQ HOSP IP/OBS HIGH 50: CPT | Performed by: HOSPITALIST

## 2017-02-02 PROCEDURE — A9270 NON-COVERED ITEM OR SERVICE: HCPCS | Performed by: HOSPITALIST

## 2017-02-02 PROCEDURE — 770006 HCHG ROOM/CARE - MED/SURG/GYN SEMI*

## 2017-02-02 PROCEDURE — 700112 HCHG RX REV CODE 229: Performed by: HOSPITALIST

## 2017-02-02 PROCEDURE — 85027 COMPLETE CBC AUTOMATED: CPT

## 2017-02-02 PROCEDURE — A9270 NON-COVERED ITEM OR SERVICE: HCPCS | Performed by: INTERNAL MEDICINE

## 2017-02-02 PROCEDURE — 36415 COLL VENOUS BLD VENIPUNCTURE: CPT

## 2017-02-02 PROCEDURE — 700111 HCHG RX REV CODE 636 W/ 250 OVERRIDE (IP): Performed by: INTERNAL MEDICINE

## 2017-02-02 RX ADMIN — OXYCODONE HYDROCHLORIDE 5 MG: 5 TABLET ORAL at 22:26

## 2017-02-02 RX ADMIN — INSULIN LISPRO 3 UNITS: 100 INJECTION, SOLUTION INTRAVENOUS; SUBCUTANEOUS at 22:23

## 2017-02-02 RX ADMIN — ANTACID TABLETS 1000 MG: 500 TABLET, CHEWABLE ORAL at 11:14

## 2017-02-02 RX ADMIN — OXYCODONE HYDROCHLORIDE 5 MG: 5 TABLET ORAL at 11:15

## 2017-02-02 RX ADMIN — ALENDRONATE SODIUM 10 MG: 10 TABLET ORAL at 06:00

## 2017-02-02 RX ADMIN — INSULIN LISPRO 2 UNITS: 100 INJECTION, SOLUTION INTRAVENOUS; SUBCUTANEOUS at 06:00

## 2017-02-02 RX ADMIN — OXYCODONE HYDROCHLORIDE 5 MG: 5 TABLET ORAL at 05:59

## 2017-02-02 RX ADMIN — INSULIN GLARGINE 10 UNITS: 100 INJECTION, SOLUTION SUBCUTANEOUS at 22:20

## 2017-02-02 RX ADMIN — CARVEDILOL 9.38 MG: 6.25 TABLET, FILM COATED ORAL at 11:24

## 2017-02-02 RX ADMIN — DOCUSATE SODIUM 100 MG: 100 CAPSULE ORAL at 11:24

## 2017-02-02 RX ADMIN — ACETAMINOPHEN 650 MG: 325 TABLET, FILM COATED ORAL at 11:14

## 2017-02-02 RX ADMIN — LISINOPRIL 40 MG: 20 TABLET ORAL at 11:13

## 2017-02-02 RX ADMIN — OXYCODONE HYDROCHLORIDE 5 MG: 5 TABLET ORAL at 17:01

## 2017-02-02 RX ADMIN — GABAPENTIN 100 MG: 100 CAPSULE ORAL at 22:17

## 2017-02-02 RX ADMIN — CALCIUM CARBONATE-CHOLECALCIFEROL TAB 250 MG-125 UNIT 1 TABLET: 250-125 TAB at 11:14

## 2017-02-02 RX ADMIN — ENOXAPARIN SODIUM 40 MG: 100 INJECTION SUBCUTANEOUS at 22:18

## 2017-02-02 RX ADMIN — Medication 325 MG: at 11:13

## 2017-02-02 RX ADMIN — CARVEDILOL 9.38 MG: 6.25 TABLET, FILM COATED ORAL at 17:01

## 2017-02-02 RX ADMIN — VITAMIN D, TAB 1000IU (100/BT) 1000 UNITS: 25 TAB at 11:14

## 2017-02-02 RX ADMIN — STANDARDIZED SENNA CONCENTRATE AND DOCUSATE SODIUM 1 TABLET: 8.6; 5 TABLET, FILM COATED ORAL at 22:18

## 2017-02-02 RX ADMIN — INSULIN LISPRO 2 UNITS: 100 INJECTION, SOLUTION INTRAVENOUS; SUBCUTANEOUS at 16:56

## 2017-02-02 RX ADMIN — CALCIUM CARBONATE-CHOLECALCIFEROL TAB 250 MG-125 UNIT 1 TABLET: 250-125 TAB at 22:17

## 2017-02-02 ASSESSMENT — ENCOUNTER SYMPTOMS
MYALGIAS: 1
CHILLS: 0
FLANK PAIN: 0
FEVER: 0
NAUSEA: 0
VOMITING: 0

## 2017-02-02 ASSESSMENT — PAIN SCALES - GENERAL
PAINLEVEL_OUTOF10: 4
PAINLEVEL_OUTOF10: 6
PAINLEVEL_OUTOF10: 5

## 2017-02-02 NOTE — PROGRESS NOTES
Hospital Medicine Progress Note, Adult, Complex               Author: Chet Cooper Date & Time created: 2/2/2017  2:19 PM     Interval History:  Admitted for L-femur frac.  She is s/p L-femur ORIF.  Feeling Ok.  Waiting on workman's comp disposition. Having burning sensation when urinating.  Family at bedside.    Review of Systems:  Review of Systems   Constitutional: Negative for fever and chills.   Gastrointestinal: Negative for nausea and vomiting.   Genitourinary: Positive for dysuria. Negative for flank pain.   Musculoskeletal: Positive for myalgias and joint pain.   All other systems reviewed and are negative.      Physical Exam:  Physical Exam  Nursing note and vitals reviewed.  Constitutional: She is oriented to person, place, and time. She appears well-developed and well-nourished overweight.   HENT:   Head: Normocephalic and atraumatic.   Right Ear: External ear normal.   Left Ear: External ear normal.   Nose: Nose normal.   Mouth/Throat: Oropharynx is small with Mallanpati score of 3-4.  Mucosa is clear and moist.   Eyes: Conjunctivae and extraocular motions are normal. Pupils are equal, round, and reactive to light.   Neck: Normal range of motion. Neck supple.   Cardiovascular: Normal rate, regular rhythm, normal heart sounds and intact distal pulses.    Pulmonary/Chest: Effort normal and breath sounds normal.   Abdominal: Soft. Bowel sounds are normal.   Musculoskeletal: Decreased L-hip range of motion.   Neurological: She is alert and oriented to person, place, and time.   Skin: Skin is warm and dry.       Labs:        Invalid input(s): [id number]      No results for input(s): SODIUM, POTASSIUM, CHLORIDE, CO2, BUN, CREATININE, MAGNESIUM, PHOSPHORUS, CALCIUM in the last 72 hours.  No results for input(s): ALTSGPT, ASTSGOT, ALKPHOSPHAT, TBILIRUBIN, DBILIRUBIN, GAMMAGT, AMYLASE, LIPASE, ALB, PREALBUMIN, GLUCOSE in the last 72 hours.  Recent Labs      01/31/17   0236  02/01/17   0318  02/01/17   0909   17   0255   RBC  3.58*  3.13*   --   3.33*   HEMOGLOBIN  10.2*  9.2*   --   9.5*   HEMATOCRIT  31.6*  28.5*   --   29.6*   PLATELETCT  182  205   --   246   IRON   --    --   30*   --    TOTIRONBC   --    --   298   --      Recent Labs      17   0236  17   0318  17   0255   WBC  14.7*  14.1*  11.4*           Hemodynamics:  Temp (24hrs), Av.3 °C (97.4 °F), Min:36 °C (96.8 °F), Max:36.9 °C (98.4 °F)  Temperature: 36.9 °C (98.4 °F)  Pulse  Av.4  Min: 51  Max: 98   Blood Pressure: 149/74 mmHg     Respiratory:    Respiration: 16, Pulse Oximetry: 94 %        RML Breath Sounds: Diminished, RLL Breath Sounds: Diminished, LLL Breath Sounds: Diminished  Fluids:    Intake/Output Summary (Last 24 hours) at 17 1419  Last data filed at 17 1725   Gross per 24 hour   Intake      0 ml   Output    900 ml   Net   -900 ml        GI/Nutrition:  Orders Placed This Encounter   Procedures   • DIET ORDER     Standing Status: Standing      Number of Occurrences: 1      Standing Expiration Date:      Order Specific Question:  Diet:     Answer:  Diabetic [3]     Medical Decision Making, by Problem:  Active Hospital Problems    Diagnosis   • Dysuria - check UA.   • BMI 35.0-35.9,adult [Z68.35] - encourage Kcal restriction.   • DM (diabetes mellitus), type 2 (CMS-HCC) [E11.9] - uncontrolled.  HbA1c is 8.  Home regimen and cover c ISS.   • COPD (chronic obstructive pulmonary disease) (CMS-HCC) [J44.9] - O2, RT, IS, Vax.   • HTN (hypertension) [I10] - better. CCR and follow.   • Fracture of distal femur (CMS-HCC) [S72.409A] - Fosamax, calcium and vit ID.  Ortho on.   Debility - PT/OT/SNF eval.  Anemia - Fe deficient.  Fe replacement.  Stable issues - med hx (tobacco, DLD, recent sepsis), constipation, thrombocytopenia,   Preventives - IS, Vax, stool soft, DVTP.  Dispo - complex/guarded.      EKG reviewed, Medications reviewed, Radiology images reviewed and Labs reviewed  Burroughs catheter: No  Burroughs      DVT Prophylaxis: Enoxaparin (Lovenox)    Ulcer prophylaxis: Not indicated    Assessed for rehab: Patient was assess for and/or received rehabilitation services during this hospitalization

## 2017-02-02 NOTE — DISCHARGE PLANNING
Medical Social Work      SW received VM from [place] from 's workman's comp who reported that the claim would be transferred to a complex care team and that they would be contacting this SW once they have received the claim.

## 2017-02-02 NOTE — CARE PLAN
Problem: Safety  Goal: Will remain free from injury  Outcome: PROGRESSING AS EXPECTED  No injury

## 2017-02-02 NOTE — DISCHARGE PLANNING
Medical Social Work    SW spoke with workman's comp claim adjustor, Janine, at [phone]. Prisca reported that she only just got the claim yesterday afternoon and that she only does medical claims. She reports that the claim will be transferred to another adjustor today. SW provided Janine her contact information and the new claim's adjustor will contact this SW regarding post acute placement services.

## 2017-02-02 NOTE — DISCHARGE PLANNING
Medical Social Work    Per PFA note, assigned to workman's comp claim is Janine Chang. Phone number is  [phone]. Claim number is [id number].

## 2017-02-03 LAB
APPEARANCE UR: CLEAR
BACTERIA #/AREA URNS HPF: ABNORMAL /HPF
BILIRUB UR QL STRIP.AUTO: NEGATIVE
COLOR UR: COLORLESS
EPI CELLS #/AREA URNS HPF: ABNORMAL /HPF
GLUCOSE BLD-MCNC: 120 MG/DL (ref 65–99)
GLUCOSE BLD-MCNC: 184 MG/DL (ref 65–99)
GLUCOSE BLD-MCNC: 190 MG/DL (ref 65–99)
GLUCOSE BLD-MCNC: 200 MG/DL (ref 65–99)
GLUCOSE UR STRIP.AUTO-MCNC: NEGATIVE MG/DL
KETONES UR STRIP.AUTO-MCNC: NEGATIVE MG/DL
LEUKOCYTE ESTERASE UR QL STRIP.AUTO: ABNORMAL
MICRO URNS: ABNORMAL
NITRITE UR QL STRIP.AUTO: NEGATIVE
PH UR STRIP.AUTO: 8 [PH]
PROT UR QL STRIP: NEGATIVE MG/DL
RBC # URNS HPF: ABNORMAL /HPF
RBC UR QL AUTO: NEGATIVE
SP GR UR STRIP.AUTO: 1
WBC #/AREA URNS HPF: ABNORMAL /HPF

## 2017-02-03 PROCEDURE — 99233 SBSQ HOSP IP/OBS HIGH 50: CPT | Performed by: HOSPITALIST

## 2017-02-03 PROCEDURE — A9270 NON-COVERED ITEM OR SERVICE: HCPCS | Performed by: INTERNAL MEDICINE

## 2017-02-03 PROCEDURE — A9270 NON-COVERED ITEM OR SERVICE: HCPCS | Performed by: HOSPITALIST

## 2017-02-03 PROCEDURE — 97535 SELF CARE MNGMENT TRAINING: CPT

## 2017-02-03 PROCEDURE — 81001 URINALYSIS AUTO W/SCOPE: CPT

## 2017-02-03 PROCEDURE — 700111 HCHG RX REV CODE 636 W/ 250 OVERRIDE (IP): Performed by: INTERNAL MEDICINE

## 2017-02-03 PROCEDURE — 700111 HCHG RX REV CODE 636 W/ 250 OVERRIDE (IP)

## 2017-02-03 PROCEDURE — 770006 HCHG ROOM/CARE - MED/SURG/GYN SEMI*

## 2017-02-03 PROCEDURE — 700102 HCHG RX REV CODE 250 W/ 637 OVERRIDE(OP): Performed by: INTERNAL MEDICINE

## 2017-02-03 PROCEDURE — 87086 URINE CULTURE/COLONY COUNT: CPT

## 2017-02-03 PROCEDURE — 82962 GLUCOSE BLOOD TEST: CPT | Mod: 91

## 2017-02-03 PROCEDURE — 700102 HCHG RX REV CODE 250 W/ 637 OVERRIDE(OP): Performed by: HOSPITALIST

## 2017-02-03 PROCEDURE — 87186 SC STD MICRODIL/AGAR DIL: CPT

## 2017-02-03 PROCEDURE — 700105 HCHG RX REV CODE 258

## 2017-02-03 PROCEDURE — 700111 HCHG RX REV CODE 636 W/ 250 OVERRIDE (IP): Performed by: HOSPITALIST

## 2017-02-03 PROCEDURE — 97530 THERAPEUTIC ACTIVITIES: CPT

## 2017-02-03 PROCEDURE — 87077 CULTURE AEROBIC IDENTIFY: CPT

## 2017-02-03 PROCEDURE — 51798 US URINE CAPACITY MEASURE: CPT

## 2017-02-03 PROCEDURE — 700112 HCHG RX REV CODE 229: Performed by: HOSPITALIST

## 2017-02-03 PROCEDURE — 700105 HCHG RX REV CODE 258: Performed by: HOSPITALIST

## 2017-02-03 RX ORDER — CARVEDILOL 6.25 MG/1
12.5 TABLET ORAL 2 TIMES DAILY WITH MEALS
Status: DISCONTINUED | OUTPATIENT
Start: 2017-02-03 | End: 2017-02-06 | Stop reason: HOSPADM

## 2017-02-03 RX ADMIN — LISINOPRIL 40 MG: 20 TABLET ORAL at 08:31

## 2017-02-03 RX ADMIN — DOCUSATE SODIUM 100 MG: 100 CAPSULE ORAL at 08:31

## 2017-02-03 RX ADMIN — ALENDRONATE SODIUM 10 MG: 10 TABLET ORAL at 05:41

## 2017-02-03 RX ADMIN — INSULIN LISPRO 2 UNITS: 100 INJECTION, SOLUTION INTRAVENOUS; SUBCUTANEOUS at 11:49

## 2017-02-03 RX ADMIN — INSULIN GLARGINE 10 UNITS: 100 INJECTION, SOLUTION SUBCUTANEOUS at 20:51

## 2017-02-03 RX ADMIN — CALCIUM CARBONATE-CHOLECALCIFEROL TAB 250 MG-125 UNIT 1 TABLET: 250-125 TAB at 20:39

## 2017-02-03 RX ADMIN — CALCIUM CARBONATE-CHOLECALCIFEROL TAB 250 MG-125 UNIT 1 TABLET: 250-125 TAB at 08:31

## 2017-02-03 RX ADMIN — CARVEDILOL 12.5 MG: 6.25 TABLET, FILM COATED ORAL at 16:36

## 2017-02-03 RX ADMIN — STANDARDIZED SENNA CONCENTRATE AND DOCUSATE SODIUM 1 TABLET: 8.6; 5 TABLET, FILM COATED ORAL at 20:39

## 2017-02-03 RX ADMIN — VANCOMYCIN HYDROCHLORIDE 2300 MG: 10 INJECTION, POWDER, LYOPHILIZED, FOR SOLUTION INTRAVENOUS at 19:30

## 2017-02-03 RX ADMIN — CEFTRIAXONE 2 G: 2 INJECTION, POWDER, FOR SOLUTION INTRAMUSCULAR; INTRAVENOUS at 18:39

## 2017-02-03 RX ADMIN — INSULIN LISPRO 2 UNITS: 100 INJECTION, SOLUTION INTRAVENOUS; SUBCUTANEOUS at 20:49

## 2017-02-03 RX ADMIN — GABAPENTIN 100 MG: 100 CAPSULE ORAL at 20:39

## 2017-02-03 RX ADMIN — ENOXAPARIN SODIUM 40 MG: 100 INJECTION SUBCUTANEOUS at 20:43

## 2017-02-03 RX ADMIN — Medication 325 MG: at 08:31

## 2017-02-03 RX ADMIN — OXYCODONE HYDROCHLORIDE 5 MG: 5 TABLET ORAL at 05:41

## 2017-02-03 RX ADMIN — VITAMIN D, TAB 1000IU (100/BT) 1000 UNITS: 25 TAB at 08:31

## 2017-02-03 RX ADMIN — SODIUM CHLORIDE: 9 INJECTION, SOLUTION INTRAVENOUS at 16:23

## 2017-02-03 RX ADMIN — OXYCODONE HYDROCHLORIDE 5 MG: 5 TABLET ORAL at 20:40

## 2017-02-03 RX ADMIN — ANTACID TABLETS 1000 MG: 500 TABLET, CHEWABLE ORAL at 08:31

## 2017-02-03 RX ADMIN — INSULIN LISPRO 2 UNITS: 100 INJECTION, SOLUTION INTRAVENOUS; SUBCUTANEOUS at 16:36

## 2017-02-03 ASSESSMENT — ENCOUNTER SYMPTOMS
ABDOMINAL PAIN: 0
NAUSEA: 0
FLANK PAIN: 0
MYALGIAS: 1

## 2017-02-03 ASSESSMENT — PAIN SCALES - GENERAL
PAINLEVEL_OUTOF10: 3
PAINLEVEL_OUTOF10: 3
PAINLEVEL_OUTOF10: 6
PAINLEVEL_OUTOF10: 3

## 2017-02-03 NOTE — DISCHARGE PLANNING
Medical Social Work    SW attempted to call [facility] at [phone]. No answer, left message.     MANUELA attempted to call Lyric from [facility] at [phone]. No answer, left message.

## 2017-02-03 NOTE — CARE PLAN
Problem: Pain Management  Goal: Pain level will decrease to patient’s comfort goal  Outcome: PROGRESSING AS EXPECTED  Pt states that current pain medication regimen is controlling her pain adequately. Denies need for intervention at this time. Will monitor and address as appropriate.

## 2017-02-03 NOTE — PROGRESS NOTES
"S:  Seen and examined.   s/p L distal femur ORIF.  Resting comfortably.    O: Blood pressure 147/79, pulse 92, temperature 37.6 °C (99.7 °F), resp. rate 16, height 1.6 m (5' 3\"), weight 90.719 kg (200 lb), SpO2 92 %, not currently breastfeeding..      Intake/Output Summary (Last 24 hours) at 02/03/17 1115  Last data filed at 02/03/17 0500   Gross per 24 hour   Intake    150 ml   Output      0 ml   Net    150 ml   .    Operative/injured extremity examined.  Wound c/d/i    Recent Labs      02/01/17   0318  02/02/17   0255   WBC  14.1*  11.4*   RBC  3.13*  3.33*   HEMOGLOBIN  9.2*  9.5*   HEMATOCRIT  28.5*  29.6*   MCV  91.1  88.9   MCH  29.4  28.5   MCHC  32.3*  32.1*   RDW  43.4  42.1   PLATELETCT  205  246   MPV  10.4  9.9       A/P:    s/p L distal femur ORIF    Antibiotics: Mahogany-op Ancef x2 doses  Activity: TTWB operative extremity.  PT today.  OK to begin knee ROM  Diet: General  DVT: Mechanical (SCDs) + Pharmacologic (Lovenox, OK to d/c home on Aspirin 325 mg PO BID)  Dispo: D/C planning    "

## 2017-02-03 NOTE — THERAPY
"Occupational Therapy Treatment completed with focus on ADLs, ADL transfers and patient education.  Functional Status:  Pt was seen for Occupational Therapy treatment today, see Therapy Kardex for details.  Treatment included education in breath control with activity and at rest, self pacing techs and energy conservation for pain management. Educated pt in safety awareness techs as well. Psychosocial intervention addressed .Pt demo Mod A for supine to sit at EOB to assist BLE's off bed to floor. Required Mod A for sit to stand with FWW and for BSC transfers. Pt is set up for full toiet hygiene seated base. Mod A in standing position for mynor care . Pt sponge bathed seated base post set up with SBA; Pt demonstrated supervision for UB dressing, Min A for LB dressing with use of reacher seated base. Did require Min A to support standing dynamic balance for clothing management pants up over hips.Pt demo Supervision/SBA for seated base oral hygiene and grooming.  Attempted to ambulate to BR with FWW but pt could take only 3-4 steps and quit due to \"severe pain\" Pt is not always compliant with TTWB LLE. Pt was Mod A for sit to supine, call light in reach and nursing is aware.  RN updated on OT treatment findings and recommendations . Pt will need further OT services prior to going home. Pt lives alone and is still employed needing to get back to work. Continue Occupational Therapy services as per plan.    Plan of Care: Will benefit from Occupational Therapy 3 times per week  Discharge Recommendations:  Equipment Front-Wheel Walker and Will Continue to Assess for Equipment Needs. Post-acute therapy recommended before discharged home.    See \"Rehab Therapy-Acute\" Patient Summary Report for complete documentation.   "

## 2017-02-03 NOTE — DISCHARGE PLANNING
Medical Social Work    SW received call from [employer] who reported that she has given auth to [facility]. Alexandria can be reached at [phone].

## 2017-02-03 NOTE — PROGRESS NOTES
"Pharmacy Kinetics 61 y.o. female on vancomycin day # 1 2/3/2017    Currently on Vancomycin 2300 mg IV loading dose followed by 1800 mg iv q24hr (1700)    Indication for Treatment: UTI    Pertinent history per medical record: Admitted on 2017 for femur fracture and ORIF.  Pending discharge, developed possible UTI.  No flank pain.    Other antibiotics: Ceftriaxone 2 g IV q 42 hours    Allergies: Sulfa drugs and Metformin     List concerns for renal function: age, uncontrolled DM (A1C = 8)    Pertinent cultures to date:   Urine 2/3/2017 pending    Recent Labs      17   0318  17   0255   WBC  14.1*  11.4*     No results for input(s): BUN, CREATININE, ALBUMIN in the last 72 hours.  No results for input(s): VANCOTROUGH, VANCOPEAK, VANCORANDOM in the last 72 hours.  Intake/Output Summary (Last 24 hours) at 17 1541  Last data filed at 17 1325   Gross per 24 hour   Intake    150 ml   Output    175 ml   Net    -25 ml      Blood pressure 144/75, pulse 95, temperature 37.6 °C (99.7 °F), resp. rate 16, height 1.6 m (5' 3\"), weight 90.719 kg (200 lb), SpO2 95 %, not currently breastfeeding. Temp (24hrs), Av.1 °C (98.7 °F), Min:36.1 °C (97 °F), Max:37.6 °C (99.7 °F)      A/P   1. Vancomycin dose change: start per protocol  2. Next vancomycin level: will order tomorrow after schedule is established  3. Goal trough: 10-14  4. Comments: I/O data incomplete.  Cultures pending.  Anticipate short duration?    MEGAN Eason, PharmD, BCPS    "

## 2017-02-03 NOTE — DISCHARGE PLANNING
Medical Social Work    MANUELA spoke with Alexandria from [employer]. Alexandria explained that Jo-Ann Gutierrez hired her company has a third party to help provide case management for the pt. Alexandria is hoping to get her moved today to a SNF today, but needs copies of the pt's medical records. MANUELA faxed h&p, therapy notes, procedure note, consults and progress notes to fax number [phone].

## 2017-02-03 NOTE — PROGRESS NOTES
"S:  Seen and examined.   s/p L distal femur ORIF.  Doing well this morning.  No new complaints, pain controlled.     O: Blood pressure 121/61, pulse 85, temperature 37.5 °C (99.5 °F), resp. rate 16, height 1.6 m (5' 3\"), weight 90.719 kg (200 lb), SpO2 94 %, not currently breastfeeding..  No intake or output data in the 24 hours ending 02/02/17 1945.    Operative/injured extremity examined.  Wound c/d/i    Recent Labs      01/31/17   0236  02/01/17   0318  02/02/17   0255   WBC  14.7*  14.1*  11.4*   RBC  3.58*  3.13*  3.33*   HEMOGLOBIN  10.2*  9.2*  9.5*   HEMATOCRIT  31.6*  28.5*  29.6*   MCV  88.3  91.1  88.9   MCH  28.5  29.4  28.5   MCHC  32.3*  32.3*  32.1*   RDW  40.8  43.4  42.1   PLATELETCT  182  205  246   MPV  10.7  10.4  9.9       A/P:    s/p L distal femur ORIF    Antibiotics: Mahogany-op Ancef x2 doses  Activity: TTWB operative extremity.  PT today.  OK to begin knee ROM  Diet: General  DVT: Mechanical (SCDs) + Pharmacologic (Lovenox, OK to d/c home on Aspirin 325 mg PO BID)  Dispo: D/C planning    "

## 2017-02-03 NOTE — PROGRESS NOTES
Hospital Medicine Progress Note, Adult, Complex               Author: Chet Cooper Date & Time created: 2/3/2017  3:04 PM     Interval History:  Admitted for L-femur frac.  She is s/p L-femur ORIF.  Feeling Ok.  No new complaints.    Review of Systems:  Review of Systems   Gastrointestinal: Negative for nausea and abdominal pain.   Genitourinary: Positive for dysuria. Negative for flank pain.   Musculoskeletal: Positive for myalgias and joint pain.   All other systems reviewed and are negative.      Physical Exam:  Physical Exam  Nursing note and vitals reviewed.  Constitutional: She is oriented to person, place, and time. She appears well-developed and well-nourished overweight.   HENT:   Head: Normocephalic and atraumatic.   Right Ear: External ear normal.   Left Ear: External ear normal.   Nose: Nose normal.   Eyes: Conjunctivae and extraocular motions are normal.    Neck: Normal range of motion. Neck supple.   Cardiovascular: Normal rate.    Pulmonary/Chest: Effort normal.   Abdominal: Soft. Bowel sounds are normal.   Musculoskeletal: Normal range of motion.   Neurological: She is alert and oriented to person, place, and time.   Skin: Skin is warm and dry.             Labs:        Invalid input(s): [id number]      No results for input(s): SODIUM, POTASSIUM, CHLORIDE, CO2, BUN, CREATININE, MAGNESIUM, PHOSPHORUS, CALCIUM in the last 72 hours.  No results for input(s): ALTSGPT, ASTSGOT, ALKPHOSPHAT, TBILIRUBIN, DBILIRUBIN, GAMMAGT, AMYLASE, LIPASE, ALB, PREALBUMIN, GLUCOSE in the last 72 hours.  Recent Labs      17   0913  17   0255   RBC  3.13*   --   3.33*   HEMOGLOBIN  9.2*   --   9.5*   HEMATOCRIT  28.5*   --   29.6*   PLATELETCT  205   --   246   IRON   --   30*   --    TOTIRONBC   --   298   --      Recent Labs      17   0255   WBC  14.1*  11.4*           Hemodynamics:  Temp (24hrs), Av.1 °C (98.7 °F), Min:36.1 °C (97 °F), Max:37.6 °C (99.7  °F)  Temperature: 37.6 °C (99.7 °F)  Pulse  Av.7  Min: 51  Max: 98   Blood Pressure: 144/75 mmHg     Respiratory:    Respiration: 16, Pulse Oximetry: 95 %        RUL Breath Sounds: Clear, RML Breath Sounds: Diminished, RLL Breath Sounds: Diminished, RUPERT Breath Sounds: Clear, LLL Breath Sounds: Diminished  Fluids:    Intake/Output Summary (Last 24 hours) at 17 1504  Last data filed at 17 1325   Gross per 24 hour   Intake    150 ml   Output    175 ml   Net    -25 ml        GI/Nutrition:  Orders Placed This Encounter   Procedures   • DIET ORDER     Standing Status: Standing      Number of Occurrences: 1      Standing Expiration Date:      Order Specific Question:  Diet:     Answer:  Diabetic [3]     Order Specific Question:  Calorie modifications:     Answer:  1800 kcals [4]     Medical Decision Making, by Problem:  Active Hospital Problems    Diagnosis   • UTI - start ceftriaxone/vanc.  Follow UCX.   • BMI 35.0-35.9,adult [Z68.35] - encourage Kcal restriction.   • DM (diabetes mellitus), type 2 (CMS-HCC) [E11.9] - uncontrolled.  HbA1c is 8.  Home regimen and cover c ISS.  Not interested in starting metformin.   • COPD (chronic obstructive pulmonary disease) (CMS-HCC) [J44.9] - O2, RT, IS, Vax.   • Fracture of distal femur (CMS-HCC) [S72.409A] - Fosamax, calcium and vit ID.  Ortho on.   HTN - increase Coreg.  SLIVF.  Debility - PT/OT/SNF eval.  Increase activity.  Anemia - Fe replacement.  Stable issues - med hx (tobacco, DLD, recent sepsis), constipation, thrombocytopenia, preventives, HTN.  Dispo - complex/fair.  Await placement.      Medications reviewed and Labs reviewed  Burroughs catheter: No Burroughs      DVT Prophylaxis: Enoxaparin (Lovenox)    Ulcer prophylaxis: Not indicated    Assessed for rehab: Patient was assess for and/or received rehabilitation services during this hospitalization

## 2017-02-03 NOTE — PROGRESS NOTES
Pt resting quietly in bed at this time. Voicing a sense of urgency with voiding as well as burning sensation. Pt voiding small amounts of urine very frequently. Bladder scan obtained showing PVR of 152. MD aware. Will monitor.

## 2017-02-04 LAB
GLUCOSE BLD-MCNC: 136 MG/DL (ref 65–99)
GLUCOSE BLD-MCNC: 165 MG/DL (ref 65–99)
GLUCOSE BLD-MCNC: 180 MG/DL (ref 65–99)
GLUCOSE BLD-MCNC: 201 MG/DL (ref 65–99)

## 2017-02-04 PROCEDURE — A9270 NON-COVERED ITEM OR SERVICE: HCPCS | Performed by: HOSPITALIST

## 2017-02-04 PROCEDURE — 99233 SBSQ HOSP IP/OBS HIGH 50: CPT | Performed by: HOSPITALIST

## 2017-02-04 PROCEDURE — 770006 HCHG ROOM/CARE - MED/SURG/GYN SEMI*

## 2017-02-04 PROCEDURE — 700111 HCHG RX REV CODE 636 W/ 250 OVERRIDE (IP): Performed by: HOSPITALIST

## 2017-02-04 PROCEDURE — 82962 GLUCOSE BLOOD TEST: CPT | Mod: 91

## 2017-02-04 PROCEDURE — 700102 HCHG RX REV CODE 250 W/ 637 OVERRIDE(OP): Performed by: INTERNAL MEDICINE

## 2017-02-04 PROCEDURE — 700111 HCHG RX REV CODE 636 W/ 250 OVERRIDE (IP): Performed by: INTERNAL MEDICINE

## 2017-02-04 PROCEDURE — 700105 HCHG RX REV CODE 258: Performed by: HOSPITALIST

## 2017-02-04 PROCEDURE — 700112 HCHG RX REV CODE 229: Performed by: HOSPITALIST

## 2017-02-04 PROCEDURE — A9270 NON-COVERED ITEM OR SERVICE: HCPCS | Performed by: INTERNAL MEDICINE

## 2017-02-04 PROCEDURE — 700102 HCHG RX REV CODE 250 W/ 637 OVERRIDE(OP): Performed by: HOSPITALIST

## 2017-02-04 RX ORDER — OXYCODONE HYDROCHLORIDE 5 MG/1
5 TABLET ORAL EVERY 6 HOURS PRN
Status: DISCONTINUED | OUTPATIENT
Start: 2017-02-04 | End: 2017-02-06 | Stop reason: HOSPADM

## 2017-02-04 RX ORDER — ISOSORBIDE DINITRATE 10 MG/1
10 TABLET ORAL 3 TIMES DAILY
Status: DISCONTINUED | OUTPATIENT
Start: 2017-02-04 | End: 2017-02-05

## 2017-02-04 RX ORDER — INSULIN GLARGINE 100 [IU]/ML
14 INJECTION, SOLUTION SUBCUTANEOUS EVERY EVENING
Status: DISCONTINUED | OUTPATIENT
Start: 2017-02-04 | End: 2017-02-06 | Stop reason: HOSPADM

## 2017-02-04 RX ORDER — HYDRALAZINE HYDROCHLORIDE 10 MG/1
10 TABLET, FILM COATED ORAL EVERY 8 HOURS
Status: DISCONTINUED | OUTPATIENT
Start: 2017-02-04 | End: 2017-02-06

## 2017-02-04 RX ADMIN — STANDARDIZED SENNA CONCENTRATE AND DOCUSATE SODIUM 1 TABLET: 8.6; 5 TABLET, FILM COATED ORAL at 22:14

## 2017-02-04 RX ADMIN — CALCIUM CARBONATE-CHOLECALCIFEROL TAB 250 MG-125 UNIT 1 TABLET: 250-125 TAB at 09:35

## 2017-02-04 RX ADMIN — CEFTRIAXONE 2 G: 2 INJECTION, POWDER, FOR SOLUTION INTRAMUSCULAR; INTRAVENOUS at 11:46

## 2017-02-04 RX ADMIN — ALENDRONATE SODIUM 10 MG: 10 TABLET ORAL at 06:22

## 2017-02-04 RX ADMIN — HYDRALAZINE HYDROCHLORIDE 10 MG: 10 TABLET, FILM COATED ORAL at 14:16

## 2017-02-04 RX ADMIN — INSULIN LISPRO 2 UNITS: 100 INJECTION, SOLUTION INTRAVENOUS; SUBCUTANEOUS at 16:46

## 2017-02-04 RX ADMIN — ANTACID TABLETS 1000 MG: 500 TABLET, CHEWABLE ORAL at 09:35

## 2017-02-04 RX ADMIN — DOCUSATE SODIUM 100 MG: 100 CAPSULE ORAL at 09:35

## 2017-02-04 RX ADMIN — CARVEDILOL 12.5 MG: 6.25 TABLET, FILM COATED ORAL at 09:35

## 2017-02-04 RX ADMIN — ISOSORBIDE DINITRATE 10 MG: 10 TABLET ORAL at 22:14

## 2017-02-04 RX ADMIN — OXYCODONE HYDROCHLORIDE 5 MG: 5 TABLET ORAL at 23:13

## 2017-02-04 RX ADMIN — CALCIUM CARBONATE-CHOLECALCIFEROL TAB 250 MG-125 UNIT 1 TABLET: 250-125 TAB at 22:13

## 2017-02-04 RX ADMIN — INSULIN LISPRO 3 UNITS: 100 INJECTION, SOLUTION INTRAVENOUS; SUBCUTANEOUS at 11:46

## 2017-02-04 RX ADMIN — Medication 325 MG: at 09:35

## 2017-02-04 RX ADMIN — GABAPENTIN 100 MG: 100 CAPSULE ORAL at 22:13

## 2017-02-04 RX ADMIN — LISINOPRIL 40 MG: 20 TABLET ORAL at 09:35

## 2017-02-04 RX ADMIN — ENOXAPARIN SODIUM 40 MG: 100 INJECTION SUBCUTANEOUS at 22:15

## 2017-02-04 RX ADMIN — INSULIN GLARGINE 14 UNITS: 100 INJECTION, SOLUTION SUBCUTANEOUS at 22:18

## 2017-02-04 RX ADMIN — ISOSORBIDE DINITRATE 10 MG: 10 TABLET ORAL at 14:16

## 2017-02-04 RX ADMIN — INSULIN LISPRO 2 UNITS: 100 INJECTION, SOLUTION INTRAVENOUS; SUBCUTANEOUS at 22:19

## 2017-02-04 RX ADMIN — OXYCODONE HYDROCHLORIDE 5 MG: 5 TABLET ORAL at 09:35

## 2017-02-04 RX ADMIN — HYDRALAZINE HYDROCHLORIDE 10 MG: 10 TABLET, FILM COATED ORAL at 23:13

## 2017-02-04 RX ADMIN — OXYCODONE HYDROCHLORIDE 5 MG: 5 TABLET ORAL at 16:45

## 2017-02-04 RX ADMIN — VITAMIN D, TAB 1000IU (100/BT) 1000 UNITS: 25 TAB at 09:35

## 2017-02-04 RX ADMIN — CARVEDILOL 12.5 MG: 6.25 TABLET, FILM COATED ORAL at 16:45

## 2017-02-04 ASSESSMENT — ENCOUNTER SYMPTOMS
CHILLS: 0
MYALGIAS: 1
NAUSEA: 0
FLANK PAIN: 0
ABDOMINAL PAIN: 0

## 2017-02-04 ASSESSMENT — PAIN SCALES - GENERAL
PAINLEVEL_OUTOF10: ASSUMED PAIN PRESENT
PAINLEVEL_OUTOF10: ASSUMED PAIN PRESENT
PAINLEVEL_OUTOF10: 7

## 2017-02-04 NOTE — PROGRESS NOTES
Hospital Medicine Progress Note, Adult, Complex               Author: Chet Cooper Date & Time created: 2017  3:41 PM     Interval History:  Admitted for L-femur frac.  She is s/p L-femur ORIF.  Sitting up.  Feeling Ok.  No new complaints.  Family at bedside.    Review of Systems:  Review of Systems   Constitutional: Negative for chills.   Gastrointestinal: Negative for nausea and abdominal pain.   Genitourinary: Positive for dysuria. Negative for flank pain.   Musculoskeletal: Positive for myalgias and joint pain.   All other systems reviewed and are negative.      Physical Exam:  Physical Exam  Nursing note and vitals reviewed.  Constitutional: She is oriented to person, place, and time. She appears well-developed and well-nourished overweight.   HENT:   Head: Normocephalic and atraumatic.   Right Ear: External ear normal.   Left Ear: External ear normal.   Nose: Nose normal.   Eyes: Conjunctivae and extraocular motions are normal.    Neck: Normal range of motion. Neck supple.   Pulmonary/Chest: Effort normal.   Abdominal:  Bowel sounds are normal.   Musculoskeletal: Normal range of motion.   Neurological: She is alert and oriented to person, place, and time.   Skin: Skin is warm and dry.             Labs:        Invalid input(s): [id number]      No results for input(s): SODIUM, POTASSIUM, CHLORIDE, CO2, BUN, CREATININE, MAGNESIUM, PHOSPHORUS, CALCIUM in the last 72 hours.  No results for input(s): ALTSGPT, ASTSGOT, ALKPHOSPHAT, TBILIRUBIN, DBILIRUBIN, GAMMAGT, AMYLASE, LIPASE, ALB, PREALBUMIN, GLUCOSE in the last 72 hours.  Recent Labs      17   0255   RBC  3.33*   HEMOGLOBIN  9.5*   HEMATOCRIT  29.6*   PLATELETCT  246     Recent Labs      17   0255   WBC  11.4*           Hemodynamics:  Temp (24hrs), Av.8 °C (98.3 °F), Min:36.3 °C (97.3 °F), Max:37.9 °C (100.3 °F)  Temperature: 36.3 °C (97.4 °F)  Pulse  Av.4  Min: 51  Max: 98   Blood Pressure: 120/62 mmHg     Respiratory:     Respiration: 16, Pulse Oximetry: 95 %           Fluids:  No intake or output data in the 24 hours ending 02/04/17 1541     GI/Nutrition:  Orders Placed This Encounter   Procedures   • DIET ORDER     Standing Status: Standing      Number of Occurrences: 1      Standing Expiration Date:      Order Specific Question:  Diet:     Answer:  Diabetic [3]     Order Specific Question:  Calorie modifications:     Answer:  1800 kcals [4]     Medical Decision Making, by Problem:  Active Hospital Problems    Diagnosis   • UTI - ceftriaxone.  DC vanc.  UCX GNR.   • BMI 35.0-35.9,adult [Z68.35] - encourage Kcal restriction.   • DM (diabetes mellitus), type 2 (CMS-HCC) [E11.9] - uncontrolled.  HbA1c is 8. Increase Lantus.   • COPD (chronic obstructive pulmonary disease) (CMS-HCC) [J44.9] - O2, RT, IS, Vax.   • Fracture of distal femur (CMS-HCC) [S72.409A] - Fosamax, calcium and vit ID.  Decrease PRN oxycodone and DC Flexeril.  Ortho on.   HTN - start hydralazine/isordil.  Debility - PT/OT/SNF eval.  Increase activity.  Anemia/Fe deficient - Fe replacement.  Stable issues - med hx (tobacco, DLD, recent sepsis), constipation, thrombocytopenia, preventives, HTN.  Dispo - complex/fair.  Await placement.      Medications reviewed and Labs reviewed  Burroughs catheter: No Burroughs      DVT Prophylaxis: Enoxaparin (Lovenox)    Ulcer prophylaxis: Not indicated    Assessed for rehab: Patient was assess for and/or received rehabilitation services during this hospitalization

## 2017-02-05 LAB
ALBUMIN SERPL BCP-MCNC: 2.9 G/DL (ref 3.2–4.9)
ALBUMIN/GLOB SERPL: 1 G/DL
ALP SERPL-CCNC: 84 U/L (ref 30–99)
ALT SERPL-CCNC: 17 U/L (ref 2–50)
ANION GAP SERPL CALC-SCNC: 6 MMOL/L (ref 0–11.9)
AST SERPL-CCNC: 23 U/L (ref 12–45)
BACTERIA UR CULT: ABNORMAL
BACTERIA UR CULT: ABNORMAL
BILIRUB SERPL-MCNC: 0.6 MG/DL (ref 0.1–1.5)
BUN SERPL-MCNC: 13 MG/DL (ref 8–22)
CALCIUM SERPL-MCNC: 9.5 MG/DL (ref 8.5–10.5)
CHLORIDE SERPL-SCNC: 102 MMOL/L (ref 96–112)
CO2 SERPL-SCNC: 26 MMOL/L (ref 20–33)
CREAT SERPL-MCNC: 0.65 MG/DL (ref 0.5–1.4)
GFR SERPL CREATININE-BSD FRML MDRD: >60 ML/MIN/1.73 M 2
GLOBULIN SER CALC-MCNC: 2.8 G/DL (ref 1.9–3.5)
GLUCOSE BLD-MCNC: 139 MG/DL (ref 65–99)
GLUCOSE BLD-MCNC: 155 MG/DL (ref 65–99)
GLUCOSE BLD-MCNC: 175 MG/DL (ref 65–99)
GLUCOSE BLD-MCNC: 194 MG/DL (ref 65–99)
GLUCOSE SERPL-MCNC: 202 MG/DL (ref 65–99)
POTASSIUM SERPL-SCNC: 4.1 MMOL/L (ref 3.6–5.5)
PROT SERPL-MCNC: 5.7 G/DL (ref 6–8.2)
SIGNIFICANT IND 70042: ABNORMAL
SITE SITE: ABNORMAL
SODIUM SERPL-SCNC: 134 MMOL/L (ref 135–145)
SOURCE SOURCE: ABNORMAL

## 2017-02-05 PROCEDURE — 700102 HCHG RX REV CODE 250 W/ 637 OVERRIDE(OP): Performed by: HOSPITALIST

## 2017-02-05 PROCEDURE — 700112 HCHG RX REV CODE 229: Performed by: HOSPITALIST

## 2017-02-05 PROCEDURE — 99233 SBSQ HOSP IP/OBS HIGH 50: CPT | Performed by: HOSPITALIST

## 2017-02-05 PROCEDURE — 700102 HCHG RX REV CODE 250 W/ 637 OVERRIDE(OP): Performed by: INTERNAL MEDICINE

## 2017-02-05 PROCEDURE — A9270 NON-COVERED ITEM OR SERVICE: HCPCS | Performed by: HOSPITALIST

## 2017-02-05 PROCEDURE — 82962 GLUCOSE BLOOD TEST: CPT | Mod: 91

## 2017-02-05 PROCEDURE — A9270 NON-COVERED ITEM OR SERVICE: HCPCS | Performed by: INTERNAL MEDICINE

## 2017-02-05 PROCEDURE — 700105 HCHG RX REV CODE 258: Performed by: HOSPITALIST

## 2017-02-05 PROCEDURE — 700111 HCHG RX REV CODE 636 W/ 250 OVERRIDE (IP): Performed by: INTERNAL MEDICINE

## 2017-02-05 PROCEDURE — 36415 COLL VENOUS BLD VENIPUNCTURE: CPT

## 2017-02-05 PROCEDURE — 700111 HCHG RX REV CODE 636 W/ 250 OVERRIDE (IP): Performed by: HOSPITALIST

## 2017-02-05 PROCEDURE — 770006 HCHG ROOM/CARE - MED/SURG/GYN SEMI*

## 2017-02-05 PROCEDURE — 80053 COMPREHEN METABOLIC PANEL: CPT

## 2017-02-05 RX ORDER — CIPROFLOXACIN 2 MG/ML
400 INJECTION, SOLUTION INTRAVENOUS EVERY 12 HOURS
Status: DISCONTINUED | OUTPATIENT
Start: 2017-02-05 | End: 2017-02-05

## 2017-02-05 RX ORDER — CIPROFLOXACIN 500 MG/1
500 TABLET, FILM COATED ORAL EVERY 12 HOURS
Status: DISCONTINUED | OUTPATIENT
Start: 2017-02-05 | End: 2017-02-06 | Stop reason: HOSPADM

## 2017-02-05 RX ORDER — ACETAMINOPHEN 325 MG/1
650 TABLET ORAL EVERY 6 HOURS
Status: DISCONTINUED | OUTPATIENT
Start: 2017-02-05 | End: 2017-02-06 | Stop reason: HOSPADM

## 2017-02-05 RX ORDER — ISOSORBIDE DINITRATE 20 MG/1
20 TABLET ORAL 3 TIMES DAILY
Status: DISCONTINUED | OUTPATIENT
Start: 2017-02-05 | End: 2017-02-06 | Stop reason: HOSPADM

## 2017-02-05 RX ADMIN — INSULIN LISPRO 2 UNITS: 100 INJECTION, SOLUTION INTRAVENOUS; SUBCUTANEOUS at 21:10

## 2017-02-05 RX ADMIN — OXYCODONE HYDROCHLORIDE 5 MG: 5 TABLET ORAL at 21:12

## 2017-02-05 RX ADMIN — OXYCODONE HYDROCHLORIDE 5 MG: 5 TABLET ORAL at 15:47

## 2017-02-05 RX ADMIN — ISOSORBIDE DINITRATE 20 MG: 10 TABLET ORAL at 15:38

## 2017-02-05 RX ADMIN — INSULIN LISPRO 2 UNITS: 100 INJECTION, SOLUTION INTRAVENOUS; SUBCUTANEOUS at 11:31

## 2017-02-05 RX ADMIN — ISOSORBIDE DINITRATE 20 MG: 10 TABLET ORAL at 12:02

## 2017-02-05 RX ADMIN — HYDRALAZINE HYDROCHLORIDE 10 MG: 10 TABLET, FILM COATED ORAL at 06:41

## 2017-02-05 RX ADMIN — HYDRALAZINE HYDROCHLORIDE 10 MG: 10 TABLET, FILM COATED ORAL at 23:09

## 2017-02-05 RX ADMIN — CARVEDILOL 12.5 MG: 6.25 TABLET, FILM COATED ORAL at 16:34

## 2017-02-05 RX ADMIN — ISOSORBIDE DINITRATE 20 MG: 10 TABLET ORAL at 21:12

## 2017-02-05 RX ADMIN — ACETAMINOPHEN 650 MG: 325 TABLET, FILM COATED ORAL at 23:09

## 2017-02-05 RX ADMIN — ALENDRONATE SODIUM 10 MG: 10 TABLET ORAL at 05:57

## 2017-02-05 RX ADMIN — CALCIUM CARBONATE-CHOLECALCIFEROL TAB 250 MG-125 UNIT 1 TABLET: 250-125 TAB at 10:27

## 2017-02-05 RX ADMIN — CEFTRIAXONE 2 G: 2 INJECTION, POWDER, FOR SOLUTION INTRAMUSCULAR; INTRAVENOUS at 10:27

## 2017-02-05 RX ADMIN — ENOXAPARIN SODIUM 40 MG: 100 INJECTION SUBCUTANEOUS at 21:11

## 2017-02-05 RX ADMIN — CIPROFLOXACIN HYDROCHLORIDE 500 MG: 500 TABLET, FILM COATED ORAL at 21:11

## 2017-02-05 RX ADMIN — Medication 325 MG: at 10:27

## 2017-02-05 RX ADMIN — ACETAMINOPHEN 650 MG: 325 TABLET, FILM COATED ORAL at 11:32

## 2017-02-05 RX ADMIN — OXYCODONE HYDROCHLORIDE 5 MG: 5 TABLET ORAL at 06:41

## 2017-02-05 RX ADMIN — LISINOPRIL 40 MG: 20 TABLET ORAL at 10:27

## 2017-02-05 RX ADMIN — CARVEDILOL 12.5 MG: 6.25 TABLET, FILM COATED ORAL at 12:03

## 2017-02-05 RX ADMIN — HYDRALAZINE HYDROCHLORIDE 10 MG: 10 TABLET, FILM COATED ORAL at 15:38

## 2017-02-05 RX ADMIN — CALCIUM CARBONATE-CHOLECALCIFEROL TAB 250 MG-125 UNIT 1 TABLET: 250-125 TAB at 21:12

## 2017-02-05 RX ADMIN — ANTACID TABLETS 1000 MG: 500 TABLET, CHEWABLE ORAL at 10:27

## 2017-02-05 RX ADMIN — DOCUSATE SODIUM 100 MG: 100 CAPSULE ORAL at 10:27

## 2017-02-05 RX ADMIN — INSULIN LISPRO 2 UNITS: 100 INJECTION, SOLUTION INTRAVENOUS; SUBCUTANEOUS at 16:29

## 2017-02-05 RX ADMIN — INSULIN GLARGINE 14 UNITS: 100 INJECTION, SOLUTION SUBCUTANEOUS at 21:09

## 2017-02-05 RX ADMIN — ACETAMINOPHEN 650 MG: 325 TABLET, FILM COATED ORAL at 18:00

## 2017-02-05 RX ADMIN — VITAMIN D, TAB 1000IU (100/BT) 1000 UNITS: 25 TAB at 10:27

## 2017-02-05 RX ADMIN — STANDARDIZED SENNA CONCENTRATE AND DOCUSATE SODIUM 1 TABLET: 8.6; 5 TABLET, FILM COATED ORAL at 21:11

## 2017-02-05 RX ADMIN — GABAPENTIN 100 MG: 100 CAPSULE ORAL at 21:12

## 2017-02-05 ASSESSMENT — ENCOUNTER SYMPTOMS
ABDOMINAL PAIN: 0
NAUSEA: 0
MYALGIAS: 1
FLANK PAIN: 0

## 2017-02-05 ASSESSMENT — PAIN SCALES - GENERAL
PAINLEVEL_OUTOF10: 7
PAINLEVEL_OUTOF10: 7

## 2017-02-05 NOTE — PROGRESS NOTES
Hospital Medicine Progress Note, Adult, Complex               Author: Chet Cooper Date & Time created: 2017  2:28 PM     Interval History:  Admitted for L-femur frac.  She is s/p L-femur ORIF.  Sitting up.  Feeling Ok.  No new complaints.      Review of Systems:  Review of Systems   Gastrointestinal: Negative for nausea and abdominal pain.   Genitourinary: Negative for dysuria and flank pain.   Musculoskeletal: Positive for myalgias and joint pain.   All other systems reviewed and are negative.      Physical Exam:  Physical Exam  Nursing note and vitals reviewed.  Constitutional: She is oriented to person, place, and time. She appears well-developed and well-nourished overweight.   HENT:   Head: Normocephalic and atraumatic.   Right Ear: External ear normal.   Left Ear: External ear normal.   Nose: Nose normal.   Eyes: Conjunctivae and extraocular motions are normal.    Neck: Normal range of motion. Neck supple.   Pulmonary/Chest: Effort normal.   Musculoskeletal: Normal range of motion.   Neurological: She is alert and oriented to person, place, and time.   Skin: Skin is warm and dry.  No rash.            Labs:        Invalid input(s): [id number]      No results for input(s): SODIUM, POTASSIUM, CHLORIDE, CO2, BUN, CREATININE, MAGNESIUM, PHOSPHORUS, CALCIUM in the last 72 hours.  No results for input(s): ALTSGPT, ASTSGOT, ALKPHOSPHAT, TBILIRUBIN, DBILIRUBIN, GAMMAGT, AMYLASE, LIPASE, ALB, PREALBUMIN, GLUCOSE in the last 72 hours.  No results for input(s): RBC, HEMOGLOBIN, HEMATOCRIT, PLATELETCT, PROTHROMBTM, APTT, INR, IRON, FERRITIN, TOTIRONBC in the last 72 hours.            Hemodynamics:  Temp (24hrs), Av.8 °C (98.2 °F), Min:36.4 °C (97.5 °F), Max:37.6 °C (99.6 °F)  Temperature: 36.7 °C (98.1 °F)  Pulse  Av.5  Min: 51  Max: 98   Blood Pressure: 145/76 mmHg     Respiratory:    Respiration: 16, Pulse Oximetry: 94 %           Fluids:  No intake or output data in the 24 hours ending 17 6410      GI/Nutrition:  Orders Placed This Encounter   Procedures   • DIET ORDER     Standing Status: Standing      Number of Occurrences: 1      Standing Expiration Date:      Order Specific Question:  Diet:     Answer:  Diabetic [3]     Order Specific Question:  Calorie modifications:     Answer:  1800 kcals [4]     Medical Decision Making, by Problem:  Active Hospital Problems    Diagnosis   • UTI - UCX grew E aerogenes.  Change Abx to cipro.   • BMI 35.0-35.9,adult [Z68.35] - encourage Kcal restriction.   • DM (diabetes mellitus), type 2 (CMS-HCC) [E11.9] - better profile.  HbA1c is 8.   • COPD (chronic obstructive pulmonary disease) (CMS-HCC) [J44.9] - O2, RT, IS, Vax.   • Fracture of distal femur (CMS-HCC) [S72.409A] - Fosamax, calcium and vit ID.  Change Tylenol to sched'd.  Ortho on.   HTN - increase Isordil.  Debility - PT/OT/SNF eval.  Increase activity.  Anemia/Fe deficient - Fe replacement.  Stable issues - med hx (tobacco, DLD, recent sepsis), constipation, thrombocytopenia, preventives, HTN.  Dispo - complex/fair.  Await placement.      Medications reviewed and Labs reviewed  Burroughs catheter: No Burroughs      DVT Prophylaxis: Enoxaparin (Lovenox)    Ulcer prophylaxis: Not indicated    Assessed for rehab: Patient was assess for and/or received rehabilitation services during this hospitalization

## 2017-02-05 NOTE — CARE PLAN
Problem: Infection  Goal: Will remain free from infection  Outcome: NOT MET  Pt with newly developed UTI, voicing burning sensation with voiding and frequent, urgent need to void. MD aware and antibiotics on board.

## 2017-02-05 NOTE — CARE PLAN
Problem: Mobility  Goal: Risk for activity intolerance will decrease  Intervention: Assess and monitor signs of activity intolerance  Pt up to chair for meals.

## 2017-02-06 VITALS
RESPIRATION RATE: 17 BRPM | OXYGEN SATURATION: 96 % | HEIGHT: 63 IN | WEIGHT: 200 LBS | TEMPERATURE: 97.1 F | HEART RATE: 84 BPM | DIASTOLIC BLOOD PRESSURE: 90 MMHG | BODY MASS INDEX: 35.44 KG/M2 | SYSTOLIC BLOOD PRESSURE: 131 MMHG

## 2017-02-06 LAB
GLUCOSE BLD-MCNC: 136 MG/DL (ref 65–99)
GLUCOSE BLD-MCNC: 226 MG/DL (ref 65–99)

## 2017-02-06 PROCEDURE — A9270 NON-COVERED ITEM OR SERVICE: HCPCS | Performed by: INTERNAL MEDICINE

## 2017-02-06 PROCEDURE — 700102 HCHG RX REV CODE 250 W/ 637 OVERRIDE(OP): Performed by: INTERNAL MEDICINE

## 2017-02-06 PROCEDURE — 82962 GLUCOSE BLOOD TEST: CPT

## 2017-02-06 PROCEDURE — 700102 HCHG RX REV CODE 250 W/ 637 OVERRIDE(OP): Performed by: HOSPITALIST

## 2017-02-06 PROCEDURE — A9270 NON-COVERED ITEM OR SERVICE: HCPCS | Performed by: HOSPITALIST

## 2017-02-06 PROCEDURE — 99239 HOSP IP/OBS DSCHRG MGMT >30: CPT | Performed by: HOSPITALIST

## 2017-02-06 PROCEDURE — 700112 HCHG RX REV CODE 229: Performed by: HOSPITALIST

## 2017-02-06 PROCEDURE — 97535 SELF CARE MNGMENT TRAINING: CPT

## 2017-02-06 RX ORDER — CIPROFLOXACIN 500 MG/1
500 TABLET, FILM COATED ORAL EVERY 12 HOURS
Qty: 8 TAB | Refills: 0 | Status: SHIPPED | DISCHARGE
Start: 2017-02-06 | End: 2017-02-10

## 2017-02-06 RX ORDER — CARVEDILOL 12.5 MG/1
12.5 TABLET ORAL 2 TIMES DAILY WITH MEALS
Qty: 60 TAB | Status: SHIPPED | DISCHARGE
Start: 2017-02-06 | End: 2022-05-31

## 2017-02-06 RX ORDER — HYDRALAZINE HYDROCHLORIDE 25 MG/1
25 TABLET, FILM COATED ORAL EVERY 8 HOURS
Qty: 90 TAB | Status: SHIPPED | DISCHARGE
Start: 2017-02-06 | End: 2022-05-31

## 2017-02-06 RX ORDER — ISOSORBIDE DINITRATE 20 MG/1
20 TABLET ORAL 3 TIMES DAILY
Qty: 90 TAB | Status: SHIPPED | DISCHARGE
Start: 2017-02-06 | End: 2022-05-31

## 2017-02-06 RX ORDER — OXYCODONE HYDROCHLORIDE 5 MG/1
5 TABLET ORAL EVERY 8 HOURS PRN
Status: SHIPPED | DISCHARGE
Start: 2017-02-06 | End: 2022-05-31

## 2017-02-06 RX ORDER — ASPIRIN 325 MG
325 TABLET ORAL DAILY
Status: SHIPPED | DISCHARGE
Start: 2017-02-06 | End: 2022-05-31

## 2017-02-06 RX ORDER — HYDRALAZINE HYDROCHLORIDE 50 MG/1
25 TABLET, FILM COATED ORAL EVERY 8 HOURS
Status: DISCONTINUED | OUTPATIENT
Start: 2017-02-06 | End: 2017-02-06 | Stop reason: HOSPADM

## 2017-02-06 RX ORDER — INSULIN GLARGINE 100 [IU]/ML
14 INJECTION, SOLUTION SUBCUTANEOUS EVERY EVENING
Qty: 10 ML | Status: SHIPPED | DISCHARGE
Start: 2017-02-06 | End: 2022-05-31

## 2017-02-06 RX ADMIN — HYDRALAZINE HYDROCHLORIDE 25 MG: 50 TABLET ORAL at 13:19

## 2017-02-06 RX ADMIN — DOCUSATE SODIUM 100 MG: 100 CAPSULE ORAL at 08:49

## 2017-02-06 RX ADMIN — ACETAMINOPHEN 650 MG: 325 TABLET, FILM COATED ORAL at 13:19

## 2017-02-06 RX ADMIN — ISOSORBIDE DINITRATE 20 MG: 10 TABLET ORAL at 08:53

## 2017-02-06 RX ADMIN — CALCIUM CARBONATE-CHOLECALCIFEROL TAB 250 MG-125 UNIT 1 TABLET: 250-125 TAB at 08:49

## 2017-02-06 RX ADMIN — ACETAMINOPHEN 650 MG: 325 TABLET, FILM COATED ORAL at 06:09

## 2017-02-06 RX ADMIN — CIPROFLOXACIN HYDROCHLORIDE 500 MG: 500 TABLET, FILM COATED ORAL at 08:48

## 2017-02-06 RX ADMIN — LISINOPRIL 40 MG: 20 TABLET ORAL at 08:49

## 2017-02-06 RX ADMIN — OXYCODONE HYDROCHLORIDE 5 MG: 5 TABLET ORAL at 06:09

## 2017-02-06 RX ADMIN — ANTACID TABLETS 1000 MG: 500 TABLET, CHEWABLE ORAL at 08:49

## 2017-02-06 RX ADMIN — CARVEDILOL 12.5 MG: 6.25 TABLET, FILM COATED ORAL at 08:49

## 2017-02-06 RX ADMIN — ALENDRONATE SODIUM 10 MG: 10 TABLET ORAL at 06:09

## 2017-02-06 RX ADMIN — Medication 325 MG: at 08:49

## 2017-02-06 RX ADMIN — INSULIN LISPRO 3 UNITS: 100 INJECTION, SOLUTION INTRAVENOUS; SUBCUTANEOUS at 11:38

## 2017-02-06 RX ADMIN — HYDRALAZINE HYDROCHLORIDE 10 MG: 10 TABLET, FILM COATED ORAL at 06:09

## 2017-02-06 RX ADMIN — VITAMIN D, TAB 1000IU (100/BT) 1000 UNITS: 25 TAB at 08:49

## 2017-02-06 ASSESSMENT — PAIN SCALES - GENERAL
PAINLEVEL_OUTOF10: 5
PAINLEVEL_OUTOF10: 6
PAINLEVEL_OUTOF10: 6
PAINLEVEL_OUTOF10: 4

## 2017-02-06 NOTE — DISCHARGE SUMMARY
Since the transfer summary was dictated on 1/31/2017 patient remained in the hospital awaiting insurance/SNF disposition, remaining hemodynamically stable.  She is to continue with the medication and follow up as outlined in the previous summary and updated below.    Total DC time process 43 min.        MEDICATIONS ON DISCHARGE   Miley Whittington   Home Medication Instructions LORI:[id number]    Printed on:02/06/17 0199   Medication Information                      acetaminophen (TYLENOL) 325 MG Tab  Take 2 Tabs by mouth every 6 hours as needed for Fever or Moderate Pain (Temp >101.5F).             albuterol 108 (90 BASE) MCG/ACT Aero Soln inhalation aerosol  Inhale 2 Puffs by mouth every 6 hours as needed for Shortness of Breath.             alendronate (FOSAMAX) 10 MG Tab  Take 1 Tab by mouth every morning before breakfast.             aspirin (ASA) 325 MG Tab  Take 1 Tab by mouth every day.             Calcium Carb-Cholecalciferol (OYSTER SHELL CALCIUM/VITAMIN D) 250-125 MG-UNIT Tab tablet  Take 1 Tab by mouth 2 Times a Day.             carvedilol (COREG) 12.5 MG Tab  Take 1 Tab by mouth 2 times a day, with meals.             ciprofloxacin (CIPRO) 500 MG Tab  Take 1 Tab by mouth every 12 hours for 4 days.             ferrous sulfate 325 (65 FE) MG tablet  Take 1 Tab by mouth every morning with breakfast.             gabapentin (NEURONTIN) 100 MG Cap  Take 100 mg by mouth every bedtime.             hydrALAZINE (APRESOLINE) 25 MG Tab  Take 1 Tab by mouth every 8 hours.             insulin glargine (LANTUS) 100 UNIT/ML Solution  Inject 14 Units as instructed every evening.             insulin lispro (HUMALOG) 100 UNIT/ML Solution  Inject 2-9 Units as instructed 4 Times a Day,Before Meals and at Bedtime.             isosorbide dinitrate (ISORDIL) 20 MG Tab  Take 1 Tab by mouth 3 times a day.             lisinopril (PRINIVIL, ZESTRIL) 40 MG tablet  Take 1 Tab by mouth every day.             oxycodone immediate-release  (ROXICODONE) 5 MG Tab  Take 1 Tab by mouth every 8 hours as needed for Severe Pain.             tiotropium (SPIRIVA) 18 MCG Cap  Inhale 18 mcg by mouth every day.             vitamin D (VITAMIND D3) 1000 UNIT Tab  Take 1 Tab by mouth every day.

## 2017-02-06 NOTE — DISCHARGE PLANNING
[place] [facility] [place] [facility]  today @ 2:00 to be transported to [facility]. MANUELA Adams notified.

## 2017-02-06 NOTE — PROGRESS NOTES
Discharge orders acknowledged. Pt aware and compliant with new orders, Report given to nurse Alverto, PIV removed.  Pain to LLE toleratable 3/10. Pt A+Ox4 able to make needs known.

## 2017-02-06 NOTE — CARE PLAN
Problem: Discharge Barriers/Planning  Goal: Patient’s continuum of care needs will be met  Outcome: PROGRESSING AS EXPECTED  Discharge orders acknowledged. Pt aware and compliant with new orders, Report given to nurse Alverto, PIV removed.  Pain to LLE toleratable 3/10. Pt A+Ox4 able to make needs known.

## 2017-02-06 NOTE — PROGRESS NOTES
"S:  Seen and examined.  POD #9 s/p L distal femur ORIF.  Doing well this morning.  No new complaints, pain controlled.    O: Blood pressure 148/79, pulse 84, temperature 36.2 °C (97.1 °F), resp. rate 17, height 1.6 m (5' 3\"), weight 90.719 kg (200 lb), SpO2 96 %, not currently breastfeeding..  No intake or output data in the 24 hours ending 02/06/17 1214.    Operative/injured extremity examined.  Compartments soft, distal light touch sensation intact, firing EHL/TA/GS/P.  Toes warm, well-perfused.  Wound c/d/i          A/P:    POD #9 s/p L distal femur ORIF    Antibiotics: None  Activity: TTWB operative extremity.  PT today.  Knee ROM.  Switch to hinged brace today prior to d/c.  Diet: General  DVT: Mechanical (SCDs) + Pharmacologic (Lovenox, d/c to SNF on Aspirin 325 BID)  Dispo: D/C planning.  Follow-up at [facility] on Friday for staple removal.    "

## 2017-02-06 NOTE — PROGRESS NOTES
Assumed care from night shift, Pt A+Ox4 able to make needs known, Pain to RLE 5/10, declining pain medication at this time. SCD's and pulse ox in place. Ice pack applied, LLE elevated for comfort knee immobilizer in place. Pt tolerating solids and fluids well. Discussed goals for the day.   Pt compliant with assessment and education.

## 2017-02-06 NOTE — DISCHARGE PLANNING
Medical Social Work    Discharge Plan: Pt is medically cleared to transfer to SNF today. Pt has been accepted to [facility]. Transport arranged for 1400 via [facility]. MANUELA met with pt at bedside. Pt agreeable to transfer and signed cobra. SW notified the nurse of the transport.    Care Transition Team Final Discharge Disposition    Actual Discharge Information  Actual Discharge Date: 02/06/17  Care Transitions Team Assiting with Transportation: Yes  Method of Transportation: Van  Scheduled Transportation Date: 02/06/17  Scheduled Transportation Time: 1400  Actual Disposition: D/T to SNF with Medicare cert in anticipation of skilled care (03)

## 2017-02-06 NOTE — DISCHARGE PLANNING
Medical Social Work    SW received two VM's in the span of minutes apart in addition to a VM from a CCS who reported that Alexandria from [facility] had concerns regarding the pt not discharging this past Friday.     This SW had attempted to call [facility] multiple times on Friday, but never heard back from the facility. MANUELA was notified of verified insurance auth by [facility] this morning (Monday). Transport to [facility] was arrange for pt to transfer to SNF.    MANUELA called [facility] at [phone]. Representative reported that [facility] had only just verified insurance auth on Friday evening and it would have been too late to arrange for a [facility] to transport the pt. Also, they do not like to accept pt's over the weekend.     MANUELA spoke with Alexandria from [facility] at [phone].

## 2017-02-06 NOTE — DISCHARGE PLANNING
Spoke to Cady at [facility], referral has been accepted and they have a bed available for pt today. MANUELA Adams notified.

## 2017-02-06 NOTE — DISCHARGE PLANNING
Medical Social Work    SW attempted to call [facility] at [phone]. No answer, left message.

## 2017-02-06 NOTE — THERAPY
"Occupational Therapy Treatment completed with focus on ADLs, ADL transfers and patient education.  Functional Status:  Pt demos the following ADL's this am, toileting- Supervision, Toilet transfers- SBA, Grooming standing- SBA, Mobility from Chair<> Bathroom using FWW- Close supervision using the hopping technique to avoid WB in LLE. Pt EDU on AE and issued a AE purchasing handout. Pt stated she will have a tub transfer bench at her family members house who sh plans to stay with. Pt verbalized understanding.  Pt will continue to benefit from OT services to maximize independence in ADL's and functional transfers.   Plan of Care: Will benefit from Occupational Therapy 3 times per week  Discharge Recommendations:  Equipment Front-Wheel Walker and Bedside Commode. Post-acute therapy recommended after discharged home.    See \"Rehab Therapy-Acute\" Patient Summary Report for complete documentation.   "

## 2017-02-06 NOTE — CARE PLAN
Problem: Safety  Goal: Will remain free from injury  Outcome: PROGRESSING AS EXPECTED  Pt remains free from injury, Walker and commode in bathroom, floor clean from clutter and wires.  Pt A+Ox4 able to make needs known, Uses call light appropriately when needing assistance. .     Problem: Pain Management  Goal: Pain level will decrease to patient’s comfort goal  Outcome: PROGRESSING AS EXPECTED  Pain remains controled with scheduled and PRN pain medication, Encouraged repositioning elevation and ice pack as needed.     Problem: Respiratory:  Goal: Respiratory status will improve  Outcome: PROGRESSING AS EXPECTED  Incentive Spirometer education completed, pt compliant with using 10x a hour (1200ml)

## 2017-02-07 ENCOUNTER — PATIENT OUTREACH (OUTPATIENT)
Dept: HEALTH INFORMATION MANAGEMENT | Facility: OTHER | Age: 62
End: 2017-02-07

## 2019-01-25 ENCOUNTER — NON-PROVIDER VISIT (OUTPATIENT)
Dept: URGENT CARE | Facility: PHYSICIAN GROUP | Age: 64
End: 2019-01-25

## 2019-01-25 DIAGNOSIS — Z02.1 PRE-EMPLOYMENT DRUG SCREENING: ICD-10-CM

## 2019-01-25 LAB
BREATH ALCOHOL COMMENT: NORMAL
POC BREATHALIZER: 0 PERCENT (ref 0–0.01)

## 2019-01-25 PROCEDURE — 8279 PR URINE 6 PANEL - SEND TO LAB: Performed by: NURSE PRACTITIONER

## 2019-01-25 PROCEDURE — 82075 ASSAY OF BREATH ETHANOL: CPT | Performed by: NURSE PRACTITIONER

## 2022-05-31 ENCOUNTER — PRE-ADMISSION TESTING (OUTPATIENT)
Dept: ADMISSIONS | Facility: MEDICAL CENTER | Age: 67
End: 2022-05-31
Attending: OPHTHALMOLOGY
Payer: COMMERCIAL

## 2022-05-31 DIAGNOSIS — Z01.812 PRE-OPERATIVE LABORATORY EXAMINATION: ICD-10-CM

## 2022-05-31 DIAGNOSIS — Z01.810 PRE-OPERATIVE CARDIOVASCULAR EXAMINATION: ICD-10-CM

## 2022-05-31 LAB
ANION GAP SERPL CALC-SCNC: 7 MMOL/L (ref 7–16)
BUN SERPL-MCNC: 13 MG/DL (ref 8–22)
CALCIUM SERPL-MCNC: 9.3 MG/DL (ref 8.5–10.5)
CHLORIDE SERPL-SCNC: 110 MMOL/L (ref 96–112)
CO2 SERPL-SCNC: 23 MMOL/L (ref 20–33)
CREAT SERPL-MCNC: 0.72 MG/DL (ref 0.5–1.4)
EKG IMPRESSION: NORMAL
EST. AVERAGE GLUCOSE BLD GHB EST-MCNC: 229 MG/DL
GFR SERPLBLD CREATININE-BSD FMLA CKD-EPI: 90 ML/MIN/1.73 M 2
GLUCOSE SERPL-MCNC: 110 MG/DL (ref 65–99)
HBA1C MFR BLD: 9.6 % (ref 4–5.6)
POTASSIUM SERPL-SCNC: 5.4 MMOL/L (ref 3.6–5.5)
SODIUM SERPL-SCNC: 140 MMOL/L (ref 135–145)

## 2022-05-31 PROCEDURE — 83036 HEMOGLOBIN GLYCOSYLATED A1C: CPT

## 2022-05-31 PROCEDURE — 36415 COLL VENOUS BLD VENIPUNCTURE: CPT

## 2022-05-31 PROCEDURE — 80048 BASIC METABOLIC PNL TOTAL CA: CPT

## 2022-05-31 PROCEDURE — 93005 ELECTROCARDIOGRAM TRACING: CPT

## 2022-05-31 PROCEDURE — 93010 ELECTROCARDIOGRAM REPORT: CPT | Performed by: INTERNAL MEDICINE

## 2022-05-31 RX ORDER — ACETAMINOPHEN 325 MG/1
325-650 TABLET ORAL PRN
COMMUNITY

## 2022-05-31 RX ORDER — NAPROXEN 500 MG/1
500 TABLET ORAL NIGHTLY
COMMUNITY

## 2022-05-31 RX ORDER — SPIRONOLACTONE 25 MG/1
25 TABLET ORAL EVERY MORNING
COMMUNITY

## 2022-05-31 RX ORDER — AMLODIPINE BESYLATE 10 MG/1
10 TABLET ORAL NIGHTLY
COMMUNITY

## 2022-05-31 RX ORDER — GLIMEPIRIDE 2 MG/1
2 TABLET ORAL EVERY MORNING
COMMUNITY
End: 2022-11-14

## 2022-05-31 RX ORDER — TIOTROPIUM BROMIDE INHALATION SPRAY 3.12 UG/1
5 SPRAY, METERED RESPIRATORY (INHALATION)
COMMUNITY

## 2022-05-31 RX ORDER — LATANOPROST 50 UG/ML
1 SOLUTION/ DROPS OPHTHALMIC NIGHTLY
COMMUNITY

## 2022-05-31 RX ORDER — METOPROLOL TARTRATE 50 MG/1
50 TABLET, FILM COATED ORAL 2 TIMES DAILY
COMMUNITY

## 2022-05-31 RX ORDER — ERGOCALCIFEROL 1.25 MG/1
CAPSULE ORAL
COMMUNITY

## 2022-05-31 RX ORDER — LISINOPRIL 5 MG/1
20 TABLET ORAL DAILY
Status: ON HOLD | COMMUNITY
End: 2023-10-18

## 2022-05-31 RX ORDER — ATORVASTATIN CALCIUM 40 MG/1
40 TABLET, FILM COATED ORAL NIGHTLY
COMMUNITY

## 2022-05-31 RX ORDER — OMEPRAZOLE 20 MG/1
20 CAPSULE, DELAYED RELEASE ORAL
COMMUNITY

## 2022-05-31 RX ORDER — CARBOXYMETHYLCELLULOSE SODIUM 5 MG/ML
1 SOLUTION/ DROPS OPHTHALMIC PRN
COMMUNITY

## 2022-05-31 RX ORDER — INSULIN LISPRO 100 [IU]/ML
INJECTION, SOLUTION INTRAVENOUS; SUBCUTANEOUS 2 TIMES DAILY WITH MEALS
Status: ON HOLD | COMMUNITY
End: 2023-10-18

## 2022-05-31 NOTE — PREPROCEDURE INSTRUCTIONS
Pre Admit appointment completed. Instructed patient to contact PCP and Dr. Banks's office for diabetic/insulin instructions. Daughter and patient verbalized understanding.

## 2022-06-08 ENCOUNTER — ANESTHESIA EVENT (OUTPATIENT)
Dept: SURGERY | Facility: MEDICAL CENTER | Age: 67
End: 2022-06-08
Payer: COMMERCIAL

## 2022-06-08 ENCOUNTER — HOSPITAL ENCOUNTER (OUTPATIENT)
Facility: MEDICAL CENTER | Age: 67
End: 2022-06-08
Attending: OPHTHALMOLOGY | Admitting: OPHTHALMOLOGY
Payer: COMMERCIAL

## 2022-06-08 ENCOUNTER — ANESTHESIA (OUTPATIENT)
Dept: SURGERY | Facility: MEDICAL CENTER | Age: 67
End: 2022-06-08
Payer: COMMERCIAL

## 2022-06-08 VITALS
TEMPERATURE: 97.1 F | DIASTOLIC BLOOD PRESSURE: 88 MMHG | BODY MASS INDEX: 27.73 KG/M2 | OXYGEN SATURATION: 91 % | SYSTOLIC BLOOD PRESSURE: 167 MMHG | WEIGHT: 156.53 LBS | HEART RATE: 79 BPM | HEIGHT: 63 IN | RESPIRATION RATE: 20 BRPM

## 2022-06-08 LAB
GLUCOSE BLD STRIP.AUTO-MCNC: 161 MG/DL (ref 65–99)
POTASSIUM SERPL-SCNC: 4 MMOL/L (ref 3.6–5.5)

## 2022-06-08 PROCEDURE — 160002 HCHG RECOVERY MINUTES (STAT): Performed by: OPHTHALMOLOGY

## 2022-06-08 PROCEDURE — 160029 HCHG SURGERY MINUTES - 1ST 30 MINS LEVEL 4: Performed by: OPHTHALMOLOGY

## 2022-06-08 PROCEDURE — 700101 HCHG RX REV CODE 250: Performed by: OPHTHALMOLOGY

## 2022-06-08 PROCEDURE — 00145 ANES PX EYE VITREORTNL SURG: CPT | Performed by: ANESTHESIOLOGY

## 2022-06-08 PROCEDURE — 160046 HCHG PACU - 1ST 60 MINS PHASE II: Performed by: OPHTHALMOLOGY

## 2022-06-08 PROCEDURE — 700111 HCHG RX REV CODE 636 W/ 250 OVERRIDE (IP): Performed by: OPHTHALMOLOGY

## 2022-06-08 PROCEDURE — 700111 HCHG RX REV CODE 636 W/ 250 OVERRIDE (IP): Performed by: ANESTHESIOLOGY

## 2022-06-08 PROCEDURE — 700101 HCHG RX REV CODE 250: Performed by: ANESTHESIOLOGY

## 2022-06-08 PROCEDURE — 84132 ASSAY OF SERUM POTASSIUM: CPT

## 2022-06-08 PROCEDURE — 160025 RECOVERY II MINUTES (STATS): Performed by: OPHTHALMOLOGY

## 2022-06-08 PROCEDURE — 82962 GLUCOSE BLOOD TEST: CPT

## 2022-06-08 PROCEDURE — 160009 HCHG ANES TIME/MIN: Performed by: OPHTHALMOLOGY

## 2022-06-08 PROCEDURE — 160035 HCHG PACU - 1ST 60 MINS PHASE I: Performed by: OPHTHALMOLOGY

## 2022-06-08 PROCEDURE — 160048 HCHG OR STATISTICAL LEVEL 1-5: Performed by: OPHTHALMOLOGY

## 2022-06-08 PROCEDURE — 700102 HCHG RX REV CODE 250 W/ 637 OVERRIDE(OP): Performed by: ANESTHESIOLOGY

## 2022-06-08 PROCEDURE — 700101 HCHG RX REV CODE 250

## 2022-06-08 PROCEDURE — A9270 NON-COVERED ITEM OR SERVICE: HCPCS | Performed by: ANESTHESIOLOGY

## 2022-06-08 PROCEDURE — 160041 HCHG SURGERY MINUTES - EA ADDL 1 MIN LEVEL 4: Performed by: OPHTHALMOLOGY

## 2022-06-08 RX ORDER — ROCURONIUM BROMIDE 10 MG/ML
INJECTION, SOLUTION INTRAVENOUS PRN
Status: DISCONTINUED | OUTPATIENT
Start: 2022-06-08 | End: 2022-06-08 | Stop reason: SURG

## 2022-06-08 RX ORDER — ALBUTEROL SULFATE 2.5 MG/3ML
2.5 SOLUTION RESPIRATORY (INHALATION)
Status: DISCONTINUED | OUTPATIENT
Start: 2022-06-08 | End: 2022-06-08 | Stop reason: HOSPADM

## 2022-06-08 RX ORDER — BALANCED SALT SOLUTION ENRICHED WITH BICARBONATE, DEXTROSE, AND GLUTATHIONE
KIT INTRAOCULAR
Status: DISCONTINUED
Start: 2022-06-08 | End: 2022-06-08 | Stop reason: HOSPADM

## 2022-06-08 RX ORDER — CEFAZOLIN SODIUM 1 G/3ML
INJECTION, POWDER, FOR SOLUTION INTRAMUSCULAR; INTRAVENOUS
Status: DISCONTINUED | OUTPATIENT
Start: 2022-06-08 | End: 2022-06-08 | Stop reason: HOSPADM

## 2022-06-08 RX ORDER — DEXAMETHASONE SODIUM PHOSPHATE 4 MG/ML
INJECTION, SOLUTION INTRA-ARTICULAR; INTRALESIONAL; INTRAMUSCULAR; INTRAVENOUS; SOFT TISSUE PRN
Status: DISCONTINUED | OUTPATIENT
Start: 2022-06-08 | End: 2022-06-08 | Stop reason: SURG

## 2022-06-08 RX ORDER — OXYCODONE HCL 5 MG/5 ML
10 SOLUTION, ORAL ORAL
Status: COMPLETED | OUTPATIENT
Start: 2022-06-08 | End: 2022-06-08

## 2022-06-08 RX ORDER — HYDRALAZINE HYDROCHLORIDE 20 MG/ML
5 INJECTION INTRAMUSCULAR; INTRAVENOUS
Status: DISCONTINUED | OUTPATIENT
Start: 2022-06-08 | End: 2022-06-08 | Stop reason: HOSPADM

## 2022-06-08 RX ORDER — SODIUM CHLORIDE, SODIUM LACTATE, POTASSIUM CHLORIDE, CALCIUM CHLORIDE 600; 310; 30; 20 MG/100ML; MG/100ML; MG/100ML; MG/100ML
INJECTION, SOLUTION INTRAVENOUS CONTINUOUS
Status: DISCONTINUED | OUTPATIENT
Start: 2022-06-08 | End: 2022-06-08 | Stop reason: HOSPADM

## 2022-06-08 RX ORDER — ACETAMINOPHEN 500 MG
1000 TABLET ORAL ONCE
Status: COMPLETED | OUTPATIENT
Start: 2022-06-08 | End: 2022-06-08

## 2022-06-08 RX ORDER — ONDANSETRON 2 MG/ML
4 INJECTION INTRAMUSCULAR; INTRAVENOUS
Status: DISCONTINUED | OUTPATIENT
Start: 2022-06-08 | End: 2022-06-08 | Stop reason: HOSPADM

## 2022-06-08 RX ORDER — DEXTROSE MONOHYDRATE 25 G/50ML
INJECTION, SOLUTION INTRAVENOUS
Status: DISCONTINUED | OUTPATIENT
Start: 2022-06-08 | End: 2022-06-08 | Stop reason: HOSPADM

## 2022-06-08 RX ORDER — HALOPERIDOL 5 MG/ML
1 INJECTION INTRAMUSCULAR
Status: DISCONTINUED | OUTPATIENT
Start: 2022-06-08 | End: 2022-06-08 | Stop reason: HOSPADM

## 2022-06-08 RX ORDER — METOPROLOL TARTRATE 1 MG/ML
1 INJECTION, SOLUTION INTRAVENOUS
Status: DISCONTINUED | OUTPATIENT
Start: 2022-06-08 | End: 2022-06-08 | Stop reason: HOSPADM

## 2022-06-08 RX ORDER — SODIUM CHLORIDE 9 MG/ML
500 INJECTION, SOLUTION INTRAVENOUS
Status: DISCONTINUED | OUTPATIENT
Start: 2022-06-08 | End: 2022-06-08 | Stop reason: HOSPADM

## 2022-06-08 RX ORDER — BALANCED SALT SOLUTION 6.4; .75; .48; .3; 3.9; 1.7 MG/ML; MG/ML; MG/ML; MG/ML; MG/ML; MG/ML
SOLUTION OPHTHALMIC
Status: DISCONTINUED | OUTPATIENT
Start: 2022-06-08 | End: 2022-06-08 | Stop reason: HOSPADM

## 2022-06-08 RX ORDER — PHENYLEPHRINE HYDROCHLORIDE 25 MG/ML
SOLUTION/ DROPS OPHTHALMIC
Status: COMPLETED
Start: 2022-06-08 | End: 2022-06-08

## 2022-06-08 RX ORDER — BALANCED SALT SOLUTION ENRICHED WITH BICARBONATE, DEXTROSE, AND GLUTATHIONE
KIT INTRAOCULAR
Status: DISCONTINUED | OUTPATIENT
Start: 2022-06-08 | End: 2022-06-08 | Stop reason: HOSPADM

## 2022-06-08 RX ORDER — OXYCODONE HCL 5 MG/5 ML
5 SOLUTION, ORAL ORAL
Status: COMPLETED | OUTPATIENT
Start: 2022-06-08 | End: 2022-06-08

## 2022-06-08 RX ORDER — ONDANSETRON 2 MG/ML
INJECTION INTRAMUSCULAR; INTRAVENOUS PRN
Status: DISCONTINUED | OUTPATIENT
Start: 2022-06-08 | End: 2022-06-08 | Stop reason: SURG

## 2022-06-08 RX ORDER — ATROPINE SULFATE 10 MG/ML
SOLUTION/ DROPS OPHTHALMIC
Status: DISCONTINUED | OUTPATIENT
Start: 2022-06-08 | End: 2022-06-08 | Stop reason: HOSPADM

## 2022-06-08 RX ORDER — CYCLOPENTOLATE HYDROCHLORIDE 10 MG/ML
SOLUTION/ DROPS OPHTHALMIC
Status: COMPLETED
Start: 2022-06-08 | End: 2022-06-08

## 2022-06-08 RX ORDER — DIPHENHYDRAMINE HYDROCHLORIDE 50 MG/ML
12.5 INJECTION INTRAMUSCULAR; INTRAVENOUS
Status: DISCONTINUED | OUTPATIENT
Start: 2022-06-08 | End: 2022-06-08 | Stop reason: HOSPADM

## 2022-06-08 RX ORDER — NEOMYCIN SULFATE, POLYMYXIN B SULFATE, AND DEXAMETHASONE 3.5; 10000; 1 MG/G; [USP'U]/G; MG/G
OINTMENT OPHTHALMIC
Status: DISCONTINUED | OUTPATIENT
Start: 2022-06-08 | End: 2022-06-08 | Stop reason: HOSPADM

## 2022-06-08 RX ADMIN — CYCLOPENTOLATE HYDROCHLORIDE 1 DROP: 10 SOLUTION OPHTHALMIC at 13:55

## 2022-06-08 RX ADMIN — SUGAMMADEX 200 MG: 100 INJECTION, SOLUTION INTRAVENOUS at 16:42

## 2022-06-08 RX ADMIN — PHENYLEPHRINE HYDROCHLORIDE 1 DROP: 25 SOLUTION/ DROPS OPHTHALMIC at 13:55

## 2022-06-08 RX ADMIN — ROCURONIUM BROMIDE 50 MG: 10 INJECTION, SOLUTION INTRAVENOUS at 16:10

## 2022-06-08 RX ADMIN — ACETAMINOPHEN 1000 MG: 500 TABLET, FILM COATED ORAL at 13:55

## 2022-06-08 RX ADMIN — PROPOFOL 100 MG: 10 INJECTION, EMULSION INTRAVENOUS at 16:10

## 2022-06-08 RX ADMIN — DEXAMETHASONE SODIUM PHOSPHATE 4 MG: 4 INJECTION, SOLUTION INTRA-ARTICULAR; INTRALESIONAL; INTRAMUSCULAR; INTRAVENOUS; SOFT TISSUE at 16:10

## 2022-06-08 RX ADMIN — ONDANSETRON 4 MG: 2 INJECTION INTRAMUSCULAR; INTRAVENOUS at 16:10

## 2022-06-08 RX ADMIN — OXYCODONE HYDROCHLORIDE 5 MG: 5 SOLUTION ORAL at 17:14

## 2022-06-08 RX ADMIN — FENTANYL CITRATE 50 MCG: 50 INJECTION, SOLUTION INTRAMUSCULAR; INTRAVENOUS at 16:10

## 2022-06-08 ASSESSMENT — PAIN DESCRIPTION - PAIN TYPE
TYPE: SURGICAL PAIN

## 2022-06-08 NOTE — ANESTHESIA TIME REPORT
Anesthesia Start and Stop Event Times     Date Time Event    6/8/2022 1544 Ready for Procedure     1605 Anesthesia Start     1653 Anesthesia Stop        Responsible Staff  06/08/22    Name Role Begin End    Dyana Valentin M.D. Anesth 1605 1651        Overtime Reason:  no overtime (within assigned shift)    Comments:

## 2022-06-08 NOTE — ANESTHESIA PROCEDURE NOTES
Airway    Date/Time: 6/8/2022 4:11 PM  Performed by: Dyana Valentin M.D.  Authorized by: Dyana Valentin M.D.     Location:  OR  Urgency:  Elective  Indications for Airway Management:  Anesthesia      Spontaneous Ventilation: absent    Sedation Level:  Deep  Preoxygenated: Yes    Final Airway Type:  Supraglottic airway  Final Supraglottic Airway:  Standard LMA    SGA Size:  4  Number of Attempts at Approach:  1

## 2022-06-08 NOTE — ANESTHESIA POSTPROCEDURE EVALUATION
Patient: Miley Whittington    Procedure Summary     Date: 06/08/22 Room / Location: [facility] ROOM 24 / SURGERY SAME DAY [facility]    Anesthesia Start: 1605 Anesthesia Stop: 1653    Procedure: VITRECTOMY, POSTERIOR PORTION - PARS PLANA, DISSECTION, LASER (Right Eye) Diagnosis: (TRACTZONAL RETINAL DETACHMENT RIGHT EYE)    Surgeons: Darline Banks M.D. Responsible Provider: Dyana Valentin M.D.    Anesthesia Type: general ASA Status: 3          Final Anesthesia Type: general  Last vitals  BP   Blood Pressure : (!) 165/86    Temp   36.6 °C (97.8 °F)    Pulse   75   Resp   18    SpO2   96 %      Anesthesia Post Evaluation    Patient location during evaluation: PACU  Patient participation: complete - patient participated  Level of consciousness: awake and alert    Airway patency: patent  Anesthetic complications: no  Cardiovascular status: hemodynamically stable  Respiratory status: acceptable  Hydration status: euvolemic    PONV: none          No complications documented.     Nurse Pain Score: 0 (NPRS)

## 2022-06-08 NOTE — OP REPORT
Procedure: 25g  Pars Plana Vitrectomy, Endolaser,  Right eye     Pre op Diagnosis: Nonclearing vitreous hemorrhage, Right eye     Post op Diagnosis:  Nonclearing vitreous hemorrhage, Right eye     Findings:  Dense Nonclearing vitreous hemorrhage, attenuated vessels and scattered dot heme 360, Right eye         Immediately prior to procedure, time out was performed to include correct patient, agreement on procedure to be performed, correct side, accurate procedure consent, antibiotics, fluids, safety precautions, and availability of any special implants that might be required.    The patient was taken back into operating area. Subsequently the patient received general anesthesia from the anesthesia department. The eye was then prepped and draped in the usual sterile fashion for ophthalmic surgery, and a lid speculum was placed.     A 25-gauge trocar-cannula system was used to place a cannula in the typical beveled entry with conjunctival displacement in the inferotemporal quadrant. An infusion line was assembled and flushed for all in-line air. The infusion line was placed into the vitreous cavity and was directly visualized prior to unclamping. Once it was unclamped, 2 additional cannulas were placed in the superotemporal and superonasal quadrants in a similar fashion. At this time, the vitreous cutter and light pipe were introduced into the eye and using the wide-angle viewing system, a core vitrectomy was performed. At all times throughout the case light exposure to the macula and fovea were minimized to reduce the risk of phototoxicity. Core vitrectomy was performed and the vitreous was carefully shaved in the periphery.  PVD was lifted.  No signs of active bleeding was noted.  No holes or tears were noted on depressed exam.  360 endolaser was done.   Sclerostomies were watertight. Infusion cannula was removed and site was closed. Subconjunctival injection of dexamethasone and Ancef were given. Lid speculum was  removed. Atropine and Maxitrol ophthalmic ointment was placed in the eye and the eye was patched and overlayed with Garcia shield.     The patient tolerated the procedure well without any complications, was taken to the postoperative recovery area in good condition.

## 2022-06-08 NOTE — ANESTHESIA PREPROCEDURE EVALUATION
Case: [id number] Date/Time: 06/08/22 1445    Procedure: VITRECTOMY, POSTERIOR PORTION - PARS PLANA MEMBRANE, DISECTION, BRILLIANT BLUE G, LASER INTRAOCULAR GAS (Right )    Pre-op diagnosis: TRACTZONAL RETINAL DETACHMENT RIGHT EYE    Location: [facility] ROOM 24 / SURGERY SAME DAY [facility]    Surgeons: Darline Banks M.D.          Relevant Problems   PULMONARY   (positive) COPD (chronic obstructive pulmonary disease) ([facility])      CARDIAC   (positive) HTN (hypertension)         (positive) JEN (acute kidney injury) ([facility])      ENDO   (positive) DM (diabetes mellitus), type 2 ([facility])       Physical Exam    Airway   Mallampati: II  TM distance: >3 FB  Neck ROM: full       Cardiovascular - normal exam  Rhythm: regular  Rate: normal  (-) murmur     Dental - normal exam  (+) upper dentures           Pulmonary - normal exam  Breath sounds clear to auscultation     Abdominal    Neurological - normal exam                 Anesthesia Plan    ASA 3   ASA physical status 3 criteria: COPD, diabetes - poorly controlled and hypertension - poorly controlled    Plan - general       Airway plan will be LMA        Plan Factors:   Patient was previously instructed to abstain from smoking on day of procedure.  Patient did not smoke on day of procedure.      Induction: intravenous      Pertinent diagnostic labs and testing reviewed    Informed Consent:    Anesthetic plan and risks discussed with patient.    Use of blood products discussed with: patient whom consented to blood products.

## 2022-06-08 NOTE — OR NURSING
1650 patient arrived from OR, report received, attached to monitoring. VSS, patient oxygenating well on 8 L oxymask, dressing to right eye; gauze, tape; clean/dry intact, patient denies pain and nausea at this moment     1714 patient c/o pain medicated per EMAR    1718 Telephone updates to Diya owen    1747 discharge instructions given to Diya owen, questions answered    1802 patient dc home in stable condition, meets dc criteria at this time, piv dc tip intact, escorted out via wheelchair with JT Barnard

## 2022-06-09 NOTE — DISCHARGE INSTRUCTIONS
ACTIVITY: Rest and take it easy for the first 24 hours.  A responsible adult is recommended to remain with you during that time.  It is normal to feel sleepy.  We encourage you to not do anything that requires balance, judgment or coordination.    MILD FLU-LIKE SYMPTOMS ARE NORMAL. YOU MAY EXPERIENCE GENERALIZED MUSCLE ACHES, THROAT IRRITATION, HEADACHE AND/OR SOME NAUSEA.    FOR 24 HOURS DO NOT:  Drive, operate machinery or run household appliances.  Drink beer or alcoholic beverages.   Make important decisions or sign legal documents.      DIET: To avoid nausea, slowly advance diet as tolerated, avoiding spicy or greasy foods for the first day.  Add more substantial food to your diet according to your physician's instructions. INCREASE FLUIDS AND FIBER TO AVOID CONSTIPATION.    SURGICAL DRESSING/BATHING: Keep clean and dry until follow up, No showers or baths until after follow up     FOLLOW-UP APPOINTMENT:  A follow-up appointment should be arranged with your doctor; call to schedule.    You should CALL YOUR PHYSICIAN if you develop:  Fever greater than 101 degrees F.  Pain not relieved by medication, or persistent nausea or vomiting.  Excessive bleeding (blood soaking through dressing) or unexpected drainage from the wound.  Extreme redness or swelling around the incision site, drainage of pus or foul smelling drainage.  Inability to urinate or empty your bladder within 8 hours.      You should call 911 if you develop problems with breathing or chest pain.  If you are unable to contact your doctor or surgical center, you should go to the nearest emergency room or urgent care center.      Physician's telephone #: Dr. Banks @ [phone]    If any questions arise, call your doctor.  If your doctor is not available, please feel free to call the Surgical Center at [phone].     A registered nurse may call you a few days after your surgery to see how you are doing after your procedure.    MEDICATIONS:  Resume taking daily medication.  Take prescribed pain medication with food.  If no medication is prescribed, you may take non-aspirin pain medication if needed.  PAIN MEDICATION CAN BE VERY CONSTIPATING.  Take a stool softener or laxative such as senokot, pericolace, or milk of magnesia if needed.    Prescription given for at home.  Last pain medication given at 5:14 PM, Next dose okay at 9:14 PM.    If your physician has prescribed pain medication that includes Acetaminophen (Tylenol), do not take additional Acetaminophen (Tylenol) while taking the prescribed medication.    Depression / Suicide Risk    As you are discharged from this Critical access hospital facility, it is important to learn how to keep safe from harming yourself.    Recognize the warning signs:  Abrupt changes in personality, positive or negative- including increase in energy   Giving away possessions  Change in eating patterns- significant weight changes-  positive or negative  Change in sleeping patterns- unable to sleep or sleeping all the time   Unwillingness or inability to communicate  Depression  Unusual sadness, discouragement and loneliness  Talk of wanting to die  Neglect of personal appearance   Rebelliousness- reckless behavior  Withdrawal from people/activities they love  Confusion- inability to concentrate     If you or a loved one observes any of these behaviors or has concerns about self-harm, here's what you can do:  Talk about it- your feelings and reasons for harming yourself  Remove any means that you might use to hurt yourself (examples: pills, rope, extension cords, firearm)  Get professional help from the community (Mental Health, Substance Abuse, psychological counseling)  Do not be alone:Call your Safe Contact- someone whom you trust who will be there for you.  Call your local CRISIS HOTLINE 152-6889 or 151-518-4835  Call your local Children's Mobile Crisis Response Team [place] (615) 482-9743 or www.[employer]  Call the  toll free National Suicide Prevention Hotlines   National Suicide Prevention Lifeline 446-792-BBRX (1985)  National [place] Line Network 800-SUICIDE (162-5216)

## 2022-11-14 ENCOUNTER — OFFICE VISIT (OUTPATIENT)
Dept: ENDOCRINOLOGY | Facility: MEDICAL CENTER | Age: 67
End: 2022-11-14
Attending: INTERNAL MEDICINE
Payer: COMMERCIAL

## 2022-11-14 VITALS
DIASTOLIC BLOOD PRESSURE: 88 MMHG | SYSTOLIC BLOOD PRESSURE: 120 MMHG | BODY MASS INDEX: 29.41 KG/M2 | WEIGHT: 166 LBS | OXYGEN SATURATION: 98 % | HEIGHT: 63 IN | HEART RATE: 104 BPM

## 2022-11-14 DIAGNOSIS — Z79.4 LONG-TERM INSULIN USE (HCC): ICD-10-CM

## 2022-11-14 DIAGNOSIS — E55.9 VITAMIN D DEFICIENCY: ICD-10-CM

## 2022-11-14 DIAGNOSIS — E11.65 TYPE 2 DIABETES MELLITUS WITH HYPERGLYCEMIA, WITH LONG-TERM CURRENT USE OF INSULIN (HCC): ICD-10-CM

## 2022-11-14 DIAGNOSIS — E78.5 DYSLIPIDEMIA: ICD-10-CM

## 2022-11-14 DIAGNOSIS — Z79.4 TYPE 2 DIABETES MELLITUS WITH HYPERGLYCEMIA, WITH LONG-TERM CURRENT USE OF INSULIN (HCC): ICD-10-CM

## 2022-11-14 LAB
HBA1C MFR BLD: 7.9 % (ref 0–5.6)
INT CON NEG: ABNORMAL
INT CON POS: ABNORMAL

## 2022-11-14 PROCEDURE — 83036 HEMOGLOBIN GLYCOSYLATED A1C: CPT | Performed by: INTERNAL MEDICINE

## 2022-11-14 PROCEDURE — 99205 OFFICE O/P NEW HI 60 MIN: CPT | Performed by: INTERNAL MEDICINE

## 2022-11-14 PROCEDURE — 99212 OFFICE O/P EST SF 10 MIN: CPT | Performed by: INTERNAL MEDICINE

## 2022-11-14 PROCEDURE — 92250 FUNDUS PHOTOGRAPHY W/I&R: CPT | Performed by: INTERNAL MEDICINE

## 2022-11-14 RX ORDER — SEMAGLUTIDE 1.34 MG/ML
0.5 INJECTION, SOLUTION SUBCUTANEOUS
Qty: 1.5 ML | Refills: 6 | Status: SHIPPED | OUTPATIENT
Start: 2022-11-19

## 2022-11-14 RX ORDER — SEMAGLUTIDE 1.34 MG/ML
0.5 INJECTION, SOLUTION SUBCUTANEOUS
Qty: 1.5 ML | Refills: 6 | Status: SHIPPED | OUTPATIENT
Start: 2022-11-19 | End: 2022-11-14 | Stop reason: SDUPTHER

## 2022-11-14 RX ORDER — INSULIN DEGLUDEC 200 U/ML
50 INJECTION, SOLUTION SUBCUTANEOUS
Qty: 9 ML | Refills: 5 | COMMUNITY
Start: 2022-11-14

## 2022-11-14 ASSESSMENT — PATIENT HEALTH QUESTIONNAIRE - PHQ9: CLINICAL INTERPRETATION OF PHQ2 SCORE: 0

## 2022-11-14 NOTE — PROGRESS NOTES
"Chief Complaint:  Consult requested by DALY Pan for initial evaluation of Type 2 Diabetes Mellitus    HPI:   Miley Whittington is a 67 y.o. female with Type 2 Diabetes Mellitus diagnosed in 1991  She denies hospitalizations for DKA in the past.    Her a1c is 7.9% on 11/14/2022  Her A1c is 9.6% on May 31, 2022      Currently she is taking   Levemir 40 units at night,   Humalog sliding scale 10u QAC,   Januvia 100 mg daily,   Ozempic 0.25 mg once a week,   Glimepiride 2 mg every morning      She monitors blood glucose  4 times per day.   She doesn't know the name of her meter.   She failed to bring her meter  Blood glucose values range: Fasting 150-170          She denies hypoglycemic episodes She  denies hypoglycemic unawareness. She denies episodes of severe hypoglycemia requiring third party assistance.  She  is not wearing a medical alert bracelet or necklace.  She does not a glucagon emergency kit.    She reports attending diabetes education classes.  Diet: \"healthy\" diet  in general. Oatmeal and toast for breakfast, sandwich for lunch and hamburger and potatoes for dinner    Diabetes Complications   She  reports history of retinopathy.  She denies laser eye surgery. Last eye exam: May 2022  She reports history of peripheral sensory neuropathy.  She reports numbness, tingling in both feet.  She denies history of foot sores.   She denies history of kidney damage.  She is on ACE inhibitor or ARB.   She denies history of coronary artery disease.  She  denies history of stroke and denies TIA.  She denies history of PAD.  She reports history of hyperlipidemia.      ROS:     CONS:     No fever, no chills, no weight loss, reports fatigue   EYES:      No diplopia, reports blurry vision, no redness of eyes, no swelling of eyelids   ENT:    No hearing loss, No ear pain, No sore throat, no dysphagia, no neck swelling   CV:     No chest pain, no palpitations, no claudication, no orthopnea, no PND   PULM:    No " SOB, no cough, no hemoptysis, no wheezing    GI:   No nausea, no vomiting, no diarrhea, no constipation, no bloody stools   :  Passing urine well, no dysuria, no hematuria   ENDO:   reports polyuria, no polydipsia, no heat intolerance, no cold intolerance   NEURO: No headaches, no dizziness, no convulsions, no tremors   MUSC:  No joint swellings, no arthralgias, no myalgias, no weakness   SKIN:   No rash, no ulcers, no dry skin   PSYCH:   No depression, no anxiety, no difficulty sleeping       Past Medical History:  Patient Active Problem List    Diagnosis Date Noted    Long-term insulin use ([facility]) 11/14/2022    Dyslipidemia 11/14/2022    Leukocytosis 01/28/2017    BMI 35.0-35.9,adult 01/28/2017    DM (diabetes mellitus), type 2 ([facility]) 01/28/2017    COPD (chronic obstructive pulmonary disease) ([facility]) 01/28/2017    HTN (hypertension) 01/28/2017    Fracture of distal femur ([facility]) 01/27/2017    Sepsis ([facility]) 07/04/2016    JEN (acute kidney injury) ([facility]) 07/04/2016       Past Surgical History:  Past Surgical History:   Procedure Laterality Date    VITRECTOMY POSTERIOR Right 6/8/2022    Procedure: VITRECTOMY, POSTERIOR PORTION - PARS PLANA, DISSECTION, LASER;  Surgeon: Darline Banks M.D.;  Location: SURGERY SAME DAY [facility];  Service: Ophthalmology    ORIF, FRACTURE, FEMUR  1/28/2017    Procedure: FEMUR ORIF - DISTAL ;  Surgeon: Tl Tenorio M.D.;  Location: SURGERY [facility];  Service:     CHOLECYSTECTOMY          Allergies:  Metformin and Sulfa drugs     Current Medications:    Current Outpatient Medications:     [START ON 11/19/2022] Semaglutide,0.25 or 0.5MG/DOS, (OZEMPIC, 0.25 OR 0.5 MG/DOSE,) 2 MG/1.5ML Solution Pen-injector, Inject 0.5 mg under the skin every Saturday., Disp: 1.5 mL, Rfl: 6    Insulin Degludec (TRESIBA FLEXTOUCH) 200 UNIT/ML Solution Pen-injector, Inject 50 Units under the skin at bedtime. 3 pens per month, Disp: 9 mL, Rfl: 5    insulin lispro (HUMALOG) 100 UNIT/ML, Inject  under the  skin 2 times a day with meals. Using sliding scale, Disp: , Rfl:     acetaminophen (TYLENOL) 325 MG Tab, Take 1-2 Tablets by mouth as needed. Indications: Pain, Disp: , Rfl:     Multiple Vitamin (MULTIVITAMIN PO), Take 1 Tablet by mouth every day., Disp: , Rfl:     lisinopril (PRINIVIL) 5 MG Tab, Take 1 Tablet by mouth every morning. Indications: High Blood Pressure Disorder, Disp: , Rfl:     tiotropium (SPIRIVA RESPIMAT) 2.5 mcg/Act Aero Soln, Inhale 2 Inhalation at bedtime. Indications: Chronic Obstructive Lung Disease, Disp: , Rfl:     vitamin D2, Ergocalciferol, (DRISDOL) 1.25 MG (10227 UT) Cap capsule, Take  by mouth every 7 days., Disp: , Rfl:     Fluticasone Furoate-Vilanterol (BREO ELLIPTA) 100-25 MCG/INH AEROSOL POWDER, BREATH ACTIVATED, Inhale 1 Puff every morning. Indications: Chronic Obstructive Lung Disease, Disp: , Rfl:     amLODIPine (NORVASC) 10 MG Tab, Take 1 Tablet by mouth every evening. Indications: High Blood Pressure Disorder, Disp: , Rfl:     naproxen (NAPROSYN) 500 MG Tab, Take 1 Tablet by mouth every evening. Indications: Joint Damage causing Pain and Loss of Function, Pain, Disp: , Rfl:     omeprazole (PRILOSEC) 20 MG delayed-release capsule, Take 1 Capsule by mouth at bedtime. Indications: Gastroesophageal Reflux Disease, Disp: , Rfl:     atorvastatin (LIPITOR) 40 MG Tab, Take 1 Tablet by mouth every evening., Disp: , Rfl:     metoprolol tartrate (LOPRESSOR) 50 MG Tab, Take 1 Tablet by mouth 2 times a day., Disp: , Rfl:     latanoprost (XALATAN) 0.005 % Solution, Administer 1 Drop into both eyes every evening., Disp: , Rfl:     spironolactone (ALDACTONE) 25 MG Tab, Take 1 Tablet by mouth every morning., Disp: , Rfl:     carboxymethylcellulose (REFRESH TEARS) 0.5 % Solution, Administer 1 Drop into both eyes as needed., Disp: , Rfl:     gabapentin (NEURONTIN) 100 MG Cap, Take 1 Capsule by mouth at bedtime., Disp: , Rfl:     Social History:  Social History     Socioeconomic History    Marital  "status:      Spouse name: Not on file    Number of children: Not on file    Years of education: Not on file    Highest education level: Not on file   Occupational History    Not on file   Tobacco Use    Smoking status: Every Day     Types: Cigarettes    Smokeless tobacco: Never    Tobacco comments:     6-8 cigarettes a day   Vaping Use    Vaping Use: Never used   Substance and Sexual Activity    Alcohol use: Not Currently    Drug use: Not Currently    Sexual activity: Not on file   Other Topics Concern    Not on file   Social History Narrative    Not on file     Social Determinants of Health     Financial Resource Strain: Not on file   Food Insecurity: Not on file   Transportation Needs: Not on file   Physical Activity: Not on file   Stress: Not on file   Social Connections: Not on file   Intimate Partner Violence: Not on file   Housing Stability: Not on file        Family History:   History reviewed. No pertinent family history.    PHYSICAL EXAM:   Vital signs: /88 (BP Location: Left arm, Patient Position: Sitting, BP Cuff Size: Adult)   Pulse (!) 104   Ht 1.6 m (5' 3\")   Wt 75.3 kg (166 lb)   LMP  (LMP Unknown)   SpO2 98%   BMI 29.41 kg/m²   GENERAL: Well-developed, well-nourished  in no apparent distress.   EYE: No ocular and eyelid asymmetry, Anicteric sclerae,  PERRL, No exophthalmos or lidlag  HENT: Hearing grossly intact, Normocephalic, atraumatic. Pink, moist mucous membranes, No exudate  NECK: Supple. Trachea midline. thyroid is normal in size without nodules or tenderness  CARDIOVASCULAR: Regular rate and rhythm. No murmurs, rubs, or gallops.   LUNGS: Clear to auscultation bilaterally   ABDOMEN: Soft, nontender with positive bowel sounds.   EXTREMITIES: No clubbing, cyanosis, or edema.   NEUROLOGICAL: Cranial nerves II-XII are grossly intact   Symmetric reflexes at the patella no proximal muscle weakness, No visible tremor with both outstretched hands  LYMPH: No cervical, " supraclavicular,  adenopathy palpated.   SKIN: No rashes, lesions. Turgor is normal.  FOOT: Normal sensation to monofilament testing, normal pulses, no ulcers.  Normal Vibration quantitative sensation test.    Labs:  Lab Results   Component Value Date/Time    HBA1C 9.6 (H) 05/31/2022 1540    AVGLUC 229 05/31/2022 1540       Lab Results   Component Value Date/Time    WBC 11.4 (H) 02/02/2017 02:55 AM    RBC 3.33 (L) 02/02/2017 02:55 AM    HEMOGLOBIN 9.5 (L) 02/02/2017 02:55 AM    MCV 88.9 02/02/2017 02:55 AM    MCH 28.5 02/02/2017 02:55 AM    MCHC 32.1 (L) 02/02/2017 02:55 AM    RDW 42.1 02/02/2017 02:55 AM    MPV 9.9 02/02/2017 02:55 AM       Lab Results   Component Value Date/Time    SODIUM 140 05/31/2022 03:40 PM    POTASSIUM 4.0 06/08/2022 01:51 PM    CHLORIDE 110 05/31/2022 03:40 PM    CO2 23 05/31/2022 03:40 PM    ANION 7.0 05/31/2022 03:40 PM    GLUCOSE 110 (H) 05/31/2022 03:40 PM    BUN 13 05/31/2022 03:40 PM    CREATININE 0.72 05/31/2022 03:40 PM    CALCIUM 9.3 05/31/2022 03:40 PM    ASTSGOT 23 02/05/2017 03:07 PM    ALTSGPT 17 02/05/2017 03:07 PM    TBILIRUBIN 0.6 02/05/2017 03:07 PM    ALBUMIN 2.9 (L) 02/05/2017 03:07 PM    TOTPROTEIN 5.7 (L) 02/05/2017 03:07 PM    GLOBULIN 2.8 02/05/2017 03:07 PM    AGRATIO 1.0 02/05/2017 03:07 PM       Lab Results   Component Value Date/Time    CHOLSTRLTOT 142 07/05/2016 0001    TRIGLYCERIDE 303 (H) 07/05/2016 0001    HDL 24 (A) 07/05/2016 0001    LDL 57 07/05/2016 0001       No results found for: MICROALBCALC, MALBCRT, MALBEXCR, ZBUZEM17, MICROALBUR, MICRALB, UMICROALBUM, MICROALBTIM     Lab Results   Component Value Date/Time    TSHULTJOHN 1.590 01/27/2017 1600     No results found for: FREEDIR  No results found for: FREET3  No results found for: THYSTIMIG      ASSESSMENT/PLAN:     1. Type 2 diabetes mellitus with hyperglycemia, with long-term current use of insulin (HCC)  Uncontrolled secondary to hyperglycemia  Reviewed diagnosis of type 2 diabetes and the importance  of good glucose control preventing long-term vascular complications  Recommend that she stop Januvia because she is already on a GLP-1  Recommend that she stop glimepiride because she is already on a GLP-1 and mealtime insulin  Recommend that she stop Levemir because it is an inferior basal insulin  I want her to start Tresiba 50 units daily and titrate and adjust until fasting sugar is at goal  Increase Ozempic to 0.5 mg once a week  Okay to continue Humalog 10 units with each meal for now  I want her to bring glucose logs and her glucose meter  She may be a candidate for an SGLT2 inhibitor which we can discuss on her next follow-up after review her next A1c and labs  Today I want her to get updated labs to check her serum creatinine fasting lipid TSH urine albumin C-peptide john 65  We are getting a retina exam today foot exam was completed today  I want her to follow-up in 3 months with repeat labs including serum creatinine lipids TSH and urine albumin      2. Dyslipidemia  Controlled   continue atorvastatin  Repeat fasting lipids this week and in 3 months    3. Vitamin D deficiency  Stable  Continue monitoring    4. Long-term insulin use (HCC)  Patient is on long-term basal insulin therapy for type 2 diabetes      Return in about 3 months (around 2/14/2023).       This patient during there office visit was started on new medication.  Side effects of new medications were discussed with the patient today in the office. The patient was supplied paperwork on this new medication.    Thank you kindly for allowing me to participate in the diabetes care plan for this patient.    Total time spent on date of service was over 60 minutes which included obtaining a vehicle history and physical exam, ordering labs, cording care and scheduling future follow-up    Joe Rm MD, FACE, [facility]  11/14/22    CC:   DALY Pan

## 2022-11-15 ENCOUNTER — HOSPITAL ENCOUNTER (OUTPATIENT)
Dept: LAB | Facility: MEDICAL CENTER | Age: 67
End: 2022-11-15
Attending: INTERNAL MEDICINE
Payer: COMMERCIAL

## 2022-11-15 DIAGNOSIS — E11.65 TYPE 2 DIABETES MELLITUS WITH HYPERGLYCEMIA, WITH LONG-TERM CURRENT USE OF INSULIN (HCC): ICD-10-CM

## 2022-11-15 DIAGNOSIS — Z79.4 LONG-TERM INSULIN USE (HCC): ICD-10-CM

## 2022-11-15 DIAGNOSIS — Z79.4 TYPE 2 DIABETES MELLITUS WITH HYPERGLYCEMIA, WITH LONG-TERM CURRENT USE OF INSULIN (HCC): ICD-10-CM

## 2022-11-15 DIAGNOSIS — E55.9 VITAMIN D DEFICIENCY: ICD-10-CM

## 2022-11-15 DIAGNOSIS — E78.5 DYSLIPIDEMIA: ICD-10-CM

## 2022-11-15 LAB
25(OH)D3 SERPL-MCNC: 48 NG/ML (ref 30–100)
ALBUMIN SERPL BCP-MCNC: 4.1 G/DL (ref 3.2–4.9)
ALBUMIN/GLOB SERPL: 1.3 G/DL
ALP SERPL-CCNC: 157 U/L (ref 30–99)
ALT SERPL-CCNC: 23 U/L (ref 2–50)
ANION GAP SERPL CALC-SCNC: 11 MMOL/L (ref 7–16)
AST SERPL-CCNC: 19 U/L (ref 12–45)
BILIRUB SERPL-MCNC: 0.4 MG/DL (ref 0.1–1.5)
BUN SERPL-MCNC: 16 MG/DL (ref 8–22)
CALCIUM SERPL-MCNC: 9.9 MG/DL (ref 8.5–10.5)
CHLORIDE SERPL-SCNC: 103 MMOL/L (ref 96–112)
CHOLEST SERPL-MCNC: 229 MG/DL (ref 100–199)
CO2 SERPL-SCNC: 24 MMOL/L (ref 20–33)
CREAT SERPL-MCNC: 0.65 MG/DL (ref 0.5–1.4)
CREAT UR-MCNC: 87.98 MG/DL
FASTING STATUS PATIENT QL REPORTED: NORMAL
GFR SERPLBLD CREATININE-BSD FMLA CKD-EPI: 95 ML/MIN/1.73 M 2
GLOBULIN SER CALC-MCNC: 3.1 G/DL (ref 1.9–3.5)
GLUCOSE SERPL-MCNC: 260 MG/DL (ref 65–99)
HDLC SERPL-MCNC: 49 MG/DL
LDLC SERPL CALC-MCNC: 135 MG/DL
MICROALBUMIN UR-MCNC: 86.5 MG/DL
MICROALBUMIN/CREAT UR: 983 MG/G (ref 0–30)
POTASSIUM SERPL-SCNC: 4.6 MMOL/L (ref 3.6–5.5)
PROT SERPL-MCNC: 7.2 G/DL (ref 6–8.2)
RETINAL SCREEN: NORMAL
SODIUM SERPL-SCNC: 138 MMOL/L (ref 135–145)
T4 FREE SERPL-MCNC: 1.46 NG/DL (ref 0.93–1.7)
TRIGL SERPL-MCNC: 227 MG/DL (ref 0–149)
TSH SERPL DL<=0.005 MIU/L-ACNC: 1.44 UIU/ML (ref 0.38–5.33)

## 2022-11-15 PROCEDURE — 82306 VITAMIN D 25 HYDROXY: CPT

## 2022-11-15 PROCEDURE — 82570 ASSAY OF URINE CREATININE: CPT

## 2022-11-15 PROCEDURE — 84681 ASSAY OF C-PEPTIDE: CPT

## 2022-11-15 PROCEDURE — 84443 ASSAY THYROID STIM HORMONE: CPT

## 2022-11-15 PROCEDURE — 84439 ASSAY OF FREE THYROXINE: CPT

## 2022-11-15 PROCEDURE — 82043 UR ALBUMIN QUANTITATIVE: CPT

## 2022-11-15 PROCEDURE — 80061 LIPID PANEL: CPT

## 2022-11-15 PROCEDURE — 86341 ISLET CELL ANTIBODY: CPT

## 2022-11-15 PROCEDURE — 80053 COMPREHEN METABOLIC PANEL: CPT

## 2022-11-15 PROCEDURE — 36415 COLL VENOUS BLD VENIPUNCTURE: CPT

## 2022-11-17 LAB — C PEPTIDE SERPL-MCNC: 3.9 NG/ML (ref 0.5–3.3)

## 2022-11-18 LAB — GAD65 AB SER IA-ACNC: <5 IU/ML (ref 0–5)

## 2023-10-07 ENCOUNTER — HOSPITAL ENCOUNTER (INPATIENT)
Facility: MEDICAL CENTER | Age: 68
LOS: 11 days | DRG: 481 | End: 2023-10-18
Attending: HOSPITALIST | Admitting: HOSPITALIST
Payer: MEDICARE

## 2023-10-07 ENCOUNTER — APPOINTMENT (OUTPATIENT)
Dept: RADIOLOGY | Facility: MEDICAL CENTER | Age: 68
End: 2023-10-07
Payer: OTHER GOVERNMENT

## 2023-10-07 ENCOUNTER — APPOINTMENT (OUTPATIENT)
Dept: RADIOLOGY | Facility: MEDICAL CENTER | Age: 68
DRG: 481 | End: 2023-10-07
Attending: EMERGENCY MEDICAL TECHNICIAN, INTERMEDIATE
Payer: MEDICARE

## 2023-10-07 DIAGNOSIS — S72.001A CLOSED RIGHT HIP FRACTURE, INITIAL ENCOUNTER (HCC): ICD-10-CM

## 2023-10-07 DIAGNOSIS — S72.001A CLOSED FRACTURE OF NECK OF RIGHT FEMUR, INITIAL ENCOUNTER (HCC): ICD-10-CM

## 2023-10-07 DIAGNOSIS — Z79.4 TYPE 2 DIABETES MELLITUS WITH HYPERGLYCEMIA, WITH LONG-TERM CURRENT USE OF INSULIN (HCC): ICD-10-CM

## 2023-10-07 DIAGNOSIS — E87.1 HYPONATREMIA: ICD-10-CM

## 2023-10-07 DIAGNOSIS — Z79.899 ON DEEP VEIN THROMBOSIS (DVT) PROPHYLAXIS: ICD-10-CM

## 2023-10-07 DIAGNOSIS — K59.00 CONSTIPATION, UNSPECIFIED CONSTIPATION TYPE: ICD-10-CM

## 2023-10-07 DIAGNOSIS — E11.65 TYPE 2 DIABETES MELLITUS WITH HYPERGLYCEMIA, WITH LONG-TERM CURRENT USE OF INSULIN (HCC): ICD-10-CM

## 2023-10-07 PROBLEM — M84.353A FEMORAL NECK STRESS FRACTURE, INITIAL ENCOUNTER: Status: ACTIVE | Noted: 2023-10-07

## 2023-10-07 PROBLEM — Z71.89 ACP (ADVANCE CARE PLANNING): Status: ACTIVE | Noted: 2023-10-07

## 2023-10-07 PROBLEM — S72.009A FEMORAL NECK FRACTURE (HCC): Status: ACTIVE | Noted: 2023-10-07

## 2023-10-07 LAB
GLUCOSE BLD STRIP.AUTO-MCNC: 237 MG/DL (ref 65–99)
GLUCOSE BLD STRIP.AUTO-MCNC: 264 MG/DL (ref 65–99)

## 2023-10-07 PROCEDURE — 770001 HCHG ROOM/CARE - MED/SURG/GYN PRIV*

## 2023-10-07 PROCEDURE — 73200 CT UPPER EXTREMITY W/O DYE: CPT | Mod: RT

## 2023-10-07 PROCEDURE — G0316 PR PROLONGED IP/OBS E&M EA 15 MIN: HCPCS | Performed by: STUDENT IN AN ORGANIZED HEALTH CARE EDUCATION/TRAINING PROGRAM

## 2023-10-07 PROCEDURE — 700105 HCHG RX REV CODE 258: Performed by: STUDENT IN AN ORGANIZED HEALTH CARE EDUCATION/TRAINING PROGRAM

## 2023-10-07 PROCEDURE — 700102 HCHG RX REV CODE 250 W/ 637 OVERRIDE(OP): Performed by: STUDENT IN AN ORGANIZED HEALTH CARE EDUCATION/TRAINING PROGRAM

## 2023-10-07 PROCEDURE — 82962 GLUCOSE BLOOD TEST: CPT

## 2023-10-07 PROCEDURE — A9270 NON-COVERED ITEM OR SERVICE: HCPCS | Performed by: STUDENT IN AN ORGANIZED HEALTH CARE EDUCATION/TRAINING PROGRAM

## 2023-10-07 PROCEDURE — 99223 1ST HOSP IP/OBS HIGH 75: CPT | Mod: 25,AI | Performed by: STUDENT IN AN ORGANIZED HEALTH CARE EDUCATION/TRAINING PROGRAM

## 2023-10-07 PROCEDURE — 99497 ADVNCD CARE PLAN 30 MIN: CPT | Performed by: STUDENT IN AN ORGANIZED HEALTH CARE EDUCATION/TRAINING PROGRAM

## 2023-10-07 PROCEDURE — 700111 HCHG RX REV CODE 636 W/ 250 OVERRIDE (IP): Mod: JZ | Performed by: STUDENT IN AN ORGANIZED HEALTH CARE EDUCATION/TRAINING PROGRAM

## 2023-10-07 RX ORDER — ONDANSETRON 2 MG/ML
4 INJECTION INTRAMUSCULAR; INTRAVENOUS EVERY 4 HOURS PRN
Status: ACTIVE | OUTPATIENT
Start: 2023-10-07 | End: 2023-10-07

## 2023-10-07 RX ORDER — DEXTROSE MONOHYDRATE 25 G/50ML
25 INJECTION, SOLUTION INTRAVENOUS
Status: DISCONTINUED | OUTPATIENT
Start: 2023-10-07 | End: 2023-10-18 | Stop reason: HOSPADM

## 2023-10-07 RX ORDER — ATORVASTATIN CALCIUM 20 MG/1
40 TABLET, FILM COATED ORAL NIGHTLY
Status: ON HOLD | COMMUNITY
End: 2023-10-18

## 2023-10-07 RX ORDER — METOPROLOL SUCCINATE 50 MG/1
100 TABLET, EXTENDED RELEASE ORAL
Status: DISCONTINUED | OUTPATIENT
Start: 2023-10-07 | End: 2023-10-09

## 2023-10-07 RX ORDER — LISINOPRIL 20 MG/1
20 TABLET ORAL
Status: DISCONTINUED | OUTPATIENT
Start: 2023-10-07 | End: 2023-10-18 | Stop reason: HOSPADM

## 2023-10-07 RX ORDER — INSULIN LISPRO 100 [IU]/ML
0.2 INJECTION, SOLUTION INTRAVENOUS; SUBCUTANEOUS
Status: DISCONTINUED | OUTPATIENT
Start: 2023-10-08 | End: 2023-10-13

## 2023-10-07 RX ORDER — ATORVASTATIN CALCIUM 40 MG/1
40 TABLET, FILM COATED ORAL EVERY EVENING
Status: DISCONTINUED | OUTPATIENT
Start: 2023-10-07 | End: 2023-10-18 | Stop reason: HOSPADM

## 2023-10-07 RX ORDER — KETOROLAC TROMETHAMINE 30 MG/ML
15 INJECTION, SOLUTION INTRAMUSCULAR; INTRAVENOUS EVERY 6 HOURS PRN
Status: DISPENSED | OUTPATIENT
Start: 2023-10-07 | End: 2023-10-12

## 2023-10-07 RX ORDER — MORPHINE SULFATE 4 MG/ML
3 INJECTION INTRAVENOUS EVERY 4 HOURS PRN
Status: DISCONTINUED | OUTPATIENT
Start: 2023-10-07 | End: 2023-10-13

## 2023-10-07 RX ORDER — ENALAPRILAT 1.25 MG/ML
1.25 INJECTION INTRAVENOUS EVERY 6 HOURS PRN
Status: ACTIVE | OUTPATIENT
Start: 2023-10-07 | End: 2023-10-07

## 2023-10-07 RX ORDER — SODIUM CHLORIDE 9 MG/ML
INJECTION, SOLUTION INTRAVENOUS CONTINUOUS
Status: DISCONTINUED | OUTPATIENT
Start: 2023-10-07 | End: 2023-10-08

## 2023-10-07 RX ORDER — ACETAMINOPHEN 325 MG/1
650 TABLET ORAL EVERY 6 HOURS PRN
Status: DISCONTINUED | OUTPATIENT
Start: 2023-10-07 | End: 2023-10-18 | Stop reason: HOSPADM

## 2023-10-07 RX ORDER — FAMOTIDINE 20 MG/1
20 TABLET, FILM COATED ORAL
COMMUNITY

## 2023-10-07 RX ORDER — MAGNESIUM OXIDE 400 MG/1
400 TABLET ORAL DAILY
COMMUNITY

## 2023-10-07 RX ORDER — ACETAMINOPHEN 325 MG/1
650 TABLET ORAL EVERY 6 HOURS PRN
Status: DISCONTINUED | OUTPATIENT
Start: 2023-10-07 | End: 2023-10-07

## 2023-10-07 RX ORDER — LISINOPRIL 20 MG/1
20 TABLET ORAL DAILY
COMMUNITY

## 2023-10-07 RX ORDER — INSULIN LISPRO 100 [IU]/ML
1-6 INJECTION, SOLUTION INTRAVENOUS; SUBCUTANEOUS
Status: DISCONTINUED | OUTPATIENT
Start: 2023-10-07 | End: 2023-10-08

## 2023-10-07 RX ORDER — METOPROLOL SUCCINATE 100 MG/1
100 TABLET, EXTENDED RELEASE ORAL DAILY
COMMUNITY

## 2023-10-07 RX ADMIN — KETOROLAC TROMETHAMINE 15 MG: 30 INJECTION, SOLUTION INTRAMUSCULAR; INTRAVENOUS at 18:05

## 2023-10-07 RX ADMIN — SODIUM CHLORIDE: 9 INJECTION, SOLUTION INTRAVENOUS at 18:25

## 2023-10-07 RX ADMIN — ATORVASTATIN CALCIUM 40 MG: 40 TABLET, FILM COATED ORAL at 18:04

## 2023-10-07 RX ADMIN — METOPROLOL SUCCINATE 100 MG: 50 TABLET, EXTENDED RELEASE ORAL at 18:04

## 2023-10-07 RX ADMIN — MORPHINE SULFATE 3 MG: 4 INJECTION, SOLUTION INTRAMUSCULAR; INTRAVENOUS at 21:04

## 2023-10-07 RX ADMIN — LISINOPRIL 20 MG: 20 TABLET ORAL at 18:04

## 2023-10-07 RX ADMIN — INSULIN LISPRO 2 UNITS: 100 INJECTION, SOLUTION INTRAVENOUS; SUBCUTANEOUS at 20:14

## 2023-10-07 RX ADMIN — INSULIN GLARGINE-YFGN 14 UNITS: 100 INJECTION, SOLUTION SUBCUTANEOUS at 18:20

## 2023-10-07 ASSESSMENT — PAIN DESCRIPTION - PAIN TYPE
TYPE: ACUTE PAIN
TYPE: ACUTE PAIN

## 2023-10-07 ASSESSMENT — ENCOUNTER SYMPTOMS
PSYCHIATRIC NEGATIVE: 1
EYES NEGATIVE: 1
GASTROINTESTINAL NEGATIVE: 1
CARDIOVASCULAR NEGATIVE: 1
FALLS: 1
NEUROLOGICAL NEGATIVE: 1
RESPIRATORY NEGATIVE: 1

## 2023-10-07 NOTE — ASSESSMENT & PLAN NOTE
"10/16/2023  S/p Open treatment with intramedullary nailing, right   intertrochanteric femur fracture.  PT/OT eval  Pain management, on IV narcotics, monitor for toxicity\"    SNF planned  Avoid free water and salt food.  "

## 2023-10-07 NOTE — H&P
Hospital Medicine History & Physical Note    Date of Service  10/7/2023    Primary Care Physician  DALY Pan    Consultants  Orthopedic Surgery    Code Status  Full Code    Chief Complaint  Humeral neck fracture  Femur fracture    History of Presenting Illness  Miley Whittington is a 68 y.o. female who presented a mechanical fall.  Patient was at home and slipped on a wet tile and fell on the right side of her body.  Noticed immediate right upper extremity and right lower extremity pain.  Denies head strike/loss of consciousness.  She then came to outside facility ER for evaluation.    Labs at outside facility:  White blood cell count 16 hemoglobin 14.2 platelet 160  Sodium 137 potassium 4.2 chloride 105 bicarb 26 glucose 243 BUN 12 creatinine 0.74  X-ray pertinent for markedly comminuted and displaced multipart fracture of the right femoral neck and nondisplaced intertrochanteric femur fracture.    Right hand placed in sling, patient transferred as no orthopedic surgery available at outside facility for several days.    I discussed the plan of care with patient.    Review of Systems  Review of Systems   Constitutional:  Positive for malaise/fatigue.   HENT: Negative.     Eyes: Negative.    Respiratory: Negative.     Cardiovascular: Negative.    Gastrointestinal: Negative.    Genitourinary: Negative.    Musculoskeletal:  Positive for falls and joint pain.   Skin: Negative.    Neurological: Negative.    Endo/Heme/Allergies: Negative.    Psychiatric/Behavioral: Negative.         Past Medical History   has a past medical history of Arthritis, COPD (chronic obstructive pulmonary disease) (HCC), Diabetes (HCC), GERD (gastroesophageal reflux disease), Glaucoma, High cholesterol, Hyperlipidemia, Hypertension, and Snoring.    Surgical History   has a past surgical history that includes cholecystectomy; orif, fracture, femur (1/28/2017); and vitrectomy posterior (Right, 6/8/2022).     Family History  family  "history is not on file.   Family history reviewed with patient. There is no family history that is pertinent to the chief complaint.     Social History   reports that she has been smoking cigarettes. She has never used smokeless tobacco. She reports that she does not currently use alcohol. She reports that she does not currently use drugs.    Allergies  Allergies   Allergen Reactions    Metformin Shortness of Breath    Sulfa Drugs Anaphylaxis       Medications  Cannot display prior to admission medications because the patient has not been admitted in this contact.       Physical Exam  BP: ()/()                           Physical Exam  Constitutional:       Appearance: Normal appearance. She is normal weight.   HENT:      Head: Normocephalic.      Nose: Nose normal.      Mouth/Throat:      Mouth: Mucous membranes are moist.   Eyes:      Pupils: Pupils are equal, round, and reactive to light.   Cardiovascular:      Rate and Rhythm: Normal rate and regular rhythm.      Pulses: Normal pulses.   Pulmonary:      Effort: Pulmonary effort is normal.      Breath sounds: Normal breath sounds.   Abdominal:      General: Abdomen is flat. Bowel sounds are normal.      Palpations: Abdomen is soft.   Musculoskeletal:      Cervical back: Neck supple.      Comments: RUE arm in sling  RLE ROM limited due to pain  Neurovascularly intact      Skin:     General: Skin is warm.   Neurological:      Mental Status: She is alert and oriented to person, place, and time. Mental status is at baseline.   Psychiatric:         Mood and Affect: Mood normal.         Behavior: Behavior normal.         Thought Content: Thought content normal.         Judgment: Judgment normal.         Laboratory:          No results for input(s): \"ALTSGPT\", \"ASTSGOT\", \"ALKPHOSPHAT\", \"TBILIRUBIN\", \"DBILIRUBIN\", \"GAMMAGT\", \"AMYLASE\", \"LIPASE\", \"ALB\", \"PREALBUMIN\", \"GLUCOSE\" in the last 72 hours.      No results for input(s): \"NTPROBNP\" in the last 72 hours.      No " "results for input(s): \"TROPONINT\" in the last 72 hours.    Imaging:  [id number] FILM X-RAY   Final Result      [id number] FILM X-RAY   Final Result      OUTSIDE IMAGES-DX CHEST   Final Result          X-Ray:  I have personally reviewed the images and compared with prior images.    Assessment/Plan:  Justification for Admission Status  I anticipate this patient will require at least two midnights for appropriate medical management, necessitating inpatient admission because pt has right side femoral neck fractture    Patient will need a Med/Surg bed on ORTHOPEDICS service .  The need is secondary to femoral neck fracture.    * Femoral neck fracture (HCC)  Assessment & Plan  Chart reviewed from outside facility.  Patient had mechanical fall and found to have markedly comminuted and displaced multipart fracture of the right humeral neck, nondisplaced intertrochanteric femur fracture.  Orthopedic surgery consulted, patient n.p.o. midnight.  PT OT.  Toradol as needed.    ACP (advance care planning)  Assessment & Plan  16 minutes spent discussing goals of care with patient.  She was explained her diagnosis and treatment plan moving forward.  When asked about CODE STATUS, she would like to be full code.    Hyperlipidemia  Assessment & Plan  Continue lipitor    Hypertension  Assessment & Plan  Continue home medications    Diabetes (HCC)  Assessment & Plan  Sliding scale        VTE prophylaxis: pharmacologic prophylaxis contraindicated due to OR in AM     Total time spent on visit 90 minutes reviewing records from outside facility, discussing prognosis, diagnosis, treatment plan, risk and benefits  "

## 2023-10-07 NOTE — ASSESSMENT & PLAN NOTE
"10/16/2023  Continue home medications\"    Vitals:    10/17/23 2133   BP: (!) 146/64   Pulse: 80   Resp: 16   Temp: 36.5 °C (97.7 °F)   SpO2: 95%     Stable  "

## 2023-10-07 NOTE — PROGRESS NOTES
68-year-old female with ground-level fall injuring her  side-->right hip fracture and a right humeral fracture    Needs Ortho consult

## 2023-10-08 ENCOUNTER — APPOINTMENT (OUTPATIENT)
Dept: RADIOLOGY | Facility: MEDICAL CENTER | Age: 68
DRG: 481 | End: 2023-10-08
Attending: ORTHOPAEDIC SURGERY
Payer: MEDICARE

## 2023-10-08 ENCOUNTER — ANESTHESIA (OUTPATIENT)
Dept: SURGERY | Facility: MEDICAL CENTER | Age: 68
DRG: 481 | End: 2023-10-08
Payer: MEDICARE

## 2023-10-08 ENCOUNTER — ANESTHESIA EVENT (OUTPATIENT)
Dept: SURGERY | Facility: MEDICAL CENTER | Age: 68
DRG: 481 | End: 2023-10-08
Payer: MEDICARE

## 2023-10-08 PROBLEM — D72.829 LEUCOCYTOSIS: Status: ACTIVE | Noted: 2023-10-08

## 2023-10-08 PROBLEM — R00.1 BRADYCARDIA: Status: ACTIVE | Noted: 2023-10-08

## 2023-10-08 LAB
ANION GAP SERPL CALC-SCNC: 8 MMOL/L (ref 7–16)
BUN SERPL-MCNC: 14 MG/DL (ref 8–22)
CALCIUM SERPL-MCNC: 8.1 MG/DL (ref 8.5–10.5)
CHLORIDE SERPL-SCNC: 109 MMOL/L (ref 96–112)
CO2 SERPL-SCNC: 24 MMOL/L (ref 20–33)
CREAT SERPL-MCNC: 0.58 MG/DL (ref 0.5–1.4)
EKG IMPRESSION: NORMAL
ERYTHROCYTE [DISTWIDTH] IN BLOOD BY AUTOMATED COUNT: 41.4 FL (ref 35.9–50)
GFR SERPLBLD CREATININE-BSD FMLA CKD-EPI: 97 ML/MIN/1.73 M 2
GLUCOSE BLD STRIP.AUTO-MCNC: 163 MG/DL (ref 65–99)
GLUCOSE BLD STRIP.AUTO-MCNC: 170 MG/DL (ref 65–99)
GLUCOSE BLD STRIP.AUTO-MCNC: 180 MG/DL (ref 65–99)
GLUCOSE BLD STRIP.AUTO-MCNC: 208 MG/DL (ref 65–99)
GLUCOSE BLD STRIP.AUTO-MCNC: 234 MG/DL (ref 65–99)
GLUCOSE BLD STRIP.AUTO-MCNC: 234 MG/DL (ref 65–99)
GLUCOSE SERPL-MCNC: 166 MG/DL (ref 65–99)
HCT VFR BLD AUTO: 37.3 % (ref 37–47)
HGB BLD-MCNC: 12.1 G/DL (ref 12–16)
MCH RBC QN AUTO: 29.1 PG (ref 27–33)
MCHC RBC AUTO-ENTMCNC: 32.4 G/DL (ref 32.2–35.5)
MCV RBC AUTO: 89.7 FL (ref 81.4–97.8)
PLATELET # BLD AUTO: 153 K/UL (ref 164–446)
PMV BLD AUTO: 11.1 FL (ref 9–12.9)
POTASSIUM SERPL-SCNC: 4.6 MMOL/L (ref 3.6–5.5)
PROCALCITONIN SERPL-MCNC: 0.08 NG/ML
RBC # BLD AUTO: 4.16 M/UL (ref 4.2–5.4)
SODIUM SERPL-SCNC: 141 MMOL/L (ref 135–145)
WBC # BLD AUTO: 13.5 K/UL (ref 4.8–10.8)

## 2023-10-08 PROCEDURE — 110371 HCHG SHELL REV 272: Performed by: ORTHOPAEDIC SURGERY

## 2023-10-08 PROCEDURE — 700111 HCHG RX REV CODE 636 W/ 250 OVERRIDE (IP): Mod: JZ | Performed by: STUDENT IN AN ORGANIZED HEALTH CARE EDUCATION/TRAINING PROGRAM

## 2023-10-08 PROCEDURE — 160048 HCHG OR STATISTICAL LEVEL 1-5: Performed by: ORTHOPAEDIC SURGERY

## 2023-10-08 PROCEDURE — 700102 HCHG RX REV CODE 250 W/ 637 OVERRIDE(OP): Performed by: STUDENT IN AN ORGANIZED HEALTH CARE EDUCATION/TRAINING PROGRAM

## 2023-10-08 PROCEDURE — 160035 HCHG PACU - 1ST 60 MINS PHASE I: Performed by: ORTHOPAEDIC SURGERY

## 2023-10-08 PROCEDURE — 160041 HCHG SURGERY MINUTES - EA ADDL 1 MIN LEVEL 4: Performed by: ORTHOPAEDIC SURGERY

## 2023-10-08 PROCEDURE — 36415 COLL VENOUS BLD VENIPUNCTURE: CPT

## 2023-10-08 PROCEDURE — 73502 X-RAY EXAM HIP UNI 2-3 VIEWS: CPT | Mod: RT

## 2023-10-08 PROCEDURE — 93010 ELECTROCARDIOGRAM REPORT: CPT | Performed by: INTERNAL MEDICINE

## 2023-10-08 PROCEDURE — 82962 GLUCOSE BLOOD TEST: CPT | Mod: 91

## 2023-10-08 PROCEDURE — 85027 COMPLETE CBC AUTOMATED: CPT

## 2023-10-08 PROCEDURE — 700101 HCHG RX REV CODE 250: Performed by: STUDENT IN AN ORGANIZED HEALTH CARE EDUCATION/TRAINING PROGRAM

## 2023-10-08 PROCEDURE — 84145 PROCALCITONIN (PCT): CPT

## 2023-10-08 PROCEDURE — A9270 NON-COVERED ITEM OR SERVICE: HCPCS | Performed by: STUDENT IN AN ORGANIZED HEALTH CARE EDUCATION/TRAINING PROGRAM

## 2023-10-08 PROCEDURE — 93005 ELECTROCARDIOGRAM TRACING: CPT | Performed by: ORTHOPAEDIC SURGERY

## 2023-10-08 PROCEDURE — 700105 HCHG RX REV CODE 258: Performed by: ORTHOPAEDIC SURGERY

## 2023-10-08 PROCEDURE — 770001 HCHG ROOM/CARE - MED/SURG/GYN PRIV*

## 2023-10-08 PROCEDURE — 0QS606Z REPOSITION RIGHT UPPER FEMUR WITH INTRAMEDULLARY INTERNAL FIXATION DEVICE, OPEN APPROACH: ICD-10-PCS | Performed by: ORTHOPAEDIC SURGERY

## 2023-10-08 PROCEDURE — 700105 HCHG RX REV CODE 258: Performed by: STUDENT IN AN ORGANIZED HEALTH CARE EDUCATION/TRAINING PROGRAM

## 2023-10-08 PROCEDURE — 502000 HCHG MISC OR IMPLANTS RC 0278: Performed by: ORTHOPAEDIC SURGERY

## 2023-10-08 PROCEDURE — C1713 ANCHOR/SCREW BN/BN,TIS/BN: HCPCS | Performed by: ORTHOPAEDIC SURGERY

## 2023-10-08 PROCEDURE — 160036 HCHG PACU - EA ADDL 30 MINS PHASE I: Performed by: ORTHOPAEDIC SURGERY

## 2023-10-08 PROCEDURE — 80048 BASIC METABOLIC PNL TOTAL CA: CPT

## 2023-10-08 PROCEDURE — 160029 HCHG SURGERY MINUTES - 1ST 30 MINS LEVEL 4: Performed by: ORTHOPAEDIC SURGERY

## 2023-10-08 PROCEDURE — 700111 HCHG RX REV CODE 636 W/ 250 OVERRIDE (IP): Performed by: STUDENT IN AN ORGANIZED HEALTH CARE EDUCATION/TRAINING PROGRAM

## 2023-10-08 PROCEDURE — 502240 HCHG MISC OR SUPPLY RC 0272: Performed by: ORTHOPAEDIC SURGERY

## 2023-10-08 PROCEDURE — 160002 HCHG RECOVERY MINUTES (STAT): Performed by: ORTHOPAEDIC SURGERY

## 2023-10-08 PROCEDURE — 700111 HCHG RX REV CODE 636 W/ 250 OVERRIDE (IP): Performed by: ORTHOPAEDIC SURGERY

## 2023-10-08 PROCEDURE — 99233 SBSQ HOSP IP/OBS HIGH 50: CPT | Performed by: STUDENT IN AN ORGANIZED HEALTH CARE EDUCATION/TRAINING PROGRAM

## 2023-10-08 PROCEDURE — 160009 HCHG ANES TIME/MIN: Performed by: ORTHOPAEDIC SURGERY

## 2023-10-08 DEVICE — K-WIRE 3X285MM: Type: IMPLANTABLE DEVICE | Site: HIP | Status: FUNCTIONAL

## 2023-10-08 DEVICE — LOCKING SCREW DIA 5X35MM: Type: IMPLANTABLE DEVICE | Site: HIP | Status: FUNCTIONAL

## 2023-10-08 RX ORDER — CEFAZOLIN SODIUM 1 G/3ML
INJECTION, POWDER, FOR SOLUTION INTRAMUSCULAR; INTRAVENOUS PRN
Status: DISCONTINUED | OUTPATIENT
Start: 2023-10-08 | End: 2023-10-08 | Stop reason: SURG

## 2023-10-08 RX ORDER — ACETAMINOPHEN 500 MG
1000 TABLET ORAL ONCE
Status: COMPLETED | OUTPATIENT
Start: 2023-10-08 | End: 2023-10-08

## 2023-10-08 RX ORDER — OXYCODONE HCL 5 MG/5 ML
5 SOLUTION, ORAL ORAL
Status: DISCONTINUED | OUTPATIENT
Start: 2023-10-08 | End: 2023-10-08 | Stop reason: HOSPADM

## 2023-10-08 RX ORDER — EPHEDRINE SULFATE 50 MG/ML
5 INJECTION, SOLUTION INTRAVENOUS
Status: DISCONTINUED | OUTPATIENT
Start: 2023-10-08 | End: 2023-10-08 | Stop reason: HOSPADM

## 2023-10-08 RX ORDER — LIDOCAINE HYDROCHLORIDE 20 MG/ML
INJECTION, SOLUTION EPIDURAL; INFILTRATION; INTRACAUDAL; PERINEURAL PRN
Status: DISCONTINUED | OUTPATIENT
Start: 2023-10-08 | End: 2023-10-08 | Stop reason: SURG

## 2023-10-08 RX ORDER — ONDANSETRON 2 MG/ML
4 INJECTION INTRAMUSCULAR; INTRAVENOUS
Status: DISCONTINUED | OUTPATIENT
Start: 2023-10-08 | End: 2023-10-08 | Stop reason: HOSPADM

## 2023-10-08 RX ORDER — SODIUM CHLORIDE, SODIUM LACTATE, POTASSIUM CHLORIDE, CALCIUM CHLORIDE 600; 310; 30; 20 MG/100ML; MG/100ML; MG/100ML; MG/100ML
INJECTION, SOLUTION INTRAVENOUS CONTINUOUS
Status: DISCONTINUED | OUTPATIENT
Start: 2023-10-08 | End: 2023-10-08 | Stop reason: HOSPADM

## 2023-10-08 RX ORDER — HYDROMORPHONE HYDROCHLORIDE 1 MG/ML
0.2 INJECTION, SOLUTION INTRAMUSCULAR; INTRAVENOUS; SUBCUTANEOUS
Status: DISCONTINUED | OUTPATIENT
Start: 2023-10-08 | End: 2023-10-08 | Stop reason: HOSPADM

## 2023-10-08 RX ORDER — PHENYLEPHRINE HCL IN 0.9% NACL 0.5 MG/5ML
SYRINGE (ML) INTRAVENOUS PRN
Status: DISCONTINUED | OUTPATIENT
Start: 2023-10-08 | End: 2023-10-08 | Stop reason: SURG

## 2023-10-08 RX ORDER — ONDANSETRON 2 MG/ML
INJECTION INTRAMUSCULAR; INTRAVENOUS PRN
Status: DISCONTINUED | OUTPATIENT
Start: 2023-10-08 | End: 2023-10-08 | Stop reason: SURG

## 2023-10-08 RX ORDER — OXYCODONE HCL 5 MG/5 ML
10 SOLUTION, ORAL ORAL
Status: DISCONTINUED | OUTPATIENT
Start: 2023-10-08 | End: 2023-10-08 | Stop reason: HOSPADM

## 2023-10-08 RX ORDER — HYDROMORPHONE HYDROCHLORIDE 1 MG/ML
0.1 INJECTION, SOLUTION INTRAMUSCULAR; INTRAVENOUS; SUBCUTANEOUS
Status: DISCONTINUED | OUTPATIENT
Start: 2023-10-08 | End: 2023-10-08 | Stop reason: HOSPADM

## 2023-10-08 RX ORDER — INSULIN LISPRO 100 [IU]/ML
3-14 INJECTION, SOLUTION INTRAVENOUS; SUBCUTANEOUS
Status: DISCONTINUED | OUTPATIENT
Start: 2023-10-08 | End: 2023-10-18 | Stop reason: HOSPADM

## 2023-10-08 RX ORDER — DEXAMETHASONE SODIUM PHOSPHATE 4 MG/ML
INJECTION, SOLUTION INTRA-ARTICULAR; INTRALESIONAL; INTRAMUSCULAR; INTRAVENOUS; SOFT TISSUE PRN
Status: DISCONTINUED | OUTPATIENT
Start: 2023-10-08 | End: 2023-10-08 | Stop reason: SURG

## 2023-10-08 RX ORDER — SODIUM CHLORIDE, SODIUM LACTATE, POTASSIUM CHLORIDE, CALCIUM CHLORIDE 600; 310; 30; 20 MG/100ML; MG/100ML; MG/100ML; MG/100ML
INJECTION, SOLUTION INTRAVENOUS
Status: DISCONTINUED | OUTPATIENT
Start: 2023-10-08 | End: 2023-10-08 | Stop reason: SURG

## 2023-10-08 RX ORDER — KETOROLAC TROMETHAMINE 30 MG/ML
INJECTION, SOLUTION INTRAMUSCULAR; INTRAVENOUS PRN
Status: DISCONTINUED | OUTPATIENT
Start: 2023-10-08 | End: 2023-10-08 | Stop reason: SURG

## 2023-10-08 RX ORDER — ROCURONIUM BROMIDE 10 MG/ML
INJECTION, SOLUTION INTRAVENOUS PRN
Status: DISCONTINUED | OUTPATIENT
Start: 2023-10-08 | End: 2023-10-08 | Stop reason: SURG

## 2023-10-08 RX ORDER — HALOPERIDOL 5 MG/ML
1 INJECTION INTRAMUSCULAR
Status: DISCONTINUED | OUTPATIENT
Start: 2023-10-08 | End: 2023-10-08 | Stop reason: HOSPADM

## 2023-10-08 RX ORDER — HYDRALAZINE HYDROCHLORIDE 20 MG/ML
5 INJECTION INTRAMUSCULAR; INTRAVENOUS
Status: DISCONTINUED | OUTPATIENT
Start: 2023-10-08 | End: 2023-10-08 | Stop reason: HOSPADM

## 2023-10-08 RX ORDER — ENOXAPARIN SODIUM 100 MG/ML
40 INJECTION SUBCUTANEOUS DAILY
Status: DISCONTINUED | OUTPATIENT
Start: 2023-10-08 | End: 2023-10-18 | Stop reason: HOSPADM

## 2023-10-08 RX ORDER — HYDROMORPHONE HYDROCHLORIDE 1 MG/ML
0.5 INJECTION, SOLUTION INTRAMUSCULAR; INTRAVENOUS; SUBCUTANEOUS
Status: DISCONTINUED | OUTPATIENT
Start: 2023-10-08 | End: 2023-10-08 | Stop reason: HOSPADM

## 2023-10-08 RX ORDER — HYDROMORPHONE HYDROCHLORIDE 2 MG/ML
INJECTION, SOLUTION INTRAMUSCULAR; INTRAVENOUS; SUBCUTANEOUS PRN
Status: DISCONTINUED | OUTPATIENT
Start: 2023-10-08 | End: 2023-10-08 | Stop reason: SURG

## 2023-10-08 RX ORDER — EPHEDRINE SULFATE 50 MG/ML
INJECTION, SOLUTION INTRAVENOUS PRN
Status: DISCONTINUED | OUTPATIENT
Start: 2023-10-08 | End: 2023-10-08 | Stop reason: SURG

## 2023-10-08 RX ADMIN — PROPOFOL 120 MG: 10 INJECTION, EMULSION INTRAVENOUS at 08:07

## 2023-10-08 RX ADMIN — LISINOPRIL 20 MG: 20 TABLET ORAL at 05:16

## 2023-10-08 RX ADMIN — INSULIN LISPRO 1 UNITS: 100 INJECTION, SOLUTION INTRAVENOUS; SUBCUTANEOUS at 05:09

## 2023-10-08 RX ADMIN — ENOXAPARIN SODIUM 40 MG: 100 INJECTION SUBCUTANEOUS at 17:58

## 2023-10-08 RX ADMIN — SODIUM CHLORIDE, POTASSIUM CHLORIDE, SODIUM LACTATE AND CALCIUM CHLORIDE: 600; 310; 30; 20 INJECTION, SOLUTION INTRAVENOUS at 07:58

## 2023-10-08 RX ADMIN — INSULIN GLARGINE-YFGN 14 UNITS: 100 INJECTION, SOLUTION SUBCUTANEOUS at 16:28

## 2023-10-08 RX ADMIN — INSULIN LISPRO 4 UNITS: 100 INJECTION, SOLUTION INTRAVENOUS; SUBCUTANEOUS at 16:27

## 2023-10-08 RX ADMIN — Medication 200 MCG: at 08:44

## 2023-10-08 RX ADMIN — INSULIN LISPRO 5 UNITS: 100 INJECTION, SOLUTION INTRAVENOUS; SUBCUTANEOUS at 12:09

## 2023-10-08 RX ADMIN — ROCURONIUM BROMIDE 50 MG: 50 INJECTION, SOLUTION INTRAVENOUS at 08:07

## 2023-10-08 RX ADMIN — MORPHINE SULFATE 3 MG: 4 INJECTION, SOLUTION INTRAMUSCULAR; INTRAVENOUS at 05:15

## 2023-10-08 RX ADMIN — CEFAZOLIN 2 G: 1 INJECTION, POWDER, FOR SOLUTION INTRAMUSCULAR; INTRAVENOUS at 08:07

## 2023-10-08 RX ADMIN — HYDROMORPHONE HYDROCHLORIDE 1 MG: 2 INJECTION INTRAMUSCULAR; INTRAVENOUS; SUBCUTANEOUS at 08:02

## 2023-10-08 RX ADMIN — EPHEDRINE SULFATE 10 MG: 50 INJECTION INTRAVENOUS at 08:23

## 2023-10-08 RX ADMIN — INSULIN LISPRO 5 UNITS: 100 INJECTION, SOLUTION INTRAVENOUS; SUBCUTANEOUS at 16:27

## 2023-10-08 RX ADMIN — LIDOCAINE HYDROCHLORIDE 100 MG: 20 INJECTION, SOLUTION EPIDURAL; INFILTRATION; INTRACAUDAL at 08:07

## 2023-10-08 RX ADMIN — CEFAZOLIN 2 G: 2 INJECTION, POWDER, FOR SOLUTION INTRAMUSCULAR; INTRAVENOUS at 16:14

## 2023-10-08 RX ADMIN — ATORVASTATIN CALCIUM 40 MG: 40 TABLET, FILM COATED ORAL at 17:58

## 2023-10-08 RX ADMIN — ACETAMINOPHEN 1000 MG: 500 TABLET ORAL at 07:13

## 2023-10-08 RX ADMIN — SUGAMMADEX 200 MG: 100 INJECTION, SOLUTION INTRAVENOUS at 08:47

## 2023-10-08 RX ADMIN — INSULIN LISPRO 2 UNITS: 100 INJECTION, SOLUTION INTRAVENOUS; SUBCUTANEOUS at 12:12

## 2023-10-08 RX ADMIN — ONDANSETRON 4 MG: 2 INJECTION INTRAMUSCULAR; INTRAVENOUS at 08:44

## 2023-10-08 RX ADMIN — Medication 200 MCG: at 08:24

## 2023-10-08 RX ADMIN — DEXAMETHASONE SODIUM PHOSPHATE 8 MG: 4 INJECTION INTRA-ARTICULAR; INTRALESIONAL; INTRAMUSCULAR; INTRAVENOUS; SOFT TISSUE at 08:17

## 2023-10-08 RX ADMIN — METOPROLOL SUCCINATE 100 MG: 50 TABLET, EXTENDED RELEASE ORAL at 05:16

## 2023-10-08 RX ADMIN — INSULIN LISPRO 4 UNITS: 100 INJECTION, SOLUTION INTRAVENOUS; SUBCUTANEOUS at 20:25

## 2023-10-08 RX ADMIN — KETOROLAC TROMETHAMINE 15 MG: 30 INJECTION, SOLUTION INTRAMUSCULAR; INTRAVENOUS at 08:44

## 2023-10-08 ASSESSMENT — PAIN DESCRIPTION - PAIN TYPE
TYPE: SURGICAL PAIN
TYPE: SURGICAL PAIN

## 2023-10-08 ASSESSMENT — PAIN SCALES - GENERAL: PAIN_LEVEL: 0

## 2023-10-08 NOTE — OR SURGEON
Immediate Post OP Note    PreOp Diagnosis: Right nondisplaced IT femur fracture      PostOp Diagnosis: same      Procedure(s):  INSERTION, INTRAMEDULLARY LISE, FEMUR, PROXIMAL    Surgeon(s):  Robert Castellano M.D.    Anesthesiologist/Type of Anesthesia:  Anesthesiologist: Rajesh Dukes M.D./General    Surgical Staff:  Circulator: Kennedy Monk R.N.  Scrub Person: Kenneth Chaidez  Radiology Technologist: Segun Murillo    Specimens removed if any:  * No specimens in log *    Estimated Blood Loss: 50cc    Findings: see dictation    Complications: none known    Plan:  --readmit postop  --WBAT RLE  --NWB RUE in sling  --ancef x 2 doses postop  --will need right shoulder arthroplasty at a later date  --PT/OT for mobilization ASAP  --okay to start lovenox or equivalent tomorrow am, continue SCDs        10/8/2023 8:53 AM Robert Castellano M.D.

## 2023-10-08 NOTE — PROGRESS NOTES
Patient A&OX4. Patient on 2l of O2 via NC. 2035 Patient stated she has severe pain when she moves. CT call to take this patient for CT shoulder. Texted on call provider to get stronger pain med for this patient. Morphine ordered and administered. Patient stated it helped a lot for pain.  Patient on npo after midnight. SCDs on. Call light within reach

## 2023-10-08 NOTE — ANESTHESIA PROCEDURE NOTES
Airway    Date/Time: 10/8/2023 8:09 AM    Performed by: Rajesh Dukes M.D.  Authorized by: Rajesh Dukes M.D.    Location:  OR  Urgency:  Elective  Indications for Airway Management:  Anesthesia      Spontaneous Ventilation: absent    Sedation Level:  Deep  Preoxygenated: Yes    Patient Position:  Sniffing  Mask Difficulty Assessment:  0 - not attempted  Final Airway Type:  Endotracheal airway  Final Endotracheal Airway:  ETT  Cuffed: Yes    Technique Used for Successful ETT Placement:  Direct laryngoscopy    Insertion Site:  Oral  Blade Type:  Saunders  Laryngoscope Blade/Videolaryngoscope Blade Size:  2  ETT Size (mm):  7.0  Measured from:  Teeth  ETT to Teeth (cm):  19  Placement Verified by: auscultation and capnometry    Cormack-Lehane Classification:  Grade I - full view of glottis  Number of Attempts at Approach:  1

## 2023-10-08 NOTE — PROGRESS NOTES
Pt arrived to the floor via Care flight. Pt A&Ox4, 2L via NC and saturation >94%. Consent to treat signed by patient. RTOC informed of patient arrival. Pt oriented to room and NPO order at this time. Med red completed with patient. All questions answered. Pt voiding via purewick. Fall precautions in place. Pt verbalizes pain control at this time. Skin assessment not completed at this time.    1900 Bedside given to NOC RN. All questions answered.

## 2023-10-08 NOTE — ANESTHESIA TIME REPORT
Anesthesia Start and Stop Event Times     Date Time Event    10/8/2023 0749 Ready for Procedure     0758 Anesthesia Start     0858 Anesthesia Stop        Responsible Staff  10/08/23    Name Role Begin End    Rajesh Dukes M.D. Anesth 0758 0858        Overtime Reason:  no overtime (within assigned shift)    Comments:

## 2023-10-08 NOTE — PROGRESS NOTES
Received report and accepted care of patient.  Patient currently off unit in surgery.  Plan of care to include post operative care per MD orders, ACHS monitoring and treatment, pain management and assistance with ADL's as needed.  Will continue to update notes/plan of care as needed throughout shift.

## 2023-10-08 NOTE — ASSESSMENT & PLAN NOTE
"10/16/2023  Likely stress-induced  No concern of ongoing infection  Denies cough, dysuria remained afebrile  Urinalysis not concerning for infection  Chest x-ray unremarkable, procalcitonin negative x2    Perioperative vancomycin and cefazolin per Ortho\"    Leukocytosis trending down  "

## 2023-10-08 NOTE — ANESTHESIA PREPROCEDURE EVALUATION
Case: [id number] Date/Time: 10/08/23 0745    Procedure: INSERTION, INTRAMEDULLARY LISE, FEMUR, PROXIMAL (Right: Hip)    Anesthesia type: General    Location: [place] [address] / SURGERY [facility]    Surgeons: Robert Castellano M.D.      68F with pmh of COPD, HTN, DM2 who presents after fall with hip fracture    Daily smoker  Denies drinking  Denies drug use  Denies problems with prior anesthetic    Relevant Problems   PULMONARY   (positive) COPD (chronic obstructive pulmonary disease) ([facility])      CARDIAC   (positive) Hypertension         (positive) JEN (acute kidney injury) ([facility])       Physical Exam    Airway   Mallampati: III  TM distance: >3 FB  Neck ROM: full       Cardiovascular - normal exam  Rhythm: regular  Rate: normal  (-) murmur     Dental       Very poor dentition   Pulmonary - normal exam  Breath sounds clear to auscultation     Abdominal    Neurological - normal exam                 Anesthesia Plan    ASA 2- EMERGENT   ASA physical status emergent criteria: displaced fracture with possible neurovascular compromise    Plan - general       Airway plan will be ETT          Induction: intravenous    Postoperative Plan: Postoperative administration of opioids is intended.    Pertinent diagnostic labs and testing reviewed    Informed Consent:    Anesthetic plan and risks discussed with patient.

## 2023-10-08 NOTE — THERAPY
Physical Therapy Contact Note    Patient Name: Miley Whittington  Age:  68 y.o., Sex:  female  Medical Record #: [id number]  Today's Date: 10/8/2023       10/08/23 0807   Treatment Variance   Reason For Missed Therapy Medical - Patient  in Procedure   Interdisciplinary Plan of Care Collaboration   Collaboration Comments PT orders received, chart reviewed. Pt currently in surgery for femur fx. Hold PT. Will monitor EMR and eval pt when appropriate.   Session Information   Date / Session Number  10/8 hold (eval)

## 2023-10-08 NOTE — PROGRESS NOTES
4 Eyes Skin Assessment Completed by Luna RN and JT Segura.    Head WDL  Ears WDL  Nose WDL  Mouth WDL  Neck WDL  Breast/Chest WDL  Shoulder Blades: Patient refused to turn  Spine: Patient refused to turn  (R) Arm/Elbow/Hand: YESSICA, in splint, to much pain to move  (L) Arm/Elbow/Hand WDL  Abdomen: scars in pannus area  Groin: scattered Scars  Scrotum/Coccyx/Buttocks: Patient refused to turn  (R) Leg: Discoloration and scars  (L) Leg: Discoloration and scars  (R) Heel/Foot/Toe: Discoloration and scars, nail are long and thick  (L) Heel/Foot/Toe: Discoloration and scars, toe nails are long and thick.           Devices In Places Blood Pressure Cuff, Pulse Ox, and Nasal Cannula      Interventions In Place Gray Ear Foams and ZFlo Pillow    Possible Skin Injury No    Pictures Uploaded Into Epic N/A  Wound Consult Placed N/A  RN Wound Prevention Protocol Ordered Yes

## 2023-10-08 NOTE — CONSULTS
DATE OF SERVICE:  10/07/2023     ORTHOPEDIC CONSULTATION     REQUESTING PHYSICIAN:  Cruz Sawant MD, hospitalist service.     REASON FOR CONSULTATION:  Right proximal humerus fracture, right   intertrochanteric femur fracture.     CHIEF COMPLAINT:  Right shoulder and right hip pain.     HISTORY OF PRESENT ILLNESS:  The patient is 68 years old.  She was transferred   from an outside facility after she slipped on a wet tile at home injuring her   right shoulder and right hip.  She was found to have a right   intertrochanteric femur fracture, right proximal humerus fracture at an   outside facility and she was transferred to [facility] and directly admitted to   the hospitalist service.  I was consulted to provide treatment recommendations   for orthopedic injuries.  She does have elevated glucose levels and history   of diabetes.  She denies numbness or paresthesias in her extremities.     PAST MEDICAL HISTORY:  ALLERGIES:  METFORMIN, SULFA DRUGS.     OUTPATIENT MEDICATIONS:  Semaglutide insulin, gabapentin, insulin, Tylenol,   multivitamin, Prinivil, Spiriva, vitamin D, fluticasone, amlodipine, naproxen,   Prilosec, atorvastatin, metoprolol, latanoprost, spironolactone,   carboxymethylcellulose, lisinopril, magnesium oxide, metoprolol SR and   famotidine     PAST MEDICAL DIAGNOSES:  Include COPD, diabetes, glaucoma, gastroesophageal   reflux disease, high cholesterol, hyperlipidemia and hypertension.     PAST SURGICAL HISTORY:  Cholecystectomy, right distal femur fracture treated   in 2017 by Dr. Tenorio and right posterior vitrectomy in 2022.     SOCIAL HISTORY:  The patient does smoke cigarettes, about 6-8 a day.  Denies   alcohol and illicit drug use.     PHYSICAL EXAMINATION:    VITAL SIGNS:  Temperature 97.3, heart rate 71, respiratory rate 16, blood   pressure 139/79 and pulse oximetry 97% on 2 liters nasal cannula.  GENERAL APPEARANCE:  The patient is alert, pleasant, cooperative, in no acute    distress.  MUSCULOSKELETAL:  Right upper extremity, there is swelling present.  She has   sensation intact to light touch in axillary, median, radial and ulnar nerve   distributions.  She is nontender at the elbow.  She has normal motor function   with AIN, PIN, median, radial and ulnar nerve distribution with palpable   radial pulse.  The right lower extremity, she has some pain with log roll to   the right hip area.  She is nontender at the right knee.  There is no knee   effusion present.  She is able dorsi and plantarflex the foot and flex the   toes.  She does have dystrophic toenails.  There is no evidence of obvious   traumatic deformity of left lower or left upper extremities, which are grossly   neurovascularly intact.     DIAGNOSTIC IMAGING:  Plain x-rays of the right shoulder show displaced   surgical neck proximal humerus fracture.  Plain x-rays of right hip and pelvis   reveal a nondisplaced right intertrochanteric femur fracture.     ASSESSMENT:  The patient is a 68-year-old female with multiple medical   comorbidities including diabetes.  She has a right nondisplaced   intertrochanteric femur fracture and right displaced proximal humerus   fracture.  I discussed findings with the patient.     RECOMMENDATIONS:  1.  I recommend surgical fixation of right intertrochanteric femur fracture.  2.  With regard to her right proximal humerus fracture, I did review her CT   imaging, which shows significant comminution and concern for full-thickness   rotator cuff pathology as well as a fracture through the relative anatomic   neck of the humerus.  I feel that this fracture pattern and appearance of her   bone quality that she would benefit more from a shoulder arthroplasty as   opposed to surgical fixation, which is felt to have a higher likelihood of   failure.  3.  I feel that the first priority would be fixation of her femur fracture and   then better optimization of her glycemic control and diabetes and  can   consider shoulder arthroplasty during this hospitalization if she is felt to   be medically optimized or we can postpone a little while if necessary as well   but given the combination of injuries, I feel that surgical management would   be beneficial for both to help facilitate her most effective rehabilitation.  4.  We did discuss the risks, benefits and alternatives to surgery for her hip   fracture.  She expressed understanding and wished to proceed with surgery   when possible.  5.  Recommend she be made n.p.o. after midnight and I will make preparations   to get her to the operating room for fixation of her right proximal femur   fracture tomorrow.        ______________________________  MD APURVA Valverde/RENA    DD:  10/07/2023 22:51  DT:  10/07/2023 23:09    Job#:  [id number]

## 2023-10-08 NOTE — ANESTHESIA POSTPROCEDURE EVALUATION
Patient: Miley Whittington    Procedure Summary     Date: 10/08/23 Room / Location: [place] [address] / SURGERY [facility]    Anesthesia Start: 0758 Anesthesia Stop: 0858    Procedure: INSERTION, INTRAMEDULLARY LISE, FEMUR, PROXIMAL (Right: Hip) Diagnosis: (Right nondisplaced IT femur fracture)    Surgeons: Robert Castellano M.D. Responsible Provider: Rajesh Dukes M.D.    Anesthesia Type: general ASA Status: 2 - Emergent          Final Anesthesia Type: general  Last vitals  BP   Blood Pressure : 133/63    Temp   36.5 °C (97.7 °F)    Pulse   60   Resp   15    SpO2   96 %      Anesthesia Post Evaluation    Patient location during evaluation: PACU  Patient participation: complete - patient participated  Level of consciousness: awake and alert  Pain score: 0    Airway patency: patent  Anesthetic complications: no  Cardiovascular status: hemodynamically stable  Respiratory status: acceptable  Hydration status: euvolemic    PONV: none          No notable events documented.     Nurse Pain Score: 0 (NPRS)

## 2023-10-08 NOTE — OP REPORT
DATE OF SERVICE:  10/08/2023     PREOPERATIVE DIAGNOSIS:  Right nondisplaced intertrochanteric femur fracture.     POSTOPERATIVE DIAGNOSIS:  Right nondisplaced intertrochanteric femur fracture.     PROCEDURE PERFORMED:  Open treatment with intramedullary nailing, right   intertrochanteric femur fracture.     SURGEON:  Robert Castellano MD     ANESTHESIOLOGIST:  Rajesh Dukes MD     ANESTHESIA:  General.     ESTIMATED BLOOD LOSS:  50 mL.     IMPLANTS:  [place] 10x170 mm 125-degree Gamma4 nail with a 90 mm lag screw and   a single 5.0 mm distal interlocking screw.     INDICATIONS FOR PROCEDURE:  The patient had a fall.  She was transferred from   an outside facility with a right nondisplaced intertrochanteric femur fracture   as well as a right displaced proximal humerus fracture.  CT imaging of the   shoulder suggest significant displacement and comminution and potential   full-thickness rotator cuff pathology, so I felt she would benefit from   surgical management, but likely shoulder arthroplasty.  Prior to today was   fixation of her right nondisplaced intertrochanteric femur fracture to help   facilitate early mobilization.  She signed informed consent and wished to   proceed with surgical fixation of right intertrochanteric femur fracture   today.     DESCRIPTION OF PROCEDURE:  The patient was met in the preoperative holding   area.  Her surgical site was signed.  Her consent was confirmed to be   accurate.  She was taken back to the operating room.  General anesthesia was   induced.  Ancef was administered.  She was placed on the fracture table in   supine position.  A small amount of traction was applied to the right lower   extremity across the perineal post.  The left lower extremity was suspended in   extension.  The right thigh was provisionally cleansed with isopropyl alcohol   and then prepped and draped in the usual sterile fashion.  A formal timeout   was performed to confirm patient's correct name,  Visit Vitals    BP 92/58    Pulse 100    Resp 20    Ht 5' 7\" (1.702 m)    Wt 251 lb (113.9 kg)    SpO2 97%    BMI 39.31 kg/m2   Denies CP,SOB,Dizziness or edema    C/o some weakness and sleeps with 2 pillows. correct surgical site,   correct procedure and correct laterality.  Fluoroscopic guidance confirmed a   nondisplaced alignment of the fracture on AP and lateral views.  A   percutaneous starting point at the tip of the greater trochanter was obtained   with guide pin and then I incised the skin over the guide pin.  I used the   entry reamer to enter the proximal femur.  The guide pin position had been   confirmed to be acceptable using AP and lateral fluoroscopic imaging prior to   reaming.  I then inserted a 10x170 mm 125-degree Gamma4 nail with appropriate   depth.  Using the insertion instrumentation and counter incision, I inserted a   guide pin to achieve an appropriate tip to apex distance, confirmed on AP and   lateral fluoroscopic imaging slightly posterior on the lateral view, but   overall acceptable.  I prepared for and inserted 90 mm lag screw, set the set   screw for the dynamized position, medialized the nail slightly and placed one   lateral to medial interlocking screw distally using the insertion   instrumentation.  All insertion instrumentation was removed and final   fluoroscopic imaging confirmed overall acceptable alignment of the fracture   and acceptable position of the implants.  The wounds were thoroughly irrigated   with normal saline.  I repaired the subcutaneous tissue layers with Vicryl   suture and the skin edges with staples.  The wounds were thoroughly cleansed,   dried and sterile occlusive dressings were applied.  She was then awoken from   anesthesia, [place] and taken to postanesthesia care unit in   stable condition.     PLAN:  1.  The patient will be readmitted to medical service postop.  2.  She should be non-weightbearing on the right upper extremity with a sling.  3.  She can weightbear as tolerated on the right lower extremity.  4.  She should work with physical and occupational therapy for mobility and   gait training.  5.  She will need Ancef for 2 doses  postop for infection prophylaxis.  6.  I do anticipate the need for right shoulder arthroplasty, specifically   reverse shoulder arthroplasty at a later date during his hospitalization   depending on medical optimization of her underlying diabetes.  7.  She can be started on Lovenox or equivalent tomorrow morning and should   continue SCDs in the meantime.        ______________________________  MD APURVA Valverde/SUNDEEP    DD:  10/08/2023 08:59  DT:  10/08/2023 09:28    Job#:  [id number]

## 2023-10-08 NOTE — PROGRESS NOTES
Hospital Medicine Daily Progress Note    Date of Service  10/8/2023    Chief Complaint  Miley Whittington is a 68 y.o. female admitted 10/7/2023 with ground-level fall  Hospital Course  68 female with past medical history of COPD, hypertension, hyperlipidemia presented after ground-level fall subsequent x-ray noted with comminuted and displaced multipart fracture of the right femoral neck and nondisplaced intertrochanteric femur fracture.  S/p Open treatment with intramedullary nailing, right   intertrochanteric femur fracture.  Interval Problem Update    10/8/2023  Vitals been stable  Labs reviewed, noted leukocytosis, BMP unremarkable  Imaging reviewed  Meds reviewed, on Lantus, Lipitor, lisinopril, metoprolol    S/p Open treatment with intramedullary nailing, right   intertrochanteric femur fracture.  PT/OT eval  Pain management, on IV narcotics, monitor for toxicity  Hold metoprolol for bradycardia  Repeat CBC in a.m. to monitor white count and hemoglobin       I have discussed this patient's plan of care and discharge plan at IDT rounds today with Case Management, Nursing, Nursing leadership, and other members of the IDT team.    Consultants/Specialty  orthopedics    Code Status  Full Code    Disposition  The patient is not medically cleared for discharge to home or a post-acute facility.  Anticipate discharge to: skilled nursing facility    I have placed the appropriate orders for post-discharge needs.    Review of Systems  Review of Systems   Musculoskeletal:  Positive for joint pain.        Physical Exam  Temp:  [36.3 °C (97.3 °F)-36.6 °C (97.8 °F)] 36.6 °C (97.8 °F)  Pulse:  [59-71] 59  Resp:  [15-18] 15  BP: (109-165)/(57-83) 109/57  SpO2:  [95 %-98 %] 98 %    Physical Exam  Musculoskeletal:      Comments: Range of motion limited in right hip due to pain         Fluids    Intake/Output Summary (Last 24 hours) at 10/8/2023 0931  Last data filed at 10/8/2023 0858  Gross per 24 hour   Intake 500 ml   Output  150 ml   Net 350 ml       Laboratory  Recent Labs     10/08/23  0233   WBC 13.5*   RBC 4.16*   HEMOGLOBIN 12.1   HEMATOCRIT 37.3   MCV 89.7   MCH 29.1   MCHC 32.4   RDW 41.4   PLATELETCT 153*   MPV 11.1     Recent Labs     10/08/23  0233   SODIUM 141   POTASSIUM 4.6   CHLORIDE 109   CO2 24   GLUCOSE 166*   BUN 14   CREATININE 0.58   CALCIUM 8.1*                   Imaging  CT-SHOULDER W/O PLUS RECONS RIGHT   Final Result      1.  Comminuted humeral neck fracture is identified with significant impaction and displacement at the fracture site as described above.      2.  No evidence of dislocation at the glenohumeral joint.      3.  No other fracture or malalignment is identified.      [id number] FILM X-RAY   Final Result      [id number] FILM X-RAY   Final Result      OUTSIDE IMAGES-DX CHEST   Final Result      DX-PORTABLE FLUOROSCOPY < 1 HOUR    (Results Pending)   DX-HIP-UNILATERAL-W/O PELVIS-2/3 VIEWS RIGHT    (Results Pending)        Assessment/Plan  * Femoral neck fracture (HCC)  Assessment & Plan  S/p Open treatment with intramedullary nailing, right   intertrochanteric femur fracture.  PT/OT eval  Pain management, on IV narcotics, monitor for toxicity    Bradycardia  Assessment & Plan  Asymptomatic    Hold metoprolol for bradycardia    Leucocytosis  Assessment & Plan  Likely stress-induced  No concern of ongoing infection  Check pro calcitonin      ACP (advance care planning)  Assessment & Plan  16 minutes spent discussing goals of care with patient.  She was explained her diagnosis and treatment plan moving forward.  When asked about CODE STATUS, she would like to be full code.    Hyperlipidemia  Assessment & Plan  Continue lipitor    Hypertension  Assessment & Plan  Continue home medications    Diabetes (HCC)  Assessment & Plan  Sliding scale         VTE prophylaxis: lovenox    I have performed a physical exam and reviewed and updated ROS and Plan today (10/8/2023). In review of yesterday's note  (10/7/2023), there are no changes except as documented above.       Greater than 51 minutes spent preparing to see patient (e.g. review of tests) obtaining and/or reviewing separately obtained history. Performing a medically appropriate examination and/ evaluation.  Counseling and educating the patient/family/caregiver.  Ordering medications, tests, or procedures.  Referring and communicating with other health care professionals.  Documenting clinical information in EPIC.  Independently interpreting results and communicating results to patient/family/caregiver.  Care coordination.

## 2023-10-08 NOTE — CARE PLAN
The patient is Stable - Low risk of patient condition declining or worsening    Shift Goals  Clinical Goals: surgery, pain control  Patient Goals: pain control  Family Goals: NA    Progress made toward(s) clinical / shift goals:    Problem: Pain - Standard  Goal: Alleviation of pain or a reduction in pain to the patient’s comfort goal  Outcome: Progressing   Morphine worked for severe pain.    Problem: Fall Risk  Goal: Patient will remain free from falls  Outcome: Progressing       Patient is not progressing towards the following goals:

## 2023-10-09 ENCOUNTER — APPOINTMENT (OUTPATIENT)
Dept: RADIOLOGY | Facility: MEDICAL CENTER | Age: 68
DRG: 481 | End: 2023-10-09
Attending: STUDENT IN AN ORGANIZED HEALTH CARE EDUCATION/TRAINING PROGRAM
Payer: MEDICARE

## 2023-10-09 LAB
ANION GAP SERPL CALC-SCNC: 9 MMOL/L (ref 7–16)
BASOPHILS # BLD AUTO: 0.2 % (ref 0–1.8)
BASOPHILS # BLD: 0.03 K/UL (ref 0–0.12)
BUN SERPL-MCNC: 19 MG/DL (ref 8–22)
CALCIUM SERPL-MCNC: 8.8 MG/DL (ref 8.5–10.5)
CHLORIDE SERPL-SCNC: 108 MMOL/L (ref 96–112)
CO2 SERPL-SCNC: 23 MMOL/L (ref 20–33)
CREAT SERPL-MCNC: 0.76 MG/DL (ref 0.5–1.4)
EOSINOPHIL # BLD AUTO: 0 K/UL (ref 0–0.51)
EOSINOPHIL NFR BLD: 0 % (ref 0–6.9)
ERYTHROCYTE [DISTWIDTH] IN BLOOD BY AUTOMATED COUNT: 41.3 FL (ref 35.9–50)
GFR SERPLBLD CREATININE-BSD FMLA CKD-EPI: 84 ML/MIN/1.73 M 2
GLUCOSE BLD STRIP.AUTO-MCNC: 145 MG/DL (ref 65–99)
GLUCOSE BLD STRIP.AUTO-MCNC: 152 MG/DL (ref 65–99)
GLUCOSE BLD STRIP.AUTO-MCNC: 188 MG/DL (ref 65–99)
GLUCOSE BLD STRIP.AUTO-MCNC: 273 MG/DL (ref 65–99)
GLUCOSE SERPL-MCNC: 165 MG/DL (ref 65–99)
HCT VFR BLD AUTO: 33.3 % (ref 37–47)
HGB BLD-MCNC: 10.7 G/DL (ref 12–16)
IMM GRANULOCYTES # BLD AUTO: 0.18 K/UL (ref 0–0.11)
IMM GRANULOCYTES NFR BLD AUTO: 1.1 % (ref 0–0.9)
LYMPHOCYTES # BLD AUTO: 1.87 K/UL (ref 1–4.8)
LYMPHOCYTES NFR BLD: 11.4 % (ref 22–41)
MCH RBC QN AUTO: 28.7 PG (ref 27–33)
MCHC RBC AUTO-ENTMCNC: 32.1 G/DL (ref 32.2–35.5)
MCV RBC AUTO: 89.3 FL (ref 81.4–97.8)
MONOCYTES # BLD AUTO: 1.36 K/UL (ref 0–0.85)
MONOCYTES NFR BLD AUTO: 8.3 % (ref 0–13.4)
NEUTROPHILS # BLD AUTO: 12.97 K/UL (ref 1.82–7.42)
NEUTROPHILS NFR BLD: 79 % (ref 44–72)
NRBC # BLD AUTO: 0 K/UL
NRBC BLD-RTO: 0 /100 WBC (ref 0–0.2)
PLATELET # BLD AUTO: 151 K/UL (ref 164–446)
PMV BLD AUTO: 11.3 FL (ref 9–12.9)
POTASSIUM SERPL-SCNC: 5.2 MMOL/L (ref 3.6–5.5)
RBC # BLD AUTO: 3.73 M/UL (ref 4.2–5.4)
SODIUM SERPL-SCNC: 140 MMOL/L (ref 135–145)
WBC # BLD AUTO: 16.4 K/UL (ref 4.8–10.8)

## 2023-10-09 PROCEDURE — 80048 BASIC METABOLIC PNL TOTAL CA: CPT

## 2023-10-09 PROCEDURE — 97535 SELF CARE MNGMENT TRAINING: CPT

## 2023-10-09 PROCEDURE — 97163 PT EVAL HIGH COMPLEX 45 MIN: CPT

## 2023-10-09 PROCEDURE — 700111 HCHG RX REV CODE 636 W/ 250 OVERRIDE (IP): Mod: JZ | Performed by: STUDENT IN AN ORGANIZED HEALTH CARE EDUCATION/TRAINING PROGRAM

## 2023-10-09 PROCEDURE — 85025 COMPLETE CBC W/AUTO DIFF WBC: CPT

## 2023-10-09 PROCEDURE — 770001 HCHG ROOM/CARE - MED/SURG/GYN PRIV*

## 2023-10-09 PROCEDURE — 82962 GLUCOSE BLOOD TEST: CPT | Mod: 91

## 2023-10-09 PROCEDURE — 99232 SBSQ HOSP IP/OBS MODERATE 35: CPT | Performed by: STUDENT IN AN ORGANIZED HEALTH CARE EDUCATION/TRAINING PROGRAM

## 2023-10-09 PROCEDURE — A9270 NON-COVERED ITEM OR SERVICE: HCPCS | Performed by: STUDENT IN AN ORGANIZED HEALTH CARE EDUCATION/TRAINING PROGRAM

## 2023-10-09 PROCEDURE — 700102 HCHG RX REV CODE 250 W/ 637 OVERRIDE(OP): Performed by: STUDENT IN AN ORGANIZED HEALTH CARE EDUCATION/TRAINING PROGRAM

## 2023-10-09 PROCEDURE — 700111 HCHG RX REV CODE 636 W/ 250 OVERRIDE (IP): Performed by: ORTHOPAEDIC SURGERY

## 2023-10-09 PROCEDURE — 71045 X-RAY EXAM CHEST 1 VIEW: CPT

## 2023-10-09 PROCEDURE — 36415 COLL VENOUS BLD VENIPUNCTURE: CPT

## 2023-10-09 PROCEDURE — 700111 HCHG RX REV CODE 636 W/ 250 OVERRIDE (IP): Performed by: STUDENT IN AN ORGANIZED HEALTH CARE EDUCATION/TRAINING PROGRAM

## 2023-10-09 PROCEDURE — 97165 OT EVAL LOW COMPLEX 30 MIN: CPT

## 2023-10-09 PROCEDURE — 700105 HCHG RX REV CODE 258: Performed by: ORTHOPAEDIC SURGERY

## 2023-10-09 RX ORDER — METOPROLOL SUCCINATE 50 MG/1
50 TABLET, EXTENDED RELEASE ORAL
Status: DISCONTINUED | OUTPATIENT
Start: 2023-10-09 | End: 2023-10-18 | Stop reason: HOSPADM

## 2023-10-09 RX ADMIN — INSULIN LISPRO 5 UNITS: 100 INJECTION, SOLUTION INTRAVENOUS; SUBCUTANEOUS at 16:57

## 2023-10-09 RX ADMIN — MORPHINE SULFATE 3 MG: 4 INJECTION, SOLUTION INTRAMUSCULAR; INTRAVENOUS at 15:53

## 2023-10-09 RX ADMIN — LISINOPRIL 20 MG: 20 TABLET ORAL at 05:31

## 2023-10-09 RX ADMIN — INSULIN LISPRO 3 UNITS: 100 INJECTION, SOLUTION INTRAVENOUS; SUBCUTANEOUS at 11:58

## 2023-10-09 RX ADMIN — INSULIN GLARGINE-YFGN 14 UNITS: 100 INJECTION, SOLUTION SUBCUTANEOUS at 16:56

## 2023-10-09 RX ADMIN — INSULIN LISPRO 7 UNITS: 100 INJECTION, SOLUTION INTRAVENOUS; SUBCUTANEOUS at 20:30

## 2023-10-09 RX ADMIN — ENOXAPARIN SODIUM 40 MG: 100 INJECTION SUBCUTANEOUS at 17:05

## 2023-10-09 RX ADMIN — INSULIN LISPRO 5 UNITS: 100 INJECTION, SOLUTION INTRAVENOUS; SUBCUTANEOUS at 06:08

## 2023-10-09 RX ADMIN — ATORVASTATIN CALCIUM 40 MG: 40 TABLET, FILM COATED ORAL at 17:05

## 2023-10-09 RX ADMIN — KETOROLAC TROMETHAMINE 15 MG: 30 INJECTION, SOLUTION INTRAMUSCULAR; INTRAVENOUS at 11:52

## 2023-10-09 RX ADMIN — CEFAZOLIN 2 G: 2 INJECTION, POWDER, FOR SOLUTION INTRAMUSCULAR; INTRAVENOUS at 01:13

## 2023-10-09 RX ADMIN — INSULIN LISPRO 5 UNITS: 100 INJECTION, SOLUTION INTRAVENOUS; SUBCUTANEOUS at 11:58

## 2023-10-09 RX ADMIN — METOPROLOL SUCCINATE 50 MG: 50 TABLET, EXTENDED RELEASE ORAL at 17:05

## 2023-10-09 RX ADMIN — INSULIN LISPRO 3 UNITS: 100 INJECTION, SOLUTION INTRAVENOUS; SUBCUTANEOUS at 16:57

## 2023-10-09 ASSESSMENT — COGNITIVE AND FUNCTIONAL STATUS - GENERAL
PERSONAL GROOMING: A LITTLE
DRESSING REGULAR UPPER BODY CLOTHING: A LOT
STANDING UP FROM CHAIR USING ARMS: TOTAL
DAILY ACTIVITIY SCORE: 14
HELP NEEDED FOR BATHING: A LOT
SUGGESTED CMS G CODE MODIFIER DAILY ACTIVITY: CK
DRESSING REGULAR LOWER BODY CLOTHING: A LOT
WALKING IN HOSPITAL ROOM: TOTAL
SUGGESTED CMS G CODE MODIFIER MOBILITY: CN
MOVING FROM LYING ON BACK TO SITTING ON SIDE OF FLAT BED: UNABLE
TURNING FROM BACK TO SIDE WHILE IN FLAT BAD: UNABLE
MOVING TO AND FROM BED TO CHAIR: UNABLE
TOILETING: A LOT
EATING MEALS: A LITTLE
MOBILITY SCORE: 6
CLIMB 3 TO 5 STEPS WITH RAILING: TOTAL

## 2023-10-09 ASSESSMENT — GAIT ASSESSMENTS: GAIT LEVEL OF ASSIST: UNABLE TO PARTICIPATE

## 2023-10-09 ASSESSMENT — ACTIVITIES OF DAILY LIVING (ADL): TOILETING: INDEPENDENT

## 2023-10-09 NOTE — PROGRESS NOTES
Hospital Medicine Daily Progress Note    Date of Service  10/9/2023    Chief Complaint  Miley Whittington is a 68 y.o. female admitted 10/7/2023 with ground-level fall  Hospital Course  68 female with past medical history of COPD, hypertension, hyperlipidemia presented after ground-level fall subsequent x-ray noted with comminuted and displaced multipart fracture of the right femoral neck and nondisplaced intertrochanteric femur fracture.  S/p Open treatment with intramedullary nailing, right   intertrochanteric femur fracture.  Interval Problem Update      10/9/2023  Remained stable, bradycardia resolved  On room air saturating over 90%  Labs reviewed noted with worsening leukocytosis, negative procalcitonin  Chest x-ray reviewed unremarkable  Meds reviewed, on Lantus, Lipitor, lisinopril, metoprolol    PT/OT eval, need postacute placement  Pain management, on IV narcotics, monitor for toxicity  Decrease metoprolol to 50 mg  Repeat CBC in a.m. to monitor white count and hemoglobin       I have discussed this patient's plan of care and discharge plan at IDT rounds today with Case Management, Nursing, Nursing leadership, and other members of the IDT team.    Consultants/Specialty  orthopedics    Code Status  Full Code    Disposition  The patient is medically cleared for discharge to home or a post-acute facility.  Anticipate discharge to: skilled nursing facility    I have placed the appropriate orders for post-discharge needs.    Review of Systems  Review of Systems   Musculoskeletal:  Positive for joint pain.        Physical Exam  Temp:  [36.4 °C (97.5 °F)-36.8 °C (98.2 °F)] 36.6 °C (97.9 °F)  Pulse:  [59-76] 76  Resp:  [16-18] 17  BP: (116-141)/(61-74) 116/68  SpO2:  [88 %-98 %] 92 %    Physical Exam  Musculoskeletal:      Comments: Range of motion limited in right hip due to pain         Fluids    Intake/Output Summary (Last 24 hours) at 10/9/2023 1512  Last data filed at 10/8/2023 2000  Gross per 24 hour    Intake --   Output 200 ml   Net -200 ml         Laboratory  Recent Labs     10/08/23  0233 10/09/23  0202   WBC 13.5* 16.4*   RBC 4.16* 3.73*   HEMOGLOBIN 12.1 10.7*   HEMATOCRIT 37.3 33.3*   MCV 89.7 89.3   MCH 29.1 28.7   MCHC 32.4 32.1*   RDW 41.4 41.3   PLATELETCT 153* 151*   MPV 11.1 11.3       Recent Labs     10/08/23  0233 10/09/23  0202   SODIUM 141 140   POTASSIUM 4.6 5.2   CHLORIDE 109 108   CO2 24 23   GLUCOSE 166* 165*   BUN 14 19   CREATININE 0.58 0.76   CALCIUM 8.1* 8.8                     Imaging  DX-CHEST-LIMITED (1 VIEW)   Final Result         No acute cardiopulmonary abnormalities are identified.      DX-PORTABLE FLUOROSCOPY < 1 HOUR   Final Result      Portable fluoroscopy utilized for 26 seconds.         INTERPRETING LOCATION: [address] , [place], [address]      DX-HIP-UNILATERAL-W/O PELVIS-2/3 VIEWS RIGHT   Final Result      Digitized intraoperative radiograph is submitted for review. This examination is not for diagnostic purpose but for guidance during a surgical procedure. Please see the patient's chart for full procedural details.         INTERPRETING LOCATION: [address], [place], [address]      CT-SHOULDER W/O PLUS RECONS RIGHT   Final Result      1.  Comminuted humeral neck fracture is identified with significant impaction and displacement at the fracture site as described above.      2.  No evidence of dislocation at the glenohumeral joint.      3.  No other fracture or malalignment is identified.      [id number] FILM X-RAY   Final Result      QW-[id number]-ZFKESPE FILM X-RAY   Final Result      OUTSIDE IMAGES-DX CHEST   Final Result             Assessment/Plan  * Femoral neck fracture (HCC)  Assessment & Plan  S/p Open treatment with intramedullary nailing, right   intertrochanteric femur fracture.  PT/OT eval  Pain management, on IV narcotics, monitor for toxicity    Bradycardia  Assessment & Plan  Bradycardia resolved  Resume metoprolol, decrease dose to 50  XL    Leucocytosis  Assessment & Plan  Likely stress-induced  No concern of ongoing infection  Denies cough, dysuria remained afebrile  Chest x-ray unremarkable, procalcitonin negative  Monitor off antibiotics    ACP (advance care planning)  Assessment & Plan  Full code    Hyperlipidemia  Assessment & Plan  Continue lipitor    Hypertension  Assessment & Plan  Continue home medications    Diabetes (HCC)  Assessment & Plan  Sliding scale         VTE prophylaxis: lovenox    I have performed a physical exam and reviewed and updated ROS and Plan today (10/9/2023). In review of yesterday's note (10/8/2023), there are no changes except as documented above.

## 2023-10-09 NOTE — PROGRESS NOTES
4 Eyes Skin Assessment Completed by SUKHWINDER RN and JT Carrasco.    Head WDL  Ears WDL  Nose WDL  Mouth WDL  Neck WDL  Breast/Chest black mole on the left breast  Shoulder Blades WDL  Spine WDL  (R) Arm/Elbow/Hand WDL  (L) Arm/Elbow/Hand WDL  Abdomen WDL  Groin WDL  Scrotum/Coccyx/Buttocks WDL  (R) Leg Incision on the right hip; Discoloration on the right shin area  (L) Leg Discoloration on the left shin area  (R) Heel/Foot/Toe WDL  (L) Heel/Foot/Toe WDL          Devices In Places Pulse Ox, SCD's, and Nasal Cannula      Interventions In Place NC W/Ear Foams and Low Air Loss Mattress    Possible Skin Injury No    Pictures Uploaded Into Epic N/A  Wound Consult Placed N/A  RN Wound Prevention Protocol Ordered No

## 2023-10-09 NOTE — CARE PLAN
The patient is Stable - Low risk of patient condition declining or worsening    Shift Goals  Clinical Goals: mobility; pain control  Patient Goals: comfort  Family Goals: not present    Progress made toward(s) clinical / shift goals:    Problem: Pain - Standard  Goal: Alleviation of pain or a reduction in pain to the patient’s comfort goal  Outcome: Progressing     Problem: Fall Risk  Goal: Patient will remain free from falls  Outcome: Progressing     Problem: Skin Integrity  Goal: Skin integrity is maintained or improved  Outcome: Progressing       Patient is not progressing towards the following goals:

## 2023-10-09 NOTE — CARE PLAN
The patient is Stable - Low risk of patient condition declining or worsening    Shift Goals  Clinical Goals: pain mgmt  Patient Goals: pain control, rest, comfort  Family Goals: not present    Progress made toward(s) clinical / shift goals:        Problem: Knowledge Deficit - Standard  Goal: Patient and family/care givers will demonstrate understanding of plan of care, disease process/condition, diagnostic tests and medications  Outcome: Progressing     Problem: Pain - Standard  Goal: Alleviation of pain or a reduction in pain to the patient’s comfort goal  Outcome: Progressing     Problem: Fall Risk  Goal: Patient will remain free from falls  Outcome: Progressing     Problem: Skin Integrity  Goal: Skin integrity is maintained or improved  Outcome: Progressing       Patient is not progressing towards the following goals:

## 2023-10-09 NOTE — THERAPY
Occupational Therapy   Initial Evaluation     Patient Name: Miley Whittington  Age:  68 y.o., Sex:  female  Medical Record #: [id number]  Today's Date: 10/9/2023     Precautions  Precautions: Fall Risk, Weight Bearing As Tolerated Right Lower Extremity, Non Weight Bearing Right Upper Extremity, Sling Right Upper Extremity  Comments: right intertrochanteric femur fracture. s/p ORIF. right humeral neck fracture with additional concern for rotator cuff pathology    Assessment    Patient is 68 y.o. female admitted after a GLF, found to have cominuted and displaced multipart fracture of the R femoral neck and nondisplaced intertrochanteric femur fx, s/p open treatment w/ IM nailing, and R proximal humerus fx, pending R reverse shoulder replacement Thursday 10/12. Other pertinent medical history includes HTN, DM, and bradycardia. Pt seen for OT evaluation. Pt required max A to don socks and max A to don/doff sling. Pt encouraged to perform gentle ROM to wrist/digits. Pt required mod-max A for bed mobility and was able to perform a few lateral scoots EOB prior to becoming fatigued. Pt educated regarding the role of OT, sling wear/care, and current WB status. Pt current functional performance limited by impaired activity tolerance, impaired balance, and pain.  Pt will benefit from skilled OT while admitted to acute care.     Plan    Occupational Therapy Initial Treatment Plan   Treatment Interventions: Self Care / Activities of Daily Living, Adaptive Equipment, Neuro Re-Education / Balance, Therapeutic Exercises, Therapeutic Activity  Treatment Frequency: 3 Times per Week  Duration: Until Therapy Goals Met    DC Equipment Recommendations: Unable to determine at this time  Discharge Recommendations: Recommend post-acute placement for additional occupational therapy services prior to discharge home      Objective     10/09/23 1107   Prior Living Situation   Prior Services Home-Independent   Housing / Facility [facility]    Steps Into Home   (ramp)   Bathroom Set up Bathtub / Shower Combination;Shower Chair;Grab Bars   Equipment Owned Ramp;Wheelchair;Single Point Cane;4-Wheel Walker;Tub / Shower Seat;Grab Bar(s) In Tub / Shower   Lives with - Patient's Self Care Capacity Child Less than 18 Years of Age   Comments Pt lives with her 17 year old and 15 year old grandchildren who can assist as needed upon d/c   Prior Level of ADL Function   Self Feeding Independent   Grooming / Hygiene Independent   Bathing Independent   Dressing Independent   Toileting Independent   Prior Level of IADL Function   Medication Management Independent   Laundry Independent   Kitchen Mobility Independent   Finances Independent   Home Management Independent   Shopping Independent   Prior Level Of Mobility Independent With Device in Community;Independent With Device in Home  (SPC)   History of Falls   History of Falls Yes   Date of Last Fall   (reason for admission, fall prior to this in 2019)   Precautions   Precautions Fall Risk;Weight Bearing As Tolerated Right Lower Extremity;Non Weight Bearing Right Upper Extremity;Sling Right Upper Extremity   Pain   Pain Scales 0 to 10 Scale    Pain 0 - 10 Group   Therapist Pain Assessment During Activity;Nurse Notified  (not rated, crying out in pain on trial to stand)   Non Verbal Descriptors   Non Verbal Scale  Calm   Cognition    Cognition / Consciousness WDL   Level of Consciousness Alert   Comments Very pleasant, cooperative   Passive ROM Upper Body   Comments L UE WDL; R elbow limited by pain, wrist and digits WDL, shoulder NT   Active ROM Upper Body   Dominant Hand Right   Comments L UE WDL; R elbow limited by pain, wrist and digits WDL, shoulder NT   Strength Upper Body   Comments L UE grosly 4/5, R UE NT due to pain   Sensation Upper Body   Comments denied numbness/tingling   Upper Body Muscle Tone   Upper Body Muscle Tone  WDL   Coordination Upper Body   Coordination Not Tested   Balance Assessment   Sitting  Balance (Static) Fair   Sitting Balance (Dynamic) Fair -   Weight Shift Sitting Poor   Comments no stand due to pain   Bed Mobility    Supine to Sit Moderate Assist   Sit to Supine Maximal Assist   Scooting Maximal Assist   Comments HOB slightly elevated, pain limited   ADL Assessment   Upper Body Dressing Moderate Assist  (don/doff sling)   Lower Body Dressing Maximal Assist  (don socks)   Functional Mobility   Sit to Stand Unable to Participate   Mobility EOB only   Comments limited by pain   Visual Perception   Visual Perception  Not Tested   Activity Tolerance   Sitting in Chair NT   Sitting Edge of Bed >15 min   Standing NT   Comments limited by fatigue and pain   Patient / Family Goals   Patient / Family Goal #1 to go home   Short Term Goals   Short Term Goal # 1 Pt will perform LB dressing w/ supv and AE PRN   Short Term Goal # 2 Pt will perform ADL transfer w/ supv   Short Term Goal # 3 Pt will perform UB dressing w/ supv   Education Group   Education Provided Role of Occupational Therapist;Activities of Daily Living;Upper Extremity Range of Motion;Brace Wear and Care;Weight Bearing Precautions   Role of Occupational Therapist Patient Response Patient;Acceptance;Explanation;Verbal Demonstration   Upper Ext ROM Patient Response Patient;Acceptance;Explanation;Demonstration;Verbal Demonstration;Action Demonstration   Brace Wear & Care Patient Response Patient;Acceptance;Explanation;Demonstration;Verbal Demonstration;Action Demonstration;Reinforcement Needed   ADL Patient Response Patient;Acceptance;Explanation;Demonstration;Verbal Demonstration;Action Demonstration   Weight Bearing Precautions Patient Response Patient;Acceptance;Explanation;Demonstration;Verbal Demonstration;Action Demonstration   Occupational Therapy Initial Treatment Plan    Treatment Interventions Self Care / Activities of Daily Living;Adaptive Equipment;Neuro Re-Education / Balance;Therapeutic Exercises;Therapeutic Activity   Treatment  Frequency 3 Times per Week   Duration Until Therapy Goals Met   Problem List   Problem List Decreased Active Daily Living Skills;Decreased Homemaking Skills;Decreased Upper Extremity Strength Right;Decreased Upper Extremity AROM Right;Decreased Upper Extremity PROM Right;Decreased Functional Mobility;Decreased Activity Tolerance;Impaired Postural Control / Balance

## 2023-10-09 NOTE — DISCHARGE PLANNING
"Case Management Discharge Planning    Admission Date: 10/7/2023  GMLOS: 4.4  ALOS: 2    6-Clicks ADL Score: 14  6-Clicks Mobility Score: 6  PT and/or OT Eval ordered: Yes  Post-acute Referrals Ordered: No  Post-acute Choice Obtained: No  Has referral(s) been sent to post-acute provider:  Yes      Anticipated Discharge Dispo: Discharge Disposition: D/T to SNF with Medicare cert in anticipation of skilled care (03)    DME Needed: No    Action(s) Taken: DC Assessment Complete (See below)    LSW met with patient at bedside to discuss dc plan. Patient presented as alert as evidenced by her ability to follow conversation regarding dc plan. Patient reported to live in a single story home with a ramp to enter. Patient confirmed demographic information on facesheet. Patient reported to be independent with ADLs/IADLs prior to admission. Patient reported to have a cane, walker, wheelchair, and shower chair. Patient denied a hx of substance abuse or mental health.       LSW discussed dc recommendations (post-acute placement). Patient is agreeable with dc plan and stated \"that's fine\". LSW sent referrals to [facility] and [facility] in [place]. Pending acceptance to SNF.     Plan of care ongoing.    Escalations Completed: None        Medically Clear: No    Next Steps: Pending medical clearance and SNF acceptance.    Barriers to Discharge: Medical clearance and Pending Placement    Is the patient up for discharge tomorrow: No      Care Transition Team Assessment    Information Source  Orientation Level: Oriented X4  Information Given By: Patient  Who is responsible for making decisions for patient? : Patient    Readmission Evaluation  Is this a readmission?: No    Elopement Risk  Legal Hold: No  Ambulatory or Self Mobile in Wheelchair: No-Not an Elopement Risk  Elopement Risk: Not at Risk for Elopement    Interdisciplinary Discharge Planning  Lives with - Patient's Self Care Capacity: Child Less than 18 Years of " Age  Housing / Facility: [facility]  Prior Services: Home-Independent    Discharge Preparedness  What is your plan after discharge?: Skilled nursing facility  What are your discharge supports?: Child  Prior Functional Level: Independent with Activities of Daily Living, Independent with Medication Management, Uses Walker, Uses Wheelchair, Uses Cane  Difficulity with ADLs: None  Difficulity with IADLs: None    Functional Assesment  Prior Functional Level: Independent with Activities of Daily Living, Independent with Medication Management, Uses Walker, Uses Wheelchair, Uses Cane    Finances  Financial Barriers to Discharge: No  Prescription Coverage: Yes    Vision / Hearing Impairment  Right Eye Vision: Wears Glasses  Left Eye Vision: Wears Glasses         Advance Directive  Advance Directive?: None         Psychological Assessment  History of Substance Abuse: None (Pt denies)  History of Psychiatric Problems: No (denies hx of mental health)  Non-compliant with Treatment: No  Newly Diagnosed Illness: No    Discharge Risks or Barriers  Discharge risks or barriers?: Complex medical needs  Patient risk factors: Vulnerable adult    Anticipated Discharge Information  Discharge Disposition: D/T to SNF with Medicare cert in anticipation of skilled care (03)

## 2023-10-09 NOTE — PROGRESS NOTES
"\    Orthopedic PA Progress Note    Interval changes:  Patient doing well postop.  RLE dressings are CDI  WBAT RLE  NWB RUE in sling  SCDs to be continued  Plan for right reverse shoulder replacement Thursday 10/12 with Dr. Nona GROVE Wednesday [place]    ROS - Patient denies any new issues.  Denies any numbness or tingling. Pain well controlled.    /70   Pulse 69   Temp 36.5 °C (97.7 °F) (Temporal)   Resp 17   Ht 1.6 m (5' 3\")   Wt 71 kg (156 lb 8.4 oz)   SpO2 91%     Patient seen and examined  No acute distress  Breathing non labored  RRR  RLE: Surgical dressing is clean, dry, and intact. Patient clearly fires tibialis anterior, EHL, and gastrocnemius/soleus. Sensation is intact to light touch throughout superficial peroneal, deep peroneal, tibial, saphenous, and sural nerve distributions. Strong and palpable 2+ dorsalis pedis and posterior tibial pulses with capillary refill less than 2 seconds.     Recent Labs     10/08/23  0233 10/09/23  0202   WBC 13.5* 16.4*   RBC 4.16* 3.73*   HEMOGLOBIN 12.1 10.7*   HEMATOCRIT 37.3 33.3*   MCV 89.7 89.3   MCH 29.1 28.7   MCHC 32.4 32.1*   RDW 41.4 41.3   PLATELETCT 153* 151*   MPV 11.1 11.3       Active Hospital Problems    Diagnosis     Leucocytosis [D72.829]     Bradycardia [R00.1]     Femoral neck fracture ([facility]) [S72.009A]     ACP (advance care planning) [Z71.89]     Closed right hip fracture, initial encounter ([facility]) [S72.001A]     Hyperlipidemia [E78.5]     Hypertension [I10]     Diabetes ([facility]) [E11.9]        Assessment/Plan:  RLE dressings are CDI  WBAT RLE  NWB RUE in sling  SCDs to be continued  Plan for right reverse shoulder replacement Thursday 10/12 with Dr. Nona GROVE Wednesday [place]    POD#1 S/p  Open treatment with intramedullary nailing, right   intertrochanteric femur fracture.  Wt bearing status - NWB RUE in sling, WBAT RLE  Wound care/Drains - Dressings to be changed every other day by nursing. Or PRN for saturation starting POD#2  Future " Procedures -  Right reverse shoulder replacement Thursday 10/12  Lovenox: Start 10/8, Duration-until ambulatory > 150'  Sutures/Staples out- 14-21 days post operatively. Removal will completed by ortho [facility]DO's unless transferred.  PT/OT-initiated  Antibiotics:  Perioperative completed  DVT Prophylaxis- TEDS/SCDs/Foot pumps  Burroughs-not needed per ortho  Case Coordination for Discharge Planning - Disposition per therapy recs.

## 2023-10-09 NOTE — THERAPY
Physical Therapy   Initial Evaluation     Patient Name: Miley Whittington  Age:  68 y.o., Sex:  female  Medical Record #: [id number]  Today's Date: 10/9/2023     Precautions  Precautions: Fall Risk;Weight Bearing As Tolerated Right Lower Extremity;Non Weight Bearing Right Upper Extremity;Sling Right Upper Extremity  Comments: right intertrochanteric femur fracture. s/p ORIF. right humeral neck fracture.    Assessment  Patient is 68 y.o. female right intertrochanteric femur fracture. s/p ORIF right humeral neck fracture  Patient was at home and slipped on a wet tile and fell on the right side of her body. will need right shoulder arthroplasty at a later date. PMhx of COPD, arthritis, DM, GERD, Glaucoma, high cholesterol, hyperlipidemia, HTN, snoring. Pt lives with grandchildren, was using a cane.     Pt with increase pain with mobility. Able to sit up at EOB and work on sitting balance and scooting at EOB. Pt u/a to scoot to recliner chair due to fatigue, weakness and pain. Assisted pt back to bed. Patient with decreased activity tolerance, high fall risk, decreased sitting balance, and pain and will continue to benefit from Acute Care Physical Therapy to assist towards established goals.   Anticipate pt will benefit from post acute placement upon DC from hospital.       Plan    Physical Therapy Initial Treatment Plan   Treatment Plan : Bed Mobility, Equipment, Gait Training, Neuro Re-Education / Balance, Therapeutic Activities, Therapeutic Exercise  Treatment Frequency: 4 Times per Week  Duration: Until Therapy Goals Met    DC Equipment Recommendations: Unable to determine at this time, Wheelchair (possibly wheelchair with leg rests and removeable arm rests-TBD)  Discharge Recommendations: Recommend post-acute placement for additional physical therapy services prior to discharge home       Subjective    Pt resting in bed. Agreeable to PT/OT dana.      Objective       10/09/23 1101   Initial Contact Note    Initial  Contact Note Order Received and Verified, Physical Therapy Evaluation in Progress with Full Report to Follow.   Precautions   Precautions Fall Risk;Weight Bearing As Tolerated Right Lower Extremity;Non Weight Bearing Right Upper Extremity;Sling Right Upper Extremity   Comments right intertrochanteric femur fracture. s/p ORIF. right humeral neck fracture.   Pain 0 - 10 Group   Location Leg;Arm   Location Orientation Right   Therapist Pain Assessment During Activity;Nurse Notified   Prior Living Situation   Prior Services Home-Independent   Housing / Facility 1 Story House   Bathroom Set up Bathtub / Shower Combination;Shower Chair;Grab Bars   Equipment Owned Ramp;Single Point Cane   Lives with - Patient's Self Care Capacity Child Less than 18 Years of Age   Comments Pt lives with her 17 year old and 15 year old grandchildren who can assist as needed upon d/c   History of Falls   History of Falls Yes   Date of Last Fall 10/07/23  (last fall was in 2019)   Cognition    Cognition / Consciousness WDL   Level of Consciousness Alert   Passive ROM Upper Body   Comments R UE in sling   Active ROM Lower Body    Active ROM Lower Body  X   Comments limited AROM R LE due to pain   Strength Lower Body   Lower Body Strength  X   Comments pt required asssitance with all mobility R LE due to Sx   Coordination Lower Body    Coordination Lower Body  WDL   Balance Assessment   Sitting Balance (Static) Fair   Sitting Balance (Dynamic) Fair -   Weight Shift Sitting Poor   Bed Mobility    Supine to Sit Moderate Assist  (2 person assist)   Sit to Supine Maximal Assist  (2 person assist)   Scooting Maximal Assist  (max A x2 to scoot to HOB, min A to scoot 2 times at EOB)   Comments pt required 2 person assist for bed mobility,   Gait Analysis   Gait Level Of Assist Unable to Participate   Functional Mobility   Sit to Stand Unable to Participate   How much difficulty does the patient currently have...   Turning over in bed (including  adjusting bedclothes, sheets and blankets)? 1   Sitting down on and standing up from a chair with arms (e.g., wheelchair, bedside commode, etc.) 1   Moving from lying on back to sitting on the side of the bed? 1   How much help from another person does the patient currently need...   Moving to and from a bed to a chair (including a wheelchair)? 1   Need to walk in a hospital room? 1   Climbing 3-5 steps with a railing? 1   6 clicks Mobility Score 6   Activity Tolerance   Sitting Edge of Bed 15 minutes for balance activities and UE activities   Comments pt fatigued after attempting to scoot into recliner chair   Short Term Goals    Short Term Goal # 1 supine<>sit min A in 6 visits to progress bed mobility   Short Term Goal # 2 EOB<>WC with slide board or squat pivot min A in 6 visits for progressing transfers   Short Term Goal # 3 WC propulsion and management 100ft supervised in 6 visits for household mobility   Short Term Goal # 4 sit to stand with hemiwalker mod A in 6 visits to progress with standing transfers   Education Group   Education Provided Role of Physical Therapist;Weight Bearing Precautions   Role of Physical Therapist Patient Response Patient;Acceptance;Explanation;Verbal Demonstration   Weight Bearing Precautions Patient Response Patient;Acceptance;Explanation;Demonstration;Verbal Demonstration;Action Demonstration   Physical Therapy Initial Treatment Plan    Treatment Plan  Bed Mobility;Equipment;Gait Training;Neuro Re-Education / Balance;Therapeutic Activities;Therapeutic Exercise   Treatment Frequency 4 Times per Week   Duration Until Therapy Goals Met   Problem List    Problems Pain;Impaired Bed Mobility;Impaired Transfers;Impaired Ambulation;Functional Strength Deficit;Impaired Balance;Decreased Activity Tolerance;Limited Knowledge of Post-Op Precautions   Anticipated Discharge Equipment and Recommendations   DC Equipment Recommendations Unable to determine at this time;Wheelchair  (possibly  wheelchair with leg rests and removeable arm rests-TBD)   Discharge Recommendations Recommend post-acute placement for additional physical therapy services prior to discharge home   Interdisciplinary Plan of Care Collaboration   IDT Collaboration with  ;Nursing;Occupational Therapist  Patient benefited from co-treatment with Occupational Therapist for the following reason(s):  Patient required 2 person assistance for safety and to provide effective interventions. Each discipline assisted patient with appropriate and separate goals.     Patient Position at End of Therapy In Bed;Bed Alarm On;Call Light within Reach;Tray Table within Reach;Phone within Reach

## 2023-10-10 LAB
APPEARANCE UR: CLEAR
BASOPHILS # BLD AUTO: 0.4 % (ref 0–1.8)
BASOPHILS # BLD: 0.05 K/UL (ref 0–0.12)
BILIRUB UR QL STRIP.AUTO: NEGATIVE
COLOR UR: YELLOW
EOSINOPHIL # BLD AUTO: 0.15 K/UL (ref 0–0.51)
EOSINOPHIL NFR BLD: 1.3 % (ref 0–6.9)
ERYTHROCYTE [DISTWIDTH] IN BLOOD BY AUTOMATED COUNT: 41.7 FL (ref 35.9–50)
GLUCOSE BLD STRIP.AUTO-MCNC: 162 MG/DL (ref 65–99)
GLUCOSE BLD STRIP.AUTO-MCNC: 178 MG/DL (ref 65–99)
GLUCOSE BLD STRIP.AUTO-MCNC: 224 MG/DL (ref 65–99)
GLUCOSE BLD STRIP.AUTO-MCNC: 252 MG/DL (ref 65–99)
GLUCOSE UR STRIP.AUTO-MCNC: NEGATIVE MG/DL
HCT VFR BLD AUTO: 31.8 % (ref 37–47)
HGB BLD-MCNC: 10.3 G/DL (ref 12–16)
IMM GRANULOCYTES # BLD AUTO: 0.12 K/UL (ref 0–0.11)
IMM GRANULOCYTES NFR BLD AUTO: 1 % (ref 0–0.9)
KETONES UR STRIP.AUTO-MCNC: NEGATIVE MG/DL
LEUKOCYTE ESTERASE UR QL STRIP.AUTO: NEGATIVE
LYMPHOCYTES # BLD AUTO: 3.04 K/UL (ref 1–4.8)
LYMPHOCYTES NFR BLD: 25.6 % (ref 22–41)
MCH RBC QN AUTO: 28.7 PG (ref 27–33)
MCHC RBC AUTO-ENTMCNC: 32.4 G/DL (ref 32.2–35.5)
MCV RBC AUTO: 88.6 FL (ref 81.4–97.8)
MICRO URNS: NORMAL
MONOCYTES # BLD AUTO: 1.15 K/UL (ref 0–0.85)
MONOCYTES NFR BLD AUTO: 9.7 % (ref 0–13.4)
NEUTROPHILS # BLD AUTO: 7.35 K/UL (ref 1.82–7.42)
NEUTROPHILS NFR BLD: 62 % (ref 44–72)
NITRITE UR QL STRIP.AUTO: NEGATIVE
NRBC # BLD AUTO: 0 K/UL
NRBC BLD-RTO: 0 /100 WBC (ref 0–0.2)
PH UR STRIP.AUTO: 6 [PH] (ref 5–8)
PLATELET # BLD AUTO: 156 K/UL (ref 164–446)
PMV BLD AUTO: 11 FL (ref 9–12.9)
PROCALCITONIN SERPL-MCNC: 0.11 NG/ML
PROT UR QL STRIP: NEGATIVE MG/DL
RBC # BLD AUTO: 3.59 M/UL (ref 4.2–5.4)
RBC UR QL AUTO: NEGATIVE
SP GR UR STRIP.AUTO: 1.01
UROBILINOGEN UR STRIP.AUTO-MCNC: 1 MG/DL
WBC # BLD AUTO: 11.9 K/UL (ref 4.8–10.8)

## 2023-10-10 PROCEDURE — 700102 HCHG RX REV CODE 250 W/ 637 OVERRIDE(OP): Performed by: STUDENT IN AN ORGANIZED HEALTH CARE EDUCATION/TRAINING PROGRAM

## 2023-10-10 PROCEDURE — 97116 GAIT TRAINING THERAPY: CPT | Mod: CQ

## 2023-10-10 PROCEDURE — A9270 NON-COVERED ITEM OR SERVICE: HCPCS | Performed by: STUDENT IN AN ORGANIZED HEALTH CARE EDUCATION/TRAINING PROGRAM

## 2023-10-10 PROCEDURE — 84145 PROCALCITONIN (PCT): CPT

## 2023-10-10 PROCEDURE — 85025 COMPLETE CBC W/AUTO DIFF WBC: CPT

## 2023-10-10 PROCEDURE — 81003 URINALYSIS AUTO W/O SCOPE: CPT

## 2023-10-10 PROCEDURE — 700111 HCHG RX REV CODE 636 W/ 250 OVERRIDE (IP): Mod: JZ | Performed by: STUDENT IN AN ORGANIZED HEALTH CARE EDUCATION/TRAINING PROGRAM

## 2023-10-10 PROCEDURE — A9270 NON-COVERED ITEM OR SERVICE: HCPCS | Performed by: INTERNAL MEDICINE

## 2023-10-10 PROCEDURE — 770001 HCHG ROOM/CARE - MED/SURG/GYN PRIV*

## 2023-10-10 PROCEDURE — 97530 THERAPEUTIC ACTIVITIES: CPT | Mod: CQ

## 2023-10-10 PROCEDURE — 82962 GLUCOSE BLOOD TEST: CPT

## 2023-10-10 PROCEDURE — 700111 HCHG RX REV CODE 636 W/ 250 OVERRIDE (IP): Performed by: STUDENT IN AN ORGANIZED HEALTH CARE EDUCATION/TRAINING PROGRAM

## 2023-10-10 PROCEDURE — 36415 COLL VENOUS BLD VENIPUNCTURE: CPT

## 2023-10-10 PROCEDURE — 99233 SBSQ HOSP IP/OBS HIGH 50: CPT | Performed by: INTERNAL MEDICINE

## 2023-10-10 PROCEDURE — 700102 HCHG RX REV CODE 250 W/ 637 OVERRIDE(OP): Performed by: INTERNAL MEDICINE

## 2023-10-10 RX ORDER — AMOXICILLIN 250 MG
2 CAPSULE ORAL 2 TIMES DAILY
Status: DISCONTINUED | OUTPATIENT
Start: 2023-10-10 | End: 2023-10-16

## 2023-10-10 RX ORDER — POLYETHYLENE GLYCOL 3350 17 G/17G
1 POWDER, FOR SOLUTION ORAL
Status: DISCONTINUED | OUTPATIENT
Start: 2023-10-10 | End: 2023-10-16

## 2023-10-10 RX ORDER — BISACODYL 10 MG
10 SUPPOSITORY, RECTAL RECTAL
Status: DISCONTINUED | OUTPATIENT
Start: 2023-10-10 | End: 2023-10-16

## 2023-10-10 RX ORDER — METHOCARBAMOL 500 MG/1
500 TABLET, FILM COATED ORAL 4 TIMES DAILY
Status: DISCONTINUED | OUTPATIENT
Start: 2023-10-10 | End: 2023-10-18 | Stop reason: HOSPADM

## 2023-10-10 RX ADMIN — INSULIN LISPRO 5 UNITS: 100 INJECTION, SOLUTION INTRAVENOUS; SUBCUTANEOUS at 17:08

## 2023-10-10 RX ADMIN — METHOCARBAMOL 500 MG: 500 TABLET ORAL at 21:46

## 2023-10-10 RX ADMIN — INSULIN LISPRO 3 UNITS: 100 INJECTION, SOLUTION INTRAVENOUS; SUBCUTANEOUS at 21:27

## 2023-10-10 RX ADMIN — INSULIN LISPRO 3 UNITS: 100 INJECTION, SOLUTION INTRAVENOUS; SUBCUTANEOUS at 17:08

## 2023-10-10 RX ADMIN — LISINOPRIL 20 MG: 20 TABLET ORAL at 05:04

## 2023-10-10 RX ADMIN — ATORVASTATIN CALCIUM 40 MG: 40 TABLET, FILM COATED ORAL at 17:14

## 2023-10-10 RX ADMIN — ENOXAPARIN SODIUM 40 MG: 100 INJECTION SUBCUTANEOUS at 17:14

## 2023-10-10 RX ADMIN — INSULIN GLARGINE-YFGN 14 UNITS: 100 INJECTION, SOLUTION SUBCUTANEOUS at 17:09

## 2023-10-10 RX ADMIN — METHOCARBAMOL 500 MG: 500 TABLET ORAL at 14:27

## 2023-10-10 RX ADMIN — DOCUSATE SODIUM 50 MG AND SENNOSIDES 8.6 MG 2 TABLET: 8.6; 5 TABLET, FILM COATED ORAL at 17:59

## 2023-10-10 RX ADMIN — METOPROLOL SUCCINATE 50 MG: 50 TABLET, EXTENDED RELEASE ORAL at 05:04

## 2023-10-10 RX ADMIN — INSULIN LISPRO 5 UNITS: 100 INJECTION, SOLUTION INTRAVENOUS; SUBCUTANEOUS at 11:30

## 2023-10-10 RX ADMIN — KETOROLAC TROMETHAMINE 15 MG: 30 INJECTION, SOLUTION INTRAMUSCULAR; INTRAVENOUS at 11:35

## 2023-10-10 RX ADMIN — INSULIN LISPRO 7 UNITS: 100 INJECTION, SOLUTION INTRAVENOUS; SUBCUTANEOUS at 11:30

## 2023-10-10 ASSESSMENT — COGNITIVE AND FUNCTIONAL STATUS - GENERAL
STANDING UP FROM CHAIR USING ARMS: A LITTLE
MOVING FROM LYING ON BACK TO SITTING ON SIDE OF FLAT BED: UNABLE
MOBILITY SCORE: 8
SUGGESTED CMS G CODE MODIFIER MOBILITY: CM
CLIMB 3 TO 5 STEPS WITH RAILING: TOTAL
MOVING TO AND FROM BED TO CHAIR: UNABLE
WALKING IN HOSPITAL ROOM: TOTAL
TURNING FROM BACK TO SIDE WHILE IN FLAT BAD: UNABLE

## 2023-10-10 ASSESSMENT — LIFESTYLE VARIABLES
AVERAGE NUMBER OF DAYS PER WEEK YOU HAVE A DRINK CONTAINING ALCOHOL: 4
DOES PATIENT WANT TO STOP DRINKING: NO
ON A TYPICAL DAY WHEN YOU DRINK ALCOHOL HOW MANY DRINKS DO YOU HAVE: 2
TOTAL SCORE: 0
EVER FELT BAD OR GUILTY ABOUT YOUR DRINKING: NO
HAVE YOU EVER FELT YOU SHOULD CUT DOWN ON YOUR DRINKING: NO
HAVE PEOPLE ANNOYED YOU BY CRITICIZING YOUR DRINKING: NO
HOW MANY TIMES IN THE PAST YEAR HAVE YOU HAD 5 OR MORE DRINKS IN A DAY: 0
EVER HAD A DRINK FIRST THING IN THE MORNING TO STEADY YOUR NERVES TO GET RID OF A HANGOVER: NO
TOTAL SCORE: 0
ALCOHOL_USE: YES
TOTAL SCORE: 0
CONSUMPTION TOTAL: POSITIVE

## 2023-10-10 ASSESSMENT — PATIENT HEALTH QUESTIONNAIRE - PHQ9
2. FEELING DOWN, DEPRESSED, IRRITABLE, OR HOPELESS: NOT AT ALL
SUM OF ALL RESPONSES TO PHQ9 QUESTIONS 1 AND 2: 0
1. LITTLE INTEREST OR PLEASURE IN DOING THINGS: NOT AT ALL
SUM OF ALL RESPONSES TO PHQ9 QUESTIONS 1 AND 2: 0
2. FEELING DOWN, DEPRESSED, IRRITABLE, OR HOPELESS: NOT AT ALL
1. LITTLE INTEREST OR PLEASURE IN DOING THINGS: NOT AT ALL

## 2023-10-10 ASSESSMENT — PAIN DESCRIPTION - PAIN TYPE
TYPE: ACUTE PAIN

## 2023-10-10 ASSESSMENT — GAIT ASSESSMENTS: GAIT LEVEL OF ASSIST: UNABLE TO PARTICIPATE

## 2023-10-10 ASSESSMENT — FIBROSIS 4 INDEX: FIB4 SCORE: 1.78

## 2023-10-10 NOTE — CARE PLAN
The patient is Stable - Low risk of patient condition declining or worsening    Shift Goals  Clinical Goals: pain management, BM, mobility  Patient Goals: comfort  Family Goals: not present    Progress made toward(s) clinical / shift goals:    Problem: Knowledge Deficit - Standard  Goal: Patient and family/care givers will demonstrate understanding of plan of care, disease process/condition, diagnostic tests and medications  Outcome: Progressing     Problem: Pain - Standard  Goal: Alleviation of pain or a reduction in pain to the patient’s comfort goal  Outcome: Progressing     Problem: Fall Risk  Goal: Patient will remain free from falls  Outcome: Progressing     Problem: Skin Integrity  Goal: Skin integrity is maintained or improved  Outcome: Progressing       Patient is not progressing towards the following goals:

## 2023-10-10 NOTE — DISCHARGE PLANNING
Agency/Facility Name: [facility]  Spoke To: Dwayne  Outcome: Was on hold for over 15 minutes while Dwayne tried to get admissions. Called back and per Dwayne admissions is supposed to call this DPA back.

## 2023-10-10 NOTE — THERAPY
Physical Therapy   Daily Treatment     Patient Name: Miley Whittington  Age:  68 y.o., Sex:  female  Medical Record #: [id number]  Today's Date: 10/10/2023     Precautions  Precautions: Fall Risk;Weight Bearing As Tolerated Right Lower Extremity;Non Weight Bearing Right Upper Extremity;Sling Right Upper Extremity  Comments: right intertrochanteric femur fracture. s/p ORIF. right humeral neck fracture.    Assessment    Pt greeted and seen for PT treatment. Pt req'd ModA for bed mobility, standing and transferring between surfaces using gema walker. Pt req'd VC to remain NWB w/ R UE. Two person assist recommended. Pt currently limited by impaired balance, weakness, decreased sequencing, decreased activity tolerance and impaired safety which negatively impacts functional mobility. Pt will continue to benefit from skilled PT to address deficits.       Plan    Treatment Plan Status: Continue Current Treatment Plan  Type of Treatment: Bed Mobility, Equipment, Gait Training, Neuro Re-Education / Balance, Therapeutic Activities, Therapeutic Exercise  Treatment Frequency: 4 Times per Week  Treatment Duration: Until Therapy Goals Met    DC Equipment Recommendations: Unable to determine at this time (possibly wheelchair with leg rests and removeable arm rests-TBD)  Discharge Recommendations: Recommend post-acute placement for additional physical therapy services prior to discharge home       10/10/23 1519   Cognition    Comments cooperative   Balance   Sitting Balance (Static) Fair   Sitting Balance (Dynamic) Fair -   Standing Balance (Static) Poor   Standing Balance (Dynamic) Poor   Weight Shift Sitting Poor   Weight Shift Standing Poor   Skilled Intervention Verbal Cuing;Tactile Cuing;Sequencing;Facilitation;Compensatory Strategies;Postural Facilitation   Comments w/ gema walker   Bed Mobility    Supine to Sit Moderate Assist   Scooting Moderate Assist   Rolling Standby Assist  (to L)   Skilled Intervention Verbal Cuing  "  Comments HOB raised, \"sleep in a recliner\"   Gait Analysis   Gait Level Of Assist Unable to Participate   Comments pt wasnt able to ambulation however she could take steps when transfering, weight shifting but unable to  feet for stepping, slides feet vs steps   Functional Mobility   Sit to Stand Moderate Assist  (x2)   Bed, Chair, Wheelchair Transfer Minimal Assist   Toilet Transfers   (x2)   Transfer Method Stand Step   Mobility bed>BSC>chair   Skilled Intervention Verbal Cuing;Tactile Cuing;Sequencing;Facilitation;Compensatory Strategies   Comments rest breaks required between standing attempts, pt with poor sequencing, very kyphotic and forward bent at hips with standing. x2 person assist for safety.   Short Term Goals    Short Term Goal # 1 supine<>sit min A in 6 visits to progress bed mobility   Goal Outcome # 1 goal not met   Short Term Goal # 2 EOB<>WC with slide board or squat pivot min A in 6 visits for progressing transfers   Goal Outcome # 2 Progressing as expected   Short Term Goal # 3 WC propulsion and management 100ft supervised in 6 visits for household mobility   Goal Outcome # 3 Goal not met   Short Term Goal # 4 sit to stand with hemiwalker mod A in 6 visits to progress with standing transfers   Goal Outcome # 4 Progressing as expected       "

## 2023-10-10 NOTE — CARE PLAN
The patient is Stable - Low risk of patient condition declining or worsening    Shift Goals  Clinical Goals: Pain control, BS Mgt  Patient Goals: rest  Family Goals: not present  Progress made toward(s) clinical / shift goals:  Patient alert and oriented, resting comfortably in bed.  Patient states in no pain.  Evening blood sugar 273 with 7 units humalog administered.     Patient is not progressing towards the following goals:

## 2023-10-10 NOTE — PROGRESS NOTES
Hospital Medicine Daily Progress Note    Date of Service  10/10/2023    Chief Complaint  Miley Whittington is a 68 y.o. female admitted 10/7/2023 with ground-level fall  Hospital Course  68 female with past medical history of COPD, hypertension, hyperlipidemia presented after ground-level fall subsequent x-ray noted with comminuted and displaced multipart fracture of the right femoral neck and nondisplaced intertrochanteric femur fracture.  CT of right shoulder showed comminuted humeral neck fracture with significant impaction and displacement  S/p Open treatment with intramedullary nailing, right   intertrochanteric femur fracture on 10/8.    Interval Problem Update  Patient was seen and examined at bedside.  I have personally reviewed and interpreted vitals, labs, and imaging.    10/10.  Afebrile.  Stable vitals.  On room air.  Leukocytosis at 11.9.  Urinalysis not suggestive of infection.  Procalcitonin within normal limits x2.  Chest x-ray with no acute cardiopulmonary disease.  Denies fever, chills, chest pains, shortness of breath, cough, congestion.  She does report some constipation.    I have discussed this patient's plan of care and discharge plan at IDT rounds today with Case Management, Nursing, Nursing leadership, and other members of the IDT team.    Consultants/Specialty  orthopedics    Code Status  Full Code    Disposition  The patient is not medically cleared for discharge to home or a post-acute facility.  Anticipate discharge to: skilled nursing facility    I have placed the appropriate orders for post-discharge needs.    Review of Systems  Review of Systems   Musculoskeletal:  Positive for joint pain.        Physical Exam  Temp:  [36.2 °C (97.2 °F)-36.7 °C (98.1 °F)] 36.4 °C (97.5 °F)  Pulse:  [75-84] 81  Resp:  [16-17] 17  BP: (121-150)/(65-86) 140/73  SpO2:  [88 %-98 %] 93 %    Physical Exam  Musculoskeletal:      Comments: Range of motion limited in right hip due to pain          Fluids    Intake/Output Summary (Last 24 hours) at 10/10/2023 1631  Last data filed at 10/10/2023 1010  Gross per 24 hour   Intake --   Output 800 ml   Net -800 ml         Laboratory  Recent Labs     10/08/23  0233 10/09/23  0202 10/10/23  0234   WBC 13.5* 16.4* 11.9*   RBC 4.16* 3.73* 3.59*   HEMOGLOBIN 12.1 10.7* 10.3*   HEMATOCRIT 37.3 33.3* 31.8*   MCV 89.7 89.3 88.6   MCH 29.1 28.7 28.7   MCHC 32.4 32.1* 32.4   RDW 41.4 41.3 41.7   PLATELETCT 153* 151* 156*   MPV 11.1 11.3 11.0       Recent Labs     10/08/23  0233 10/09/23  0202   SODIUM 141 140   POTASSIUM 4.6 5.2   CHLORIDE 109 108   CO2 24 23   GLUCOSE 166* 165*   BUN 14 19   CREATININE 0.58 0.76   CALCIUM 8.1* 8.8                     Imaging  DX-CHEST-LIMITED (1 VIEW)   Final Result         No acute cardiopulmonary abnormalities are identified.      DX-PORTABLE FLUOROSCOPY < 1 HOUR   Final Result      Portable fluoroscopy utilized for 26 seconds.         INTERPRETING LOCATION: [address] [facility], [address]      DX-HIP-UNILATERAL-W/O PELVIS-2/3 VIEWS RIGHT   Final Result      Digitized intraoperative radiograph is submitted for review. This examination is not for diagnostic purpose but for guidance during a surgical procedure. Please see the patient's chart for full procedural details.         INTERPRETING LOCATION: [address] [facility], [id number]      CT-SHOULDER W/O PLUS RECONS RIGHT   Final Result      1.  Comminuted humeral neck fracture is identified with significant impaction and displacement at the fracture site as described above.      2.  No evidence of dislocation at the glenohumeral joint.      3.  No other fracture or malalignment is identified.      QF-[id number] FILM X-RAY   Final Result      HF-[id number]-OFWSAEU FILM X-RAY   Final Result      OUTSIDE IMAGES-DX CHEST   Final Result             Assessment/Plan  * Femoral neck fracture (HCC)  Assessment & Plan  10/10/2023  S/p Open treatment with intramedullary nailing, right    intertrochanteric femur fracture.  PT/OT eval  Pain management, on IV narcotics, monitor for toxicity    Bradycardia  Assessment & Plan  10/10/2023  Bradycardia resolved  Resume metoprolol, decrease dose to 50 XL    Leucocytosis  Assessment & Plan  10/10/2023  Likely stress-induced  No concern of ongoing infection  Denies cough, dysuria remained afebrile  Urinalysis not concerning for infection  Chest x-ray unremarkable, procalcitonin negative x2  Monitor off antibiotics    ACP (advance care planning)  Assessment & Plan  Full code    Hyperlipidemia  Assessment & Plan  10/10/2023  Continue lipitor    Hypertension  Assessment & Plan  10/10/2023  Continue home medications    Diabetes (HCC)  Assessment & Plan  10/10/2023  Sliding scale         VTE prophylaxis: lovenox    I have performed a physical exam and reviewed and updated ROS and Plan today (10/10/2023). In review of yesterday's note (10/9/2023), there are no changes except as documented above.         Greater than 51 minutes spent prepping to see patient (e.g. review of tests) obtaining and/or reviewing separately obtained history. Performing a medically appropriate examination and/ evaluation.  Counseling and educating the patient/family/caregiver.  Ordering medications, tests, or procedures.  Referring and communicating with other health care professionals.  Documenting clinical information in EPIC.  Independently interpreting results and communicating results to patient/family/caregiver.  Care coordination.

## 2023-10-10 NOTE — DISCHARGE PLANNING
Agency/Facility Name: [facility]  Spoke To: Del  Outcome: Reviewing referral, will update DPA.    Agency[facility]Fac[facility] Name: [facility]  Spoke To: Paulina  Outcome: No beds until Thursday, will follow up on acceptance.    Agency/Facility Name: [facility]  Outcome: Reviewing referral- will respond in Epic.    @[id number]  Received message for Del @ [facility]- patient has 3 MSP cases on her Medicare, she would need to close them prior to acceptance.

## 2023-10-10 NOTE — DISCHARGE PLANNING
Case Management Discharge Planning    Admission Date: 10/7/2023  GMLOS: 4.4  ALOS: 3    6-Clicks ADL Score: 14  6-Clicks Mobility Score: 6  PT and/or OT Eval ordered: Yes  Post-acute Referrals Ordered: Yes  Post-acute Choice Obtained: No  Has referral(s) been sent to post-acute provider:  Yes      Anticipated Discharge Dispo: Discharge Disposition: D/T to SNF with Medicare cert in anticipation of skilled care (03)    DME Needed: No    Action(s) Taken: Updated Provider/Nurse on Discharge Plan    Patient discussed in IDT rounds. Patient is not medically cleared to dc at this time. Patient has been [facility] to [facility]     Plan of care ongoing    Escalations Completed: None    Medically Clear: Yes    Next Steps: Pending acceptance to SNF & medical clearance.    Barriers to Discharge: Medical clearance and Pending Placement    Is the patient up for discharge tomorrow: No

## 2023-10-11 ENCOUNTER — ANESTHESIA EVENT (OUTPATIENT)
Dept: SURGERY | Facility: MEDICAL CENTER | Age: 68
DRG: 481 | End: 2023-10-11
Payer: MEDICARE

## 2023-10-11 LAB
ANION GAP SERPL CALC-SCNC: 8 MMOL/L (ref 7–16)
BASOPHILS # BLD AUTO: 0.4 % (ref 0–1.8)
BASOPHILS # BLD: 0.06 K/UL (ref 0–0.12)
BUN SERPL-MCNC: 15 MG/DL (ref 8–22)
CALCIUM SERPL-MCNC: 9 MG/DL (ref 8.5–10.5)
CHLORIDE SERPL-SCNC: 101 MMOL/L (ref 96–112)
CO2 SERPL-SCNC: 25 MMOL/L (ref 20–33)
CREAT SERPL-MCNC: 0.53 MG/DL (ref 0.5–1.4)
EOSINOPHIL # BLD AUTO: 0.15 K/UL (ref 0–0.51)
EOSINOPHIL NFR BLD: 1.1 % (ref 0–6.9)
ERYTHROCYTE [DISTWIDTH] IN BLOOD BY AUTOMATED COUNT: 40.2 FL (ref 35.9–50)
GFR SERPLBLD CREATININE-BSD FMLA CKD-EPI: 99 ML/MIN/1.73 M 2
GLUCOSE BLD STRIP.AUTO-MCNC: 172 MG/DL (ref 65–99)
GLUCOSE BLD STRIP.AUTO-MCNC: 185 MG/DL (ref 65–99)
GLUCOSE BLD STRIP.AUTO-MCNC: 187 MG/DL (ref 65–99)
GLUCOSE BLD STRIP.AUTO-MCNC: 223 MG/DL (ref 65–99)
GLUCOSE SERPL-MCNC: 158 MG/DL (ref 65–99)
HCT VFR BLD AUTO: 31.7 % (ref 37–47)
HGB BLD-MCNC: 10.6 G/DL (ref 12–16)
IMM GRANULOCYTES # BLD AUTO: 0.12 K/UL (ref 0–0.11)
IMM GRANULOCYTES NFR BLD AUTO: 0.9 % (ref 0–0.9)
LYMPHOCYTES # BLD AUTO: 2.98 K/UL (ref 1–4.8)
LYMPHOCYTES NFR BLD: 22.2 % (ref 22–41)
MAGNESIUM SERPL-MCNC: 1.7 MG/DL (ref 1.5–2.5)
MCH RBC QN AUTO: 29.2 PG (ref 27–33)
MCHC RBC AUTO-ENTMCNC: 33.4 G/DL (ref 32.2–35.5)
MCV RBC AUTO: 87.3 FL (ref 81.4–97.8)
MONOCYTES # BLD AUTO: 1.24 K/UL (ref 0–0.85)
MONOCYTES NFR BLD AUTO: 9.2 % (ref 0–13.4)
NEUTROPHILS # BLD AUTO: 8.9 K/UL (ref 1.82–7.42)
NEUTROPHILS NFR BLD: 66.2 % (ref 44–72)
NRBC # BLD AUTO: 0 K/UL
NRBC BLD-RTO: 0 /100 WBC (ref 0–0.2)
PHOSPHATE SERPL-MCNC: 4.3 MG/DL (ref 2.5–4.5)
PLATELET # BLD AUTO: 186 K/UL (ref 164–446)
PMV BLD AUTO: 11.1 FL (ref 9–12.9)
POTASSIUM SERPL-SCNC: 4.3 MMOL/L (ref 3.6–5.5)
RBC # BLD AUTO: 3.63 M/UL (ref 4.2–5.4)
SODIUM SERPL-SCNC: 134 MMOL/L (ref 135–145)
WBC # BLD AUTO: 13.5 K/UL (ref 4.8–10.8)

## 2023-10-11 PROCEDURE — 700102 HCHG RX REV CODE 250 W/ 637 OVERRIDE(OP): Performed by: STUDENT IN AN ORGANIZED HEALTH CARE EDUCATION/TRAINING PROGRAM

## 2023-10-11 PROCEDURE — 700111 HCHG RX REV CODE 636 W/ 250 OVERRIDE (IP): Mod: JZ | Performed by: STUDENT IN AN ORGANIZED HEALTH CARE EDUCATION/TRAINING PROGRAM

## 2023-10-11 PROCEDURE — 700111 HCHG RX REV CODE 636 W/ 250 OVERRIDE (IP): Performed by: STUDENT IN AN ORGANIZED HEALTH CARE EDUCATION/TRAINING PROGRAM

## 2023-10-11 PROCEDURE — 82962 GLUCOSE BLOOD TEST: CPT | Mod: 91

## 2023-10-11 PROCEDURE — 770001 HCHG ROOM/CARE - MED/SURG/GYN PRIV*

## 2023-10-11 PROCEDURE — 83735 ASSAY OF MAGNESIUM: CPT

## 2023-10-11 PROCEDURE — 85025 COMPLETE CBC W/AUTO DIFF WBC: CPT

## 2023-10-11 PROCEDURE — A9270 NON-COVERED ITEM OR SERVICE: HCPCS | Performed by: STUDENT IN AN ORGANIZED HEALTH CARE EDUCATION/TRAINING PROGRAM

## 2023-10-11 PROCEDURE — 99233 SBSQ HOSP IP/OBS HIGH 50: CPT | Performed by: INTERNAL MEDICINE

## 2023-10-11 PROCEDURE — 97530 THERAPEUTIC ACTIVITIES: CPT | Mod: CQ

## 2023-10-11 PROCEDURE — 97116 GAIT TRAINING THERAPY: CPT | Mod: CQ

## 2023-10-11 PROCEDURE — 80048 BASIC METABOLIC PNL TOTAL CA: CPT

## 2023-10-11 PROCEDURE — A9270 NON-COVERED ITEM OR SERVICE: HCPCS | Performed by: INTERNAL MEDICINE

## 2023-10-11 PROCEDURE — 84100 ASSAY OF PHOSPHORUS: CPT

## 2023-10-11 PROCEDURE — 36415 COLL VENOUS BLD VENIPUNCTURE: CPT

## 2023-10-11 PROCEDURE — 700102 HCHG RX REV CODE 250 W/ 637 OVERRIDE(OP): Performed by: INTERNAL MEDICINE

## 2023-10-11 RX ORDER — LABETALOL HYDROCHLORIDE 5 MG/ML
10 INJECTION, SOLUTION INTRAVENOUS EVERY 6 HOURS PRN
Status: DISCONTINUED | OUTPATIENT
Start: 2023-10-11 | End: 2023-10-18 | Stop reason: HOSPADM

## 2023-10-11 RX ORDER — LANOLIN ALCOHOL/MO/W.PET/CERES
400 CREAM (GRAM) TOPICAL 2 TIMES DAILY
Status: COMPLETED | OUTPATIENT
Start: 2023-10-11 | End: 2023-10-11

## 2023-10-11 RX ADMIN — METHOCARBAMOL 500 MG: 500 TABLET ORAL at 08:31

## 2023-10-11 RX ADMIN — KETOROLAC TROMETHAMINE 15 MG: 30 INJECTION, SOLUTION INTRAMUSCULAR; INTRAVENOUS at 10:21

## 2023-10-11 RX ADMIN — METHOCARBAMOL 500 MG: 500 TABLET ORAL at 19:37

## 2023-10-11 RX ADMIN — INSULIN GLARGINE-YFGN 14 UNITS: 100 INJECTION, SOLUTION SUBCUTANEOUS at 17:07

## 2023-10-11 RX ADMIN — POLYETHYLENE GLYCOL 3350 1 PACKET: 17 POWDER, FOR SOLUTION ORAL at 08:31

## 2023-10-11 RX ADMIN — INSULIN LISPRO 3 UNITS: 100 INJECTION, SOLUTION INTRAVENOUS; SUBCUTANEOUS at 06:33

## 2023-10-11 RX ADMIN — INSULIN LISPRO 3 UNITS: 100 INJECTION, SOLUTION INTRAVENOUS; SUBCUTANEOUS at 11:56

## 2023-10-11 RX ADMIN — INSULIN LISPRO 4 UNITS: 100 INJECTION, SOLUTION INTRAVENOUS; SUBCUTANEOUS at 19:39

## 2023-10-11 RX ADMIN — LISINOPRIL 20 MG: 20 TABLET ORAL at 05:30

## 2023-10-11 RX ADMIN — MORPHINE SULFATE 3 MG: 4 INJECTION, SOLUTION INTRAMUSCULAR; INTRAVENOUS at 12:00

## 2023-10-11 RX ADMIN — INSULIN LISPRO 5 UNITS: 100 INJECTION, SOLUTION INTRAVENOUS; SUBCUTANEOUS at 06:30

## 2023-10-11 RX ADMIN — Medication 400 MG: at 08:31

## 2023-10-11 RX ADMIN — METHOCARBAMOL 500 MG: 500 TABLET ORAL at 16:59

## 2023-10-11 RX ADMIN — METHOCARBAMOL 500 MG: 500 TABLET ORAL at 13:48

## 2023-10-11 RX ADMIN — INSULIN LISPRO 5 UNITS: 100 INJECTION, SOLUTION INTRAVENOUS; SUBCUTANEOUS at 11:55

## 2023-10-11 RX ADMIN — INSULIN LISPRO 3 UNITS: 100 INJECTION, SOLUTION INTRAVENOUS; SUBCUTANEOUS at 17:08

## 2023-10-11 RX ADMIN — Medication 400 MG: at 16:59

## 2023-10-11 RX ADMIN — METOPROLOL SUCCINATE 50 MG: 50 TABLET, EXTENDED RELEASE ORAL at 05:30

## 2023-10-11 RX ADMIN — INSULIN LISPRO 5 UNITS: 100 INJECTION, SOLUTION INTRAVENOUS; SUBCUTANEOUS at 17:08

## 2023-10-11 RX ADMIN — ENOXAPARIN SODIUM 40 MG: 100 INJECTION SUBCUTANEOUS at 16:59

## 2023-10-11 RX ADMIN — ATORVASTATIN CALCIUM 40 MG: 40 TABLET, FILM COATED ORAL at 16:59

## 2023-10-11 RX ADMIN — DOCUSATE SODIUM 50 MG AND SENNOSIDES 8.6 MG 2 TABLET: 8.6; 5 TABLET, FILM COATED ORAL at 05:30

## 2023-10-11 ASSESSMENT — COGNITIVE AND FUNCTIONAL STATUS - GENERAL
MOBILITY SCORE: 11
WALKING IN HOSPITAL ROOM: A LITTLE
STANDING UP FROM CHAIR USING ARMS: A LITTLE
CLIMB 3 TO 5 STEPS WITH RAILING: TOTAL
TURNING FROM BACK TO SIDE WHILE IN FLAT BAD: A LOT
MOVING FROM LYING ON BACK TO SITTING ON SIDE OF FLAT BED: UNABLE
MOVING TO AND FROM BED TO CHAIR: UNABLE
SUGGESTED CMS G CODE MODIFIER MOBILITY: CL

## 2023-10-11 ASSESSMENT — PAIN DESCRIPTION - PAIN TYPE
TYPE: ACUTE PAIN
TYPE: ACUTE PAIN

## 2023-10-11 ASSESSMENT — GAIT ASSESSMENTS
ASSISTIVE DEVICE: HEMI-WALKER
GAIT LEVEL OF ASSIST: CONTACT GUARD ASSIST

## 2023-10-11 NOTE — PROGRESS NOTES
"      Orthopaedic Progress Note    Interval changes:  Patient doing well RLE   Return to OR tomorrow for R prox humerus  Right hip dressings changed incisions without issues   NPO after midnight    ROS - Patient denies any new issues.  Pain well controlled.    /72   Pulse 81   Temp 36.5 °C (97.7 °F) (Temporal)   Resp 16   Ht 1.6 m (5' 3\")   Wt 71 kg (156 lb 8.4 oz)   SpO2 92%     Patient seen and examined  No acute distress  Breathing non labored  RRR  RLE Surgical dressing is clean, dry, and intact. Patient clearly fires tibialis anterior, EHL, and gastrocnemius/soleus. Sensation is intact to light touch throughout superficial peroneal, deep peroneal, tibial, saphenous, and sural nerve distributions. Strong and palpable 2+ dorsalis pedis and posterior tibial pulses with capillary refill less than 2 seconds. No lower leg tenderness or discomfort. RUE in sling, DNVI, cap refill <2 sec.     Recent Labs     10/09/23  0202 10/10/23  0234 10/11/23  0315   WBC 16.4* 11.9* 13.5*   RBC 3.73* 3.59* 3.63*   HEMOGLOBIN 10.7* 10.3* 10.6*   HEMATOCRIT 33.3* 31.8* 31.7*   MCV 89.3 88.6 87.3   MCH 28.7 28.7 29.2   MCHC 32.1* 32.4 33.4   RDW 41.3 41.7 40.2   PLATELETCT 151* 156* 186   MPV 11.3 11.0 11.1       Active Hospital Problems    Diagnosis     Leucocytosis [D72.829]     Bradycardia [R00.1]     Femoral neck fracture ([facility]) [S72.009A]     ACP (advance care planning) [Z71.89]     Closed right hip fracture, initial encounter ([facility]) [S72.001A]     Hyperlipidemia [E78.5]     Hypertension [I10]     Diabetes ([facility]) [E11.9]        Assessment/Plan:  Patient doing well RLE   Return to OR tomorrow for R prox humerus  Right hip dressings changed incisions without issues   NPO after midnight  POD#3 S/P  Open treatment with intramedullary nailing, right intertrochanteric femur fracture.  Wt bearing status - NWB  Wound care/Drains - Dressings to be changed every other day by nursing. Or PRN for saturation starting POD#2  Future " Procedures - none planned   Lovenox: Start 10/8, Duration-until ambulatory > 150'  Sutures/Staples out- 14-21 days post operatively. Removal will completed by ortho mid levels only.  PT/OT-initiated  Antibiotics: Perioperative completed  DVT Prophylaxis- TEDS/SCDs/Foot pumps  Burroughs-not needed per ortho  Case Coordination for Discharge Planning - Disposition per therapy recs.

## 2023-10-11 NOTE — CARE PLAN
The patient is Stable - Low risk of patient condition declining or worsening    Shift Goals  Clinical Goals: Pain management  Patient Goals: rest and comfort  Family Goals: Not present    Progress made toward(s) clinical / shift goals:    Problem: Knowledge Deficit - Standard  Goal: Patient and family/care givers will demonstrate understanding of plan of care, disease process/condition, diagnostic tests and medications  Outcome: Progressing     Problem: Pain - Standard  Goal: Alleviation of pain or a reduction in pain to the patient’s comfort goal  Outcome: Progressing     Problem: Fall Risk  Goal: Patient will remain free from falls  Outcome: Progressing     Problem: Skin Integrity  Goal: Skin integrity is maintained or improved  Outcome: Progressing       Patient is not progressing towards the following goals:

## 2023-10-11 NOTE — DISCHARGE PLANNING
[facility] Name[facility]  Spoke To: Gali  Outcome: Referral in review.    Agency/Facility Name: [facility]  Spoke To: Paulina  Outcome: Patient accepted- does not have a bed until possibly Friday.    Agency/Facility Name: [facility]  Outcome: Left voice message for admissions regarding patient referral- requested a call back.    @1030  Spoke to Panchito @ [facility]- patient declined due to low acuity.

## 2023-10-11 NOTE — CARE PLAN
The patient is Stable - Low risk of patient condition declining or worsening    Shift Goals  Clinical Goals: Pain management  Patient Goals: Rest and comfort  Family Goals: Not present    Progress made toward(s) clinical / shift goals:  on going    Patient is  progressing towards the following goals:  Problem: Pain - Standard  Goal: Alleviation of pain or a reduction in pain to the patient’s comfort goal  Description: Target End Date:  Prior to discharge or change in level of care    Document on Vitals flowsheet    1.  Document pain using the appropriate pain scale per order or unit policy  2.  Educate and implement non-pharmacologic comfort measures (i.e. relaxation, distraction, massage, cold/heat therapy, etc.)  3.  Pain management medications as ordered  4.  Reassess pain after pain med administration per policy  5.  If opiods administered assess patient's response to pain medication is appropriate per POSS sedation scale  6.  Follow pain management plan developed in collaboration with patient and interdisciplinary team (including palliative care or pain specialists if applicable)  Outcome: Progressing     Problem: Knowledge Deficit - Standard  Goal: Patient and family/care givers will demonstrate understanding of plan of care, disease process/condition, diagnostic tests and medications  Description: Target End Date:  1-3 days or as soon as patient condition allows    Document in Patient Education    1.  Patient and family/caregiver oriented to unit, equipment, visitation policy and means for communicating concern  2.  Complete/review Learning Assessment  3.  Assess knowledge level of disease process/condition, treatment plan, diagnostic tests and medications  4.  Explain disease process/condition, treatment plan, diagnostic tests and medications  Outcome: Progressing     Problem: Fall Risk  Goal: Patient will remain free from falls  Description: Target End Date:  Prior to discharge or change in level of  care    Document interventions on the Barnes Guillermo Fall Risk Assessment    1.  Assess for fall risk factors  2.  Implement fall precautions  Outcome: Progressing     Problem: Skin Integrity  Goal: Skin integrity is maintained or improved  Description: Target End Date:  Prior to discharge or change in level of care    Document interventions on Skin Risk/Jose flowsheet groups and corresponding LDA    1.  Assess and monitor skin integrity, appearance and/or temperature  2.  Assess risk factors for impaired skin integrity and/or pressures ulcers  3.  Implement precautions to protect skin integrity in collaboration with interdisciplinary team  4.  Implement pressure ulcer prevention protocol if at risk for skin breakdown  5.  Confirm wound care consult if at risk for skin breakdown  6.  Ensure patient use of pressure relieving devices  (Low air loss bed, waffle overlay, heel protectors, ROHO cushion, etc)  Outcome: Progressing

## 2023-10-11 NOTE — THERAPY
Physical Therapy   Daily Treatment     Patient Name: Miley Whittington  Age:  68 y.o., Sex:  female  Medical Record #: [id number]  Today's Date: 10/11/2023     Precautions  Precautions: Fall Risk;Weight Bearing As Tolerated Right Lower Extremity;Non Weight Bearing Right Upper Extremity;Sling Right Upper Extremity  Comments: right intertrochanteric femur fracture. s/p ORIF. right humeral neck fracture.    Assessment    Pt greeted and seen for PT treatment. Pt demo'd improved mobility today. Pt req'd Marjan for bed mobility and Mod/MaxA for standing w/ gema walker. Pt was able to ambulate 3ft then 12ft w/ gema walker and CGA, receptive of cueing and pt education. Pt currently limited by impaired balance, weakness and decreased activity tolerance which negatively impacts functional mobility. Pt will continue to benefit from skilled PT to address deficits.       Plan    Treatment Plan Status: Continue Current Treatment Plan  Type of Treatment: Bed Mobility, Equipment, Gait Training, Neuro Re-Education / Balance, Therapeutic Activities, Therapeutic Exercise  Treatment Frequency: 4 Times per Week  Treatment Duration: Until Therapy Goals Met    DC Equipment Recommendations: Unable to determine at this time (possibly wheelchair with leg rests and removeable arm rests, quad cane-TBD)  Discharge Recommendations: Recommend post-acute placement for additional physical therapy services prior to discharge home       10/11/23 1438   Cognition    Comments pleasant, cooperative, more alert today   Balance   Sitting Balance (Static) Fair   Sitting Balance (Dynamic) Fair   Standing Balance (Static) Fair -   Standing Balance (Dynamic) Poor +   Weight Shift Sitting Fair   Weight Shift Standing Fair   Skilled Intervention Verbal Cuing;Tactile Cuing;Sequencing;Facilitation;Compensatory Strategies   Comments w/ gema walker   Bed Mobility    Supine to Sit Minimal Assist   Sit to Supine Minimal Assist   Scooting Minimal Assist   Skilled  Intervention Verbal Cuing;Facilitation   Comments HOB raised   Gait Analysis   Gait Level Of Assist Contact Guard Assist   Assistive Device Leo-Walker   Distance (Feet)   (3 ft, then 12ft)   Deviation Step To;Increased Base Of Support;Antalgic;Shuffled Gait;Bradykinetic   Weight Bearing Status WBAT R LE   Skilled Intervention Verbal Cuing;Sequencing;Compensatory Strategies   Comments pt able to implement cues for sequencing step/leo walker. pt improved weight shifting to offload for higher clearance w/ L LE.   Functional Mobility   Sit to Stand Maximal Assist   Bed, Chair, Wheelchair Transfer Minimal Assist   Transfer Method Stand Step   Mobility bed mobility, step to chair, then walked short distance   Skilled Intervention Verbal Cuing;Sequencing   Comments VC for foot placement and ant lean   Short Term Goals    Short Term Goal # 1 supine<>sit min A in 6 visits to progress bed mobility   Goal Outcome # 1 Goal met   Short Term Goal # 2 EOB<>WC with slide board or squat pivot min A in 6 visits for progressing transfers   Goal Outcome # 2 Goal met   Short Term Goal # 3 WC propulsion and management 100ft supervised in 6 visits for household mobility   Goal Outcome # 3 Goal not met   Short Term Goal # 4 sit to stand with hemiwalker mod A in 6 visits to progress with standing transfers   Goal Outcome # 4 Progressing as expected   Supervising Physical Therapist (PTA Treatments Only)   Supervising Physical Therapist Vannessa Shaver

## 2023-10-11 NOTE — PROGRESS NOTES
Hospital Medicine Daily Progress Note    Date of Service  10/11/2023    Chief Complaint  Miley Whittington is a 68 y.o. female admitted 10/7/2023 with ground-level fall  Hospital Course  68 female with past medical history of COPD, hypertension, hyperlipidemia presented after ground-level fall subsequent x-ray noted with comminuted and displaced multipart fracture of the right femoral neck and nondisplaced intertrochanteric femur fracture.  CT of right shoulder showed comminuted humeral neck fracture with significant impaction and displacement  S/p Open treatment with intramedullary nailing, right   intertrochanteric femur fracture on 10/8.    Interval Problem Update  Patient was seen and examined at bedside.  I have personally reviewed and interpreted vitals, labs, and imaging.    10/10.  Afebrile.  Stable vitals.  On room air.  Leukocytosis at 11.9.  Urinalysis not suggestive of infection.  Procalcitonin within normal limits x2.  Chest x-ray with no acute cardiopulmonary disease.  Denies fever, chills, chest pains, shortness of breath, cough, congestion.  She does report some constipation.  10/11.  Afebrile.  Hypertensive this morning.  On room air.  Replete mag.  Denies fevers, chills, chest pain, shortness of breath.  Shoulder and hip pain are controlled.  Plan for reverse shoulder replacement tomorrow.  N.p.o. after midnight.  Discussed with orthopedic surgery.    I have discussed this patient's plan of care and discharge plan at IDT rounds today with Case Management, Nursing, Nursing leadership, and other members of the IDT team.    Consultants/Specialty  orthopedics    Code Status  Full Code    Disposition  The patient is not medically cleared for discharge to home or a post-acute facility.  Anticipate discharge to: skilled nursing facility    I have placed the appropriate orders for post-discharge needs.    Review of Systems  Review of Systems   Musculoskeletal:  Positive for joint pain.        Physical  Exam  Temp:  [36.4 °C (97.5 °F)-36.8 °C (98.2 °F)] 36.5 °C (97.7 °F)  Pulse:  [81-91] 91  Resp:  [16-17] 16  BP: (140-170)/(73-98) 170/98  SpO2:  [93 %-95 %] 95 %    Physical Exam  Musculoskeletal:      Comments: Range of motion limited in right hip due to pain         Fluids    Intake/Output Summary (Last 24 hours) at 10/11/2023 0739  Last data filed at 10/10/2023 1010  Gross per 24 hour   Intake --   Output 800 ml   Net -800 ml         Laboratory  Recent Labs     10/09/23  0202 10/10/23  0234 10/11/23  0315   WBC 16.4* 11.9* 13.5*   RBC 3.73* 3.59* 3.63*   HEMOGLOBIN 10.7* 10.3* 10.6*   HEMATOCRIT 33.3* 31.8* 31.7*   MCV 89.3 88.6 87.3   MCH 28.7 28.7 29.2   MCHC 32.1* 32.4 33.4   RDW 41.3 41.7 40.2   PLATELETCT 151* 156* 186   MPV 11.3 11.0 11.1       Recent Labs     10/09/23  0202 10/11/23  0315   SODIUM 140 134*   POTASSIUM 5.2 4.3   CHLORIDE 108 101   CO2 23 25   GLUCOSE 165* 158*   BUN 19 15   CREATININE 0.76 0.53   CALCIUM 8.8 9.0                     Imaging  DX-CHEST-LIMITED (1 VIEW)   Final Result         No acute cardiopulmonary abnormalities are identified.      DX-PORTABLE FLUOROSCOPY < 1 HOUR   Final Result      Portable fluoroscopy utilized for 26 seconds.         INTERPRETING LOCATION: [address] [place] [address], [id number]      DX-HIP-UNILATERAL-W/O PELVIS-2/3 VIEWS RIGHT   Final Result      Digitized intraoperative radiograph is submitted for review. This examination is not for diagnostic purpose but for guidance during a surgical procedure. Please see the patient's chart for full procedural details.         INTERPRETING LOCATION: [address] [place] [address], [id number]      CT-SHOULDER W/O PLUS RECONS RIGHT   Final Result      1.  Comminuted humeral neck fracture is identified with significant impaction and displacement at the fracture site as described above.      2.  No evidence of dislocation at the glenohumeral joint.      3.  No other fracture or malalignment is identified.      ZR-UPIWFLZ-RLEVCSI FILM X-RAY    Final Result      SM-RTTEICG-REPNGDG FILM X-RAY   Final Result      OUTSIDE IMAGES-DX CHEST   Final Result             Assessment/Plan  * Femoral neck fracture (HCC)  Assessment & Plan  10/11/2023  S/p Open treatment with intramedullary nailing, right   intertrochanteric femur fracture.  PT/OT eval  Pain management, on IV narcotics, monitor for toxicity    Bradycardia  Assessment & Plan  10/11/2023  Bradycardia resolved  Resume metoprolol, decrease dose to 50 XL    Leucocytosis  Assessment & Plan  10/11/2023  Likely stress-induced  No concern of ongoing infection  Denies cough, dysuria remained afebrile  Urinalysis not concerning for infection  Chest x-ray unremarkable, procalcitonin negative x2  Monitor off antibiotics    ACP (advance care planning)  Assessment & Plan  Full code    Hyperlipidemia  Assessment & Plan  10/11/2023  Continue lipitor    Hypertension  Assessment & Plan  10/11/2023  Continue home medications    Diabetes (HCC)  Assessment & Plan  10/11/2023  Sliding scale         VTE prophylaxis: lovenox    I have performed a physical exam and reviewed and updated ROS and Plan today (10/11/2023). In review of yesterday's note (10/10/2023), there are no changes except as documented above.         Greater than 51 minutes spent prepping to see patient (e.g. review of tests) obtaining and/or reviewing separately obtained history. Performing a medically appropriate examination and/ evaluation.  Counseling and educating the patient/family/caregiver.  Ordering medications, tests, or procedures.  Referring and communicating with other health care professionals.  Documenting clinical information in EPIC.  Independently interpreting results and communicating results to patient/family/caregiver.  Care coordination.

## 2023-10-12 ENCOUNTER — APPOINTMENT (OUTPATIENT)
Dept: RADIOLOGY | Facility: MEDICAL CENTER | Age: 68
DRG: 481 | End: 2023-10-12
Attending: ORTHOPAEDIC SURGERY
Payer: MEDICARE

## 2023-10-12 ENCOUNTER — ANESTHESIA (OUTPATIENT)
Dept: SURGERY | Facility: MEDICAL CENTER | Age: 68
DRG: 481 | End: 2023-10-12
Payer: MEDICARE

## 2023-10-12 LAB
ABO GROUP BLD: NORMAL
ANION GAP SERPL CALC-SCNC: 8 MMOL/L (ref 7–16)
BASOPHILS # BLD AUTO: 0.5 % (ref 0–1.8)
BASOPHILS # BLD: 0.06 K/UL (ref 0–0.12)
BLD GP AB SCN SERPL QL: NORMAL
BUN SERPL-MCNC: 14 MG/DL (ref 8–22)
CALCIUM SERPL-MCNC: 9.4 MG/DL (ref 8.5–10.5)
CHLORIDE SERPL-SCNC: 102 MMOL/L (ref 96–112)
CO2 SERPL-SCNC: 27 MMOL/L (ref 20–33)
CREAT SERPL-MCNC: 0.6 MG/DL (ref 0.5–1.4)
EOSINOPHIL # BLD AUTO: 0.25 K/UL (ref 0–0.51)
EOSINOPHIL NFR BLD: 2 % (ref 0–6.9)
ERYTHROCYTE [DISTWIDTH] IN BLOOD BY AUTOMATED COUNT: 40.6 FL (ref 35.9–50)
GFR SERPLBLD CREATININE-BSD FMLA CKD-EPI: 96 ML/MIN/1.73 M 2
GLUCOSE BLD STRIP.AUTO-MCNC: 129 MG/DL (ref 65–99)
GLUCOSE BLD STRIP.AUTO-MCNC: 133 MG/DL (ref 65–99)
GLUCOSE BLD STRIP.AUTO-MCNC: 172 MG/DL (ref 65–99)
GLUCOSE BLD STRIP.AUTO-MCNC: 233 MG/DL (ref 65–99)
GLUCOSE SERPL-MCNC: 124 MG/DL (ref 65–99)
HCT VFR BLD AUTO: 33.3 % (ref 37–47)
HGB BLD-MCNC: 11.1 G/DL (ref 12–16)
IMM GRANULOCYTES # BLD AUTO: 0.17 K/UL (ref 0–0.11)
IMM GRANULOCYTES NFR BLD AUTO: 1.3 % (ref 0–0.9)
LYMPHOCYTES # BLD AUTO: 2.93 K/UL (ref 1–4.8)
LYMPHOCYTES NFR BLD: 22.9 % (ref 22–41)
MAGNESIUM SERPL-MCNC: 2 MG/DL (ref 1.5–2.5)
MCH RBC QN AUTO: 28.8 PG (ref 27–33)
MCHC RBC AUTO-ENTMCNC: 33.3 G/DL (ref 32.2–35.5)
MCV RBC AUTO: 86.5 FL (ref 81.4–97.8)
MONOCYTES # BLD AUTO: 1.12 K/UL (ref 0–0.85)
MONOCYTES NFR BLD AUTO: 8.8 % (ref 0–13.4)
NEUTROPHILS # BLD AUTO: 8.24 K/UL (ref 1.82–7.42)
NEUTROPHILS NFR BLD: 64.5 % (ref 44–72)
NRBC # BLD AUTO: 0 K/UL
NRBC BLD-RTO: 0 /100 WBC (ref 0–0.2)
PHOSPHATE SERPL-MCNC: 4.5 MG/DL (ref 2.5–4.5)
PLATELET # BLD AUTO: 241 K/UL (ref 164–446)
PMV BLD AUTO: 10.9 FL (ref 9–12.9)
POTASSIUM SERPL-SCNC: 5.1 MMOL/L (ref 3.6–5.5)
RBC # BLD AUTO: 3.85 M/UL (ref 4.2–5.4)
RH BLD: NORMAL
SODIUM SERPL-SCNC: 137 MMOL/L (ref 135–145)
WBC # BLD AUTO: 12.8 K/UL (ref 4.8–10.8)

## 2023-10-12 PROCEDURE — 700102 HCHG RX REV CODE 250 W/ 637 OVERRIDE(OP): Performed by: ANESTHESIOLOGY

## 2023-10-12 PROCEDURE — L3670 SO ACRO/CLAV CAN WEB PRE OTS: HCPCS

## 2023-10-12 PROCEDURE — 700111 HCHG RX REV CODE 636 W/ 250 OVERRIDE (IP): Mod: JZ | Performed by: ANESTHESIOLOGY

## 2023-10-12 PROCEDURE — 502240 HCHG MISC OR SUPPLY RC 0272: Performed by: ORTHOPAEDIC SURGERY

## 2023-10-12 PROCEDURE — 160035 HCHG PACU - 1ST 60 MINS PHASE I: Performed by: ORTHOPAEDIC SURGERY

## 2023-10-12 PROCEDURE — 86901 BLOOD TYPING SEROLOGIC RH(D): CPT

## 2023-10-12 PROCEDURE — 700105 HCHG RX REV CODE 258: Performed by: ORTHOPAEDIC SURGERY

## 2023-10-12 PROCEDURE — 3E0T3BZ INTRODUCTION OF ANESTHETIC AGENT INTO PERIPHERAL NERVES AND PLEXI, PERCUTANEOUS APPROACH: ICD-10-PCS | Performed by: ANESTHESIOLOGY

## 2023-10-12 PROCEDURE — 700102 HCHG RX REV CODE 250 W/ 637 OVERRIDE(OP): Performed by: STUDENT IN AN ORGANIZED HEALTH CARE EDUCATION/TRAINING PROGRAM

## 2023-10-12 PROCEDURE — 86900 BLOOD TYPING SEROLOGIC ABO: CPT

## 2023-10-12 PROCEDURE — C1713 ANCHOR/SCREW BN/BN,TIS/BN: HCPCS | Performed by: ORTHOPAEDIC SURGERY

## 2023-10-12 PROCEDURE — 0RRJ00Z REPLACEMENT OF RIGHT SHOULDER JOINT WITH REVERSE BALL AND SOCKET SYNTHETIC SUBSTITUTE, OPEN APPROACH: ICD-10-PCS | Performed by: ORTHOPAEDIC SURGERY

## 2023-10-12 PROCEDURE — 160048 HCHG OR STATISTICAL LEVEL 1-5: Performed by: ORTHOPAEDIC SURGERY

## 2023-10-12 PROCEDURE — 700102 HCHG RX REV CODE 250 W/ 637 OVERRIDE(OP): Performed by: INTERNAL MEDICINE

## 2023-10-12 PROCEDURE — A9270 NON-COVERED ITEM OR SERVICE: HCPCS | Performed by: ANESTHESIOLOGY

## 2023-10-12 PROCEDURE — 700111 HCHG RX REV CODE 636 W/ 250 OVERRIDE (IP): Mod: JZ | Performed by: STUDENT IN AN ORGANIZED HEALTH CARE EDUCATION/TRAINING PROGRAM

## 2023-10-12 PROCEDURE — A9270 NON-COVERED ITEM OR SERVICE: HCPCS | Performed by: STUDENT IN AN ORGANIZED HEALTH CARE EDUCATION/TRAINING PROGRAM

## 2023-10-12 PROCEDURE — 306637 HCHG MISC ORTHO ITEM RC 0274

## 2023-10-12 PROCEDURE — 502000 HCHG MISC OR IMPLANTS RC 0278: Performed by: ORTHOPAEDIC SURGERY

## 2023-10-12 PROCEDURE — 160029 HCHG SURGERY MINUTES - 1ST 30 MINS LEVEL 4: Performed by: ORTHOPAEDIC SURGERY

## 2023-10-12 PROCEDURE — 73030 X-RAY EXAM OF SHOULDER: CPT | Mod: RT

## 2023-10-12 PROCEDURE — 36415 COLL VENOUS BLD VENIPUNCTURE: CPT

## 2023-10-12 PROCEDURE — 86850 RBC ANTIBODY SCREEN: CPT

## 2023-10-12 PROCEDURE — 64415 NJX AA&/STRD BRCH PLXS IMG: CPT | Performed by: ORTHOPAEDIC SURGERY

## 2023-10-12 PROCEDURE — 160041 HCHG SURGERY MINUTES - EA ADDL 1 MIN LEVEL 4: Performed by: ORTHOPAEDIC SURGERY

## 2023-10-12 PROCEDURE — 82962 GLUCOSE BLOOD TEST: CPT | Mod: 91

## 2023-10-12 PROCEDURE — A9270 NON-COVERED ITEM OR SERVICE: HCPCS | Performed by: INTERNAL MEDICINE

## 2023-10-12 PROCEDURE — 770001 HCHG ROOM/CARE - MED/SURG/GYN PRIV*

## 2023-10-12 PROCEDURE — 700111 HCHG RX REV CODE 636 W/ 250 OVERRIDE (IP): Performed by: ORTHOPAEDIC SURGERY

## 2023-10-12 PROCEDURE — 700105 HCHG RX REV CODE 258: Performed by: ANESTHESIOLOGY

## 2023-10-12 PROCEDURE — 83735 ASSAY OF MAGNESIUM: CPT

## 2023-10-12 PROCEDURE — 85025 COMPLETE CBC W/AUTO DIFF WBC: CPT

## 2023-10-12 PROCEDURE — 160002 HCHG RECOVERY MINUTES (STAT): Performed by: ORTHOPAEDIC SURGERY

## 2023-10-12 PROCEDURE — C1776 JOINT DEVICE (IMPLANTABLE): HCPCS | Performed by: ORTHOPAEDIC SURGERY

## 2023-10-12 PROCEDURE — 700101 HCHG RX REV CODE 250: Performed by: ANESTHESIOLOGY

## 2023-10-12 PROCEDURE — 84100 ASSAY OF PHOSPHORUS: CPT

## 2023-10-12 PROCEDURE — 160009 HCHG ANES TIME/MIN: Performed by: ORTHOPAEDIC SURGERY

## 2023-10-12 PROCEDURE — 80048 BASIC METABOLIC PNL TOTAL CA: CPT

## 2023-10-12 PROCEDURE — 99233 SBSQ HOSP IP/OBS HIGH 50: CPT | Performed by: INTERNAL MEDICINE

## 2023-10-12 DEVICE — IMPLANTABLE DEVICE: Type: IMPLANTABLE DEVICE | Status: FUNCTIONAL

## 2023-10-12 RX ORDER — OXYCODONE HCL 5 MG/5 ML
10 SOLUTION, ORAL ORAL
Status: COMPLETED | OUTPATIENT
Start: 2023-10-12 | End: 2023-10-12

## 2023-10-12 RX ORDER — HYDRALAZINE HYDROCHLORIDE 20 MG/ML
INJECTION INTRAMUSCULAR; INTRAVENOUS PRN
Status: DISCONTINUED | OUTPATIENT
Start: 2023-10-12 | End: 2023-10-12 | Stop reason: SURG

## 2023-10-12 RX ORDER — HYDRALAZINE HYDROCHLORIDE 20 MG/ML
5 INJECTION INTRAMUSCULAR; INTRAVENOUS
Status: DISCONTINUED | OUTPATIENT
Start: 2023-10-12 | End: 2023-10-12 | Stop reason: HOSPADM

## 2023-10-12 RX ORDER — HALOPERIDOL 5 MG/ML
1 INJECTION INTRAMUSCULAR
Status: DISCONTINUED | OUTPATIENT
Start: 2023-10-12 | End: 2023-10-12 | Stop reason: HOSPADM

## 2023-10-12 RX ORDER — SODIUM CHLORIDE, SODIUM LACTATE, POTASSIUM CHLORIDE, CALCIUM CHLORIDE 600; 310; 30; 20 MG/100ML; MG/100ML; MG/100ML; MG/100ML
INJECTION, SOLUTION INTRAVENOUS CONTINUOUS
Status: DISCONTINUED | OUTPATIENT
Start: 2023-10-12 | End: 2023-10-12 | Stop reason: HOSPADM

## 2023-10-12 RX ORDER — HYDROMORPHONE HYDROCHLORIDE 2 MG/ML
INJECTION, SOLUTION INTRAMUSCULAR; INTRAVENOUS; SUBCUTANEOUS PRN
Status: DISCONTINUED | OUTPATIENT
Start: 2023-10-12 | End: 2023-10-12 | Stop reason: SURG

## 2023-10-12 RX ORDER — ROCURONIUM BROMIDE 10 MG/ML
INJECTION, SOLUTION INTRAVENOUS PRN
Status: DISCONTINUED | OUTPATIENT
Start: 2023-10-12 | End: 2023-10-12 | Stop reason: SURG

## 2023-10-12 RX ORDER — PHENYLEPHRINE HCL IN 0.9% NACL 0.5 MG/5ML
SYRINGE (ML) INTRAVENOUS PRN
Status: DISCONTINUED | OUTPATIENT
Start: 2023-10-12 | End: 2023-10-12 | Stop reason: SURG

## 2023-10-12 RX ORDER — DEXTROSE MONOHYDRATE 25 G/50ML
25 INJECTION, SOLUTION INTRAVENOUS
Status: DISCONTINUED | OUTPATIENT
Start: 2023-10-12 | End: 2023-10-12 | Stop reason: HOSPADM

## 2023-10-12 RX ORDER — ROPIVACAINE HYDROCHLORIDE 5 MG/ML
INJECTION, SOLUTION EPIDURAL; INFILTRATION; PERINEURAL
Status: COMPLETED | OUTPATIENT
Start: 2023-10-12 | End: 2023-10-12

## 2023-10-12 RX ORDER — METOCLOPRAMIDE HYDROCHLORIDE 5 MG/ML
INJECTION INTRAMUSCULAR; INTRAVENOUS PRN
Status: DISCONTINUED | OUTPATIENT
Start: 2023-10-12 | End: 2023-10-12 | Stop reason: SURG

## 2023-10-12 RX ORDER — DIPHENHYDRAMINE HYDROCHLORIDE 50 MG/ML
12.5 INJECTION INTRAMUSCULAR; INTRAVENOUS
Status: DISCONTINUED | OUTPATIENT
Start: 2023-10-12 | End: 2023-10-12 | Stop reason: HOSPADM

## 2023-10-12 RX ORDER — SODIUM CHLORIDE, SODIUM LACTATE, POTASSIUM CHLORIDE, CALCIUM CHLORIDE 600; 310; 30; 20 MG/100ML; MG/100ML; MG/100ML; MG/100ML
INJECTION, SOLUTION INTRAVENOUS
Status: DISCONTINUED | OUTPATIENT
Start: 2023-10-12 | End: 2023-10-12 | Stop reason: SURG

## 2023-10-12 RX ORDER — ACETAMINOPHEN 325 MG/1
650 TABLET ORAL ONCE
Status: COMPLETED | OUTPATIENT
Start: 2023-10-12 | End: 2023-10-12

## 2023-10-12 RX ORDER — MIDAZOLAM HYDROCHLORIDE 1 MG/ML
INJECTION INTRAMUSCULAR; INTRAVENOUS PRN
Status: DISCONTINUED | OUTPATIENT
Start: 2023-10-12 | End: 2023-10-12 | Stop reason: SURG

## 2023-10-12 RX ORDER — TRANEXAMIC ACID 100 MG/ML
INJECTION, SOLUTION INTRAVENOUS PRN
Status: DISCONTINUED | OUTPATIENT
Start: 2023-10-12 | End: 2023-10-12 | Stop reason: SURG

## 2023-10-12 RX ORDER — ONDANSETRON 2 MG/ML
INJECTION INTRAMUSCULAR; INTRAVENOUS PRN
Status: DISCONTINUED | OUTPATIENT
Start: 2023-10-12 | End: 2023-10-12 | Stop reason: SURG

## 2023-10-12 RX ORDER — EPHEDRINE SULFATE 50 MG/ML
5 INJECTION, SOLUTION INTRAVENOUS
Status: DISCONTINUED | OUTPATIENT
Start: 2023-10-12 | End: 2023-10-12 | Stop reason: HOSPADM

## 2023-10-12 RX ORDER — DEXAMETHASONE SODIUM PHOSPHATE 4 MG/ML
INJECTION, SOLUTION INTRA-ARTICULAR; INTRALESIONAL; INTRAMUSCULAR; INTRAVENOUS; SOFT TISSUE PRN
Status: DISCONTINUED | OUTPATIENT
Start: 2023-10-12 | End: 2023-10-12 | Stop reason: SURG

## 2023-10-12 RX ORDER — LIDOCAINE HYDROCHLORIDE 20 MG/ML
INJECTION, SOLUTION EPIDURAL; INFILTRATION; INTRACAUDAL; PERINEURAL PRN
Status: DISCONTINUED | OUTPATIENT
Start: 2023-10-12 | End: 2023-10-12 | Stop reason: SURG

## 2023-10-12 RX ORDER — HYDROMORPHONE HYDROCHLORIDE 1 MG/ML
0.1 INJECTION, SOLUTION INTRAMUSCULAR; INTRAVENOUS; SUBCUTANEOUS
Status: DISCONTINUED | OUTPATIENT
Start: 2023-10-12 | End: 2023-10-12 | Stop reason: HOSPADM

## 2023-10-12 RX ORDER — CEFAZOLIN SODIUM 1 G/3ML
INJECTION, POWDER, FOR SOLUTION INTRAMUSCULAR; INTRAVENOUS PRN
Status: DISCONTINUED | OUTPATIENT
Start: 2023-10-12 | End: 2023-10-12 | Stop reason: SURG

## 2023-10-12 RX ORDER — OXYCODONE HCL 5 MG/5 ML
5 SOLUTION, ORAL ORAL
Status: COMPLETED | OUTPATIENT
Start: 2023-10-12 | End: 2023-10-12

## 2023-10-12 RX ORDER — HYDROMORPHONE HYDROCHLORIDE 1 MG/ML
0.2 INJECTION, SOLUTION INTRAMUSCULAR; INTRAVENOUS; SUBCUTANEOUS
Status: DISCONTINUED | OUTPATIENT
Start: 2023-10-12 | End: 2023-10-12 | Stop reason: HOSPADM

## 2023-10-12 RX ORDER — HYDROMORPHONE HYDROCHLORIDE 1 MG/ML
0.4 INJECTION, SOLUTION INTRAMUSCULAR; INTRAVENOUS; SUBCUTANEOUS
Status: DISCONTINUED | OUTPATIENT
Start: 2023-10-12 | End: 2023-10-12 | Stop reason: HOSPADM

## 2023-10-12 RX ORDER — EPHEDRINE SULFATE 50 MG/ML
INJECTION, SOLUTION INTRAVENOUS PRN
Status: DISCONTINUED | OUTPATIENT
Start: 2023-10-12 | End: 2023-10-12 | Stop reason: SURG

## 2023-10-12 RX ORDER — VANCOMYCIN HYDROCHLORIDE 1 G/20ML
INJECTION, POWDER, LYOPHILIZED, FOR SOLUTION INTRAVENOUS
Status: COMPLETED | OUTPATIENT
Start: 2023-10-12 | End: 2023-10-12

## 2023-10-12 RX ORDER — DEXAMETHASONE SODIUM PHOSPHATE 4 MG/ML
INJECTION, SOLUTION INTRA-ARTICULAR; INTRALESIONAL; INTRAMUSCULAR; INTRAVENOUS; SOFT TISSUE
Status: COMPLETED | OUTPATIENT
Start: 2023-10-12 | End: 2023-10-12

## 2023-10-12 RX ORDER — ONDANSETRON 2 MG/ML
4 INJECTION INTRAMUSCULAR; INTRAVENOUS
Status: DISCONTINUED | OUTPATIENT
Start: 2023-10-12 | End: 2023-10-12 | Stop reason: HOSPADM

## 2023-10-12 RX ADMIN — METHOCARBAMOL 500 MG: 500 TABLET ORAL at 14:46

## 2023-10-12 RX ADMIN — ROPIVACAINE HYDROCHLORIDE 17 ML: 5 INJECTION EPIDURAL; INFILTRATION; PERINEURAL at 07:08

## 2023-10-12 RX ADMIN — TRANEXAMIC ACID 1000 MG: 100 INJECTION, SOLUTION INTRAVENOUS at 07:48

## 2023-10-12 RX ADMIN — HYDROMORPHONE HYDROCHLORIDE 0.2 MG: 2 INJECTION INTRAMUSCULAR; INTRAVENOUS; SUBCUTANEOUS at 08:52

## 2023-10-12 RX ADMIN — PROPOFOL 100 MG: 10 INJECTION, EMULSION INTRAVENOUS at 07:39

## 2023-10-12 RX ADMIN — INSULIN HUMAN 2 UNITS: 100 INJECTION, SOLUTION PARENTERAL at 10:30

## 2023-10-12 RX ADMIN — METOCLOPRAMIDE 5 MG: 5 INJECTION, SOLUTION INTRAMUSCULAR; INTRAVENOUS at 08:41

## 2023-10-12 RX ADMIN — Medication 50 MCG: at 09:14

## 2023-10-12 RX ADMIN — HYDROMORPHONE HYDROCHLORIDE 0.2 MG: 2 INJECTION INTRAMUSCULAR; INTRAVENOUS; SUBCUTANEOUS at 08:09

## 2023-10-12 RX ADMIN — EPHEDRINE SULFATE 5 MG: 50 INJECTION INTRAVENOUS at 08:05

## 2023-10-12 RX ADMIN — ROCURONIUM BROMIDE 40 MG: 50 INJECTION, SOLUTION INTRAVENOUS at 07:39

## 2023-10-12 RX ADMIN — METHOCARBAMOL 500 MG: 500 TABLET ORAL at 20:38

## 2023-10-12 RX ADMIN — CEFAZOLIN 2 G: 1 INJECTION, POWDER, FOR SOLUTION INTRAMUSCULAR; INTRAVENOUS at 07:47

## 2023-10-12 RX ADMIN — Medication 50 MCG: at 08:30

## 2023-10-12 RX ADMIN — INSULIN LISPRO 4 UNITS: 100 INJECTION, SOLUTION INTRAVENOUS; SUBCUTANEOUS at 20:43

## 2023-10-12 RX ADMIN — Medication 50 MCG: at 09:21

## 2023-10-12 RX ADMIN — FENTANYL CITRATE 50 MCG: 50 INJECTION, SOLUTION INTRAMUSCULAR; INTRAVENOUS at 07:50

## 2023-10-12 RX ADMIN — Medication 50 MCG: at 07:41

## 2023-10-12 RX ADMIN — LIDOCAINE HYDROCHLORIDE 50 MG: 20 INJECTION, SOLUTION EPIDURAL; INFILTRATION; INTRACAUDAL at 07:39

## 2023-10-12 RX ADMIN — ACETAMINOPHEN 650 MG: 325 TABLET, FILM COATED ORAL at 06:37

## 2023-10-12 RX ADMIN — EPHEDRINE SULFATE 5 MG: 50 INJECTION INTRAVENOUS at 07:40

## 2023-10-12 RX ADMIN — SUGAMMADEX 200 MG: 100 INJECTION, SOLUTION INTRAVENOUS at 10:05

## 2023-10-12 RX ADMIN — EPHEDRINE SULFATE 5 MG: 50 INJECTION INTRAVENOUS at 08:14

## 2023-10-12 RX ADMIN — FENTANYL CITRATE 50 MCG: 50 INJECTION, SOLUTION INTRAMUSCULAR; INTRAVENOUS at 07:08

## 2023-10-12 RX ADMIN — Medication 100 MCG: at 07:44

## 2023-10-12 RX ADMIN — INSULIN LISPRO 5 UNITS: 100 INJECTION, SOLUTION INTRAVENOUS; SUBCUTANEOUS at 17:57

## 2023-10-12 RX ADMIN — SODIUM CHLORIDE, POTASSIUM CHLORIDE, SODIUM LACTATE AND CALCIUM CHLORIDE: 600; 310; 30; 20 INJECTION, SOLUTION INTRAVENOUS at 09:23

## 2023-10-12 RX ADMIN — OXYCODONE HYDROCHLORIDE 5 MG: 5 SOLUTION ORAL at 10:21

## 2023-10-12 RX ADMIN — EPHEDRINE SULFATE 10 MG: 50 INJECTION INTRAVENOUS at 07:42

## 2023-10-12 RX ADMIN — DEXAMETHASONE SODIUM PHOSPHATE 4 MG: 4 INJECTION INTRA-ARTICULAR; INTRALESIONAL; INTRAMUSCULAR; INTRAVENOUS; SOFT TISSUE at 07:47

## 2023-10-12 RX ADMIN — HYDRALAZINE HYDROCHLORIDE 5 MG: 20 INJECTION, SOLUTION INTRAMUSCULAR; INTRAVENOUS at 10:10

## 2023-10-12 RX ADMIN — ATORVASTATIN CALCIUM 40 MG: 40 TABLET, FILM COATED ORAL at 17:49

## 2023-10-12 RX ADMIN — Medication 50 MCG: at 09:00

## 2023-10-12 RX ADMIN — ROCURONIUM BROMIDE 10 MG: 50 INJECTION, SOLUTION INTRAVENOUS at 08:09

## 2023-10-12 RX ADMIN — CEFAZOLIN 2 G: 2 INJECTION, POWDER, FOR SOLUTION INTRAMUSCULAR; INTRAVENOUS at 16:40

## 2023-10-12 RX ADMIN — Medication 50 MCG: at 08:42

## 2023-10-12 RX ADMIN — Medication 25 MCG: at 08:22

## 2023-10-12 RX ADMIN — INSULIN GLARGINE-YFGN 14 UNITS: 100 INJECTION, SOLUTION SUBCUTANEOUS at 17:58

## 2023-10-12 RX ADMIN — ROCURONIUM BROMIDE 5 MG: 50 INJECTION, SOLUTION INTRAVENOUS at 09:47

## 2023-10-12 RX ADMIN — SODIUM CHLORIDE, POTASSIUM CHLORIDE, SODIUM LACTATE AND CALCIUM CHLORIDE: 600; 310; 30; 20 INJECTION, SOLUTION INTRAVENOUS at 07:00

## 2023-10-12 RX ADMIN — ROCURONIUM BROMIDE 5 MG: 50 INJECTION, SOLUTION INTRAVENOUS at 09:30

## 2023-10-12 RX ADMIN — METHOCARBAMOL 500 MG: 500 TABLET ORAL at 17:49

## 2023-10-12 RX ADMIN — VANCOMYCIN HYDROCHLORIDE 1000 MG: 5 INJECTION, POWDER, LYOPHILIZED, FOR SOLUTION INTRAVENOUS at 21:18

## 2023-10-12 RX ADMIN — EPHEDRINE SULFATE 10 MG: 50 INJECTION INTRAVENOUS at 07:46

## 2023-10-12 RX ADMIN — PROPOFOL 25 MG: 10 INJECTION, EMULSION INTRAVENOUS at 07:52

## 2023-10-12 RX ADMIN — EPHEDRINE SULFATE 5 MG: 50 INJECTION INTRAVENOUS at 08:28

## 2023-10-12 RX ADMIN — ONDANSETRON 4 MG: 2 INJECTION INTRAMUSCULAR; INTRAVENOUS at 09:51

## 2023-10-12 RX ADMIN — Medication 50 MCG: at 08:50

## 2023-10-12 RX ADMIN — DEXAMETHASONE SODIUM PHOSPHATE 3 MG: 4 INJECTION, SOLUTION INTRA-ARTICULAR; INTRALESIONAL; INTRAMUSCULAR; INTRAVENOUS; SOFT TISSUE at 07:08

## 2023-10-12 RX ADMIN — TRANEXAMIC ACID 1000 MG: 100 INJECTION, SOLUTION INTRAVENOUS at 09:46

## 2023-10-12 RX ADMIN — PROPOFOL 25 MG: 10 INJECTION, EMULSION INTRAVENOUS at 07:54

## 2023-10-12 RX ADMIN — METOPROLOL SUCCINATE 50 MG: 50 TABLET, EXTENDED RELEASE ORAL at 04:23

## 2023-10-12 RX ADMIN — INSULIN LISPRO 7 UNITS: 100 INJECTION, SOLUTION INTRAVENOUS; SUBCUTANEOUS at 17:55

## 2023-10-12 RX ADMIN — ENOXAPARIN SODIUM 40 MG: 100 INJECTION SUBCUTANEOUS at 17:49

## 2023-10-12 RX ADMIN — MIDAZOLAM 0.5 MG: 1 INJECTION, SOLUTION INTRAMUSCULAR; INTRAVENOUS at 07:08

## 2023-10-12 ASSESSMENT — PAIN DESCRIPTION - PAIN TYPE
TYPE: SURGICAL PAIN
TYPE: SURGICAL PAIN
TYPE: ACUTE PAIN;SURGICAL PAIN
TYPE: SURGICAL PAIN

## 2023-10-12 ASSESSMENT — PAIN SCALES - GENERAL: PAIN_LEVEL: 0

## 2023-10-12 NOTE — ANESTHESIA PROCEDURE NOTES
Peripheral IV    Date/Time: 10/12/2023 7:45 AM    Performed by: Panchito Bird M.D.  Authorized by: Panchito Bird M.D.    Size:  20 G  Laterality:  Left  Peripheral IV Location:  Forearm  Site Prep:  Alcohol  Technique:  Direct puncture  Attempts:  2   Aseptic technique.   Clear bio-occlusive dressing applied

## 2023-10-12 NOTE — ANESTHESIA PREPROCEDURE EVALUATION
Case: [id number] Date/Time: 10/12/23 0715    Procedure: ARTHROPLASTY, SHOULDER, TOTAL, REVERSE- RIGHT (Right)    Location: [facility] OR 12 / SURGERY [facility]    Surgeons: Robert Castellano M.D.      67 yo female    P Med Hx:    Diabetes   Hypertension  Hyperlipidemia  COPD (chronic obstructive pulmonary disease)   High cholesterol  Arthritis  GERD (gastroesophageal reflux disease)  Snoring  Glaucoma    P Surg Hx:  Jennifer  ORIF Right femur 2017  Vitrectomy right  ORIF Right femur   No reports of problems with previous anesthesia    + smoker  No current EtOH use reported    NPO    Relevant Problems   PULMONARY   (positive) COPD (chronic obstructive pulmonary disease) (HCC)      CARDIAC   (positive) Hypertension         (positive) JEN (acute kidney injury) ([facility])       Physical Exam    Airway   Mallampati: II  TM distance: >3 FB  Neck ROM: full       Cardiovascular - normal exam  Rhythm: regular  Rate: normal  (-) murmur     Dental - normal exam           Pulmonary - normal exam  Breath sounds clear to auscultation     Abdominal    Neurological - normal exam                 Anesthesia Plan    ASA 2       Plan - general and peripheral nerve block     Peripheral nerve block will be post-op pain control  Airway plan will be ETT    (Intrascalene nerve block)      Induction: intravenous    Postoperative Plan: Postoperative administration of opioids is intended.    Pertinent diagnostic labs and testing reviewed    Informed Consent:    Anesthetic plan and risks discussed with patient.    Use of blood products discussed with: patient whom consented to blood products.

## 2023-10-12 NOTE — ANESTHESIA PROCEDURE NOTES
Airway    Date/Time: 10/12/2023 7:40 AM    Performed by: Panchito Bird M.D.  Authorized by: Panchito Bird M.D.    Location:  OR  Urgency:  Elective  Indications for Airway Management:  Anesthesia      Spontaneous Ventilation: absent    Sedation Level:  Deep  Preoxygenated: Yes    Patient Position:  Sniffing  Final Airway Type:  Endotracheal airway  Final Endotracheal Airway:  ETT  Cuffed: Yes    Technique Used for Successful ETT Placement:  Direct laryngoscopy    Insertion Site:  Oral  Blade Type:  Rachel  Laryngoscope Blade/Videolaryngoscope Blade Size:  3  ETT Size (mm):  7.0  Measured from:  Lips  ETT to Lips (cm):  20  Placement Verified by: auscultation and capnometry    Cormack-Lehane Classification:  Grade I - full view of glottis  Number of Attempts at Approach:  1

## 2023-10-12 NOTE — OR NURSING
[facility] at bedside and fitted sling to patient  
Patient A+Ox4. Denies pain or nausea.  VSS. Right leg incision sites clean and dry with gauze dressing intact.  Ice pack to site.  Plan for patient to return to room.  Patient family not here at hospital will call daughter to update.  
Pre op done . FSBS 167 mg/dl and Tylenol MM given.   
Report taken from carmine wick . Informed her that patient needs EKG.     
Sling has been ordered from [facility] and should come to patient on floor today.  
ambulatory

## 2023-10-12 NOTE — OP REPORT
DATE OF SERVICE:  10/12/2023     PREOPERATIVE DIAGNOSIS:  Right comminuted displaced proximal humerus fracture.     POSTOPERATIVE DIAGNOSIS:  Right comminuted displaced proximal humerus   fracture.     PROCEDURE PERFORMED:  Open treatment of right proximal humerus fracture with   reverse total shoulder arthroplasty.     SURGEON:  Robert Castellano MD     ANESTHESIOLOGIST:  Panchito Bird MD     ANESTHESIA:  General and regional.     ASSISTANT:  Chaka Yates PA-C     ESTIMATED BLOOD LOSS:  150 mL     IMPLANTS:  Tornier reverse shoulder arthroplasty system with following   components:  1.  A 25 mm glenoid baseplate with a 9.5 mm central screw and two peripheral   locking screws.  2.  A 36 mm glenosphere.  3.  A 15 mm diameter 130 mm length humeral stem with a low offset baseplate   cup and a 36 mm, +6 mm polyethylene liner.     INDICATIONS FOR PROCEDURE:  The patient is 68 years old.  She was admitted on   10/07/2023 with a right intertrochanteric femur fracture as well as a right   displaced comminuted proximal humerus fracture.  I treated her with surgical   fixation for her intertrochanteric femur fracture on 10/08 and she was   readmitted to the hospitalist service, has been mobilizing in a shoulder sling   and given the results of her CT imaging, I felt that surgical fixation for   displaced fracture would have a reasonably high risk of failure, so I felt she   would benefit more from reverse shoulder arthroplasty.  I discussed this with   her preoperatively and she expressed understanding and wished to proceed with   right reverse total shoulder arthroplasty for definitive management of her   fracture.  She signed informed consent preoperatively and wished to proceed   with surgery as outlined above.     DESCRIPTION OF PROCEDURE:  The patient was met in the preoperative holding   area.  Her surgical site was signed.  Her consent was confirmed to be   accurate.  She was taken back to the operating room and  general anesthesia was   induced.  Dr. Bird had performed a regional anesthetic at my request to help   with postoperative pain control.  She was positioned in the beach chair   position and the right upper extremity was provisionally cleansed with   isopropyl alcohol and then prepped and draped in the usual sterile fashion.  A   formal timeout was performed to confirm patient's correct name, correct   surgical site, correct procedure and correct laterality.  The Trimano arm   marquez was utilized to position the arm.  A deltopectoral approach to the   proximal humerus was performed with a scalpel down through skin.  Dissection   was carried with Bovie cautery down through the subcutaneous planes.  The   deltopectoral interval was bluntly developed down to the fracture site.  I   identified the long head of the biceps tendon and performed a soft tissue   tenodesis to the pectoralis tendon with #2 FiberWire and then tenotomized the   long head of biceps proximally, followed that into the rotator interval and   releasing the rotator interval, exposing the humeral head.  There was intact   supraspinatus and infraspinatus tendon, so I felt I would osteotomize the   tuberosities for later repair.  So I used an osteotome to osteotomize the   greater tuberosity and lesser tuberosity from what was essentially an anatomic   neck fracture and then removed the head, which had some articular wear both   at the glenoid surface as well as the humeral head consistent with some   degenerative changes.  I then placed anterior and posterior glenoid retractors   and resected the stump of the long head of biceps tendon superiorly and the   glenoid labrum circumferentially to allow for exposure of the bony surface.  I   then positioned the 25 mm sizing guide and then inserted a guide pin at 0   degrees to the central portion of the glenoid. I then reamed down to bleeding   subchondral bone, drilled for the post and for the central screw  and tapped   for the central screw as well.  I thoroughly irrigated the wound and then   inserted a 25 mm baseplate with a 36x6.5 mm central screw.  Unfortunately, the   6.5 mm screw lost bony purchase, so we exchanged it for a 9.5 mm screw, which   had excellent bony purchase.  I then placed one superior and one inferior   glenoid locking screw and then used the peripheral reamer to ream additional   bone superiorly on the glenoid and thoroughly irrigated out the wound and   inserted the +3 mm x 36 mm glenosphere, impacted it into place and confirmed   it was well tightened and well seated.  I then turned my attention to the   humerus.  With proper retractors in place, I started sizing the humeral canal   with the sound and selected a 15 mm stem diameter.  We then ultimately   trialled and using about 30 degrees of retroversion and a +6 mm polyethylene   liner.  There was excellent stability parameters and good tension on the   deltoid and stable range of motion.  The wound was once again thoroughly   irrigated with normal saline after removing the trial components.  I placed a   total of 4 NiceLoop sutures around the posterior aspect of the greater   tuberosity and then inserted the component humeral stem and baseplate, trialed   again and selected a +6 mm polyethylene liner, impacted the baseplate into   place on the stem and the polyethylene liner into place and reduced the   shoulder.  The wound was thoroughly irrigated with normal saline.  I passed   the 2 NiceLoops around the neck of the stem to secure the greater tuberosity   down to the lateral portion of the stem and bone distally and then passed   additional 2 NiceLoops through the subscapularis tendon around the lesser   tuberosity and repaired those down to the greater tuberosity as well.  I then   repaired the remaining portion of the rotator cuff interval with interrupted   #2 FiberWire, closing down the joint.  I placed some vancomycin powder within    the joint as well as outside of the joint totaling 1 g after thoroughly   irrigating the wound once more with Pulsavac using normal saline and then I   repaired the deltopectoral interval loosely with 0 Vicryl, subcutaneous layers   with 0 Vicryl, 2-0 Vicryl and the skin edges with staples.  The wounds were   thoroughly cleansed and dried.  A sterile silver-impregnated Aquacel dressing   was applied.  She was then awoken from anesthesia and transferred on the   [facility] and taken to postanesthesia care unit in stable condition.     PLAN:    1.  The patient will be readmitted postoperatively.  2.  She can weightbear as tolerated on the right lower extremity.  3.  She should be nonweightbearing to the right upper extremity with a   shoulder abduction immobilizer sling.  4.  She should not perform any active shoulder range of motion for now, but   can start gentle elbow flexion and extension out of the sling and gentle   pendulum exercises as well.  5.  She will need the Ancef and vancomycin for 24 hours postop for infection   prophylaxis.  6.  She should work with physical and occupational therapy for mobilization   and gait training.  7.  It is okay to resume Lovenox or equivalent tomorrow morning from my   standpoint and should continue SCDs for DVT prophylaxis in the meantime.  8.  Ultimately, she will need to follow up with me 2 weeks postop for routine   wound check, staple removal and x-rays, 2 views of the right hip, AP pelvis   and 3 views of the right shoulder.        ______________________________  MD APURVA Valverde/JAIME    DD:  10/12/2023 10:29  DT:  10/12/2023 11:39    Job#:  [id number]

## 2023-10-12 NOTE — DISCHARGE PLANNING
Case Management Discharge Planning    Admission Date: 10/7/2023  GMLOS: 4.5  ALOS: 5    6-Clicks ADL Score: 14  6-Clicks Mobility Score: 11  PT and/or OT Eval ordered: Yes  Post-acute Referrals Ordered: Yes  Post-acute Choice Obtained: Yes  Has referral(s) been sent to post-acute provider:  Yes      Anticipated Discharge Dispo: Discharge Disposition: D/T to SNF with Medicare cert in anticipation of skilled care (03)    DME Needed: No    Action(s) Taken: OTHER    Patient discussed in IDT rounds. Patient is not medically cleared. Patient had total shoulder arthroplasty today. Pending post-op PT/OT recs. Patient has been accepted to [facility], and [facility]. Plan of care ongoing.    Escalations Completed: None    Medically Clear: No    Next Steps: Pending post-op therapy recommendations, medical clearance, SNF placement.    Barriers to Discharge: Medical clearance, Pending Placement, and Pending PT Evaluation    Is the patient up for discharge tomorrow: No

## 2023-10-12 NOTE — CARE PLAN
The patient is Stable - Low risk of patient condition declining or worsening    Shift Goals  Clinical Goals: pain mgmt  Patient Goals: rest  Family Goals: Not present    Progress made toward(s) clinical / shift goals:    Problem: Knowledge Deficit - Standard  Goal: Patient and family/care givers will demonstrate understanding of plan of care, disease process/condition, diagnostic tests and medications  Description: Target End Date:  1-3 days or as soon as patient condition allows    Document in Patient Education    1.  Patient and family/caregiver oriented to unit, equipment, visitation policy and means for communicating concern  2.  Complete/review Learning Assessment  3.  Assess knowledge level of disease process/condition, treatment plan, diagnostic tests and medications  4.  Explain disease process/condition, treatment plan, diagnostic tests and medications  Outcome: Progressing

## 2023-10-12 NOTE — PROGRESS NOTES
Pt arrived back on unit via hospital bed with transport without incidents. 2 RN skin check performed. Pt resting comfortably in bed. Family members at bedside.

## 2023-10-12 NOTE — ANESTHESIA TIME REPORT
Anesthesia Start and Stop Event Times     Date Time Event    10/12/2023 0646 Ready for Procedure     0730 Anesthesia Start     1019 Anesthesia Stop        Responsible Staff  10/12/23    Name Role Begin End    Panchito Bird M.D. Anesth 0730 1019        Overtime Reason:  no overtime (within assigned shift)    Comments:

## 2023-10-12 NOTE — PROGRESS NOTES
[facility] Medicine Daily Progress Note    Date of Service  10/12/2023    Chief Complaint  Miley Whittington is a 68 y.o. female admitted 10/7/2023 with ground-level fall  Hospital Course  68 female with past medical history of COPD, hypertension, hyperlipidemia presented after ground-level fall subsequent x-ray noted with comminuted and displaced multipart fracture of the right proximal humerus and nondisplaced intertrochanteric femur fracture.  CT of right shoulder showed comminuted humeral neck fracture with significant impaction and displacement  S/p Open treatment with intramedullary nailing, right   intertrochanteric femur fracture on 10/8.    Status post open treatment of right proximal humerus fracture with reverse total shoulder arthroplasty 10/12        Interval Problem Update  Patient was seen and examined at bedside.  I have personally reviewed and interpreted vitals, labs, and imaging.    10/10.  Afebrile.  Stable vitals.  On room air.  Leukocytosis at 11.9.  Urinalysis not suggestive of infection.  Procalcitonin within normal limits x2.  Chest x-ray with no acute cardiopulmonary disease.  Denies fever, chills, chest pains, shortness of breath, cough, congestion.  She does report some constipation.  10/11.  Afebrile.  Hypertensive this morning.  On room air.  Replete mag.  Denies fevers, chills, chest pain, shortness of breath.  Shoulder and hip pain are controlled.  Plan for reverse shoulder replacement tomorrow.  N.p.o. after midnight.  Discussed with orthopedic surgery.  10/12.  Afebrile.  Stable vitals.  On 0-2 L nasal cannula.  Denies fevers, chills, chest pains, shortness of breath.  Tolerated right shoulder surgery this morning.  Shoulder and hip pain are controlled.    I have discussed this patient's plan of care and discharge plan at IDT rounds today with Case Management, Nursing, Nursing leadership, and other members of the IDT team.    Consultants/Specialty  orthopedics    Code Status  Full  Code    Disposition  The patient is not medically cleared for discharge to home or a post-acute facility.  Anticipate discharge to: skilled nursing facility    I have placed the appropriate orders for post-discharge needs.    Review of Systems  Review of Systems   Musculoskeletal:  Positive for joint pain.        Physical Exam  Temp:  [36 °C (96.8 °F)-36.7 °C (98.1 °F)] 36.7 °C (98.1 °F)  Pulse:  [69-89] 88  Resp:  [16-20] 18  BP: (107-151)/(61-95) 112/70  SpO2:  [92 %-98 %] 97 %    Physical Exam  Musculoskeletal:      Comments: Range of motion limited in right hip due to pain         Fluids    Intake/Output Summary (Last 24 hours) at 10/12/2023 1318  Last data filed at 10/12/2023 1046  Gross per 24 hour   Intake 1640 ml   Output 150 ml   Net 1490 ml         Laboratory  Recent Labs     10/10/23  0234 10/11/23  0315 10/12/23  0425   WBC 11.9* 13.5* 12.8*   RBC 3.59* 3.63* 3.85*   HEMOGLOBIN 10.3* 10.6* 11.1*   HEMATOCRIT 31.8* 31.7* 33.3*   MCV 88.6 87.3 86.5   MCH 28.7 29.2 28.8   MCHC 32.4 33.4 33.3   RDW 41.7 40.2 40.6   PLATELETCT 156* 186 241   MPV 11.0 11.1 10.9       Recent Labs     10/11/23  0315 10/12/23  0425   SODIUM 134* 137   POTASSIUM 4.3 5.1   CHLORIDE 101 102   CO2 25 27   GLUCOSE 158* 124*   BUN 15 14   CREATININE 0.53 0.60   CALCIUM 9.0 9.4                     Imaging  DX-SHOULDER 2+ RIGHT   Final Result      Right shoulder arthroplasty without immediate postoperative hardware complication      DX-CHEST-LIMITED (1 VIEW)   Final Result         No acute cardiopulmonary abnormalities are identified.      DX-PORTABLE FLUOROSCOPY < 1 HOUR   Final Result      Portable fluoroscopy utilized for 26 seconds.         INTERPRETING LOCATION: [address] [place] [address] [place], [address]      DX-HIP-UNILATERAL-W/O PELVIS-2/3 VIEWS RIGHT   Final Result      Digitized intraoperative radiograph is submitted for review. This examination is not for diagnostic purpose but for guidance during a surgical procedure. Please see the  patient's chart for full procedural details.         INTERPRETING LOCATION: [facility], [place], [address]      CT-SHOULDER W/O PLUS RECONS RIGHT   Final Result      1.  Comminuted humeral neck fracture is identified with significant impaction and displacement at the fracture site as described above.      2.  No evidence of dislocation at the glenohumeral joint.      3.  No other fracture or malalignment is identified.      IN-VCQXPJC-WHAJUSP FILM X-RAY   Final Result      IO-MJRMTOS-OHTEBIX FILM X-RAY   Final Result      OUTSIDE IMAGES-DX CHEST   Final Result             Assessment/Plan  * Femoral neck fracture (HCC)  Assessment & Plan  10/12/2023  S/p Open treatment with intramedullary nailing, right   intertrochanteric femur fracture.  PT/OT eval  Pain management, on IV narcotics, monitor for toxicity    Bradycardia  Assessment & Plan  10/12/2023  Bradycardia resolved  Resume metoprolol, decrease dose to 50 XL    Leucocytosis  Assessment & Plan  10/12/2023  Likely stress-induced  No concern of ongoing infection  Denies cough, dysuria remained afebrile  Urinalysis not concerning for infection  Chest x-ray unremarkable, procalcitonin negative x2  Monitor off antibiotics    ACP (advance care planning)  Assessment & Plan  Full code    Hyperlipidemia  Assessment & Plan  10/12/2023  Continue lipitor    Hypertension  Assessment & Plan  10/12/2023  Continue home medications    Diabetes (HCC)  Assessment & Plan  10/12/2023  Sliding scale         VTE prophylaxis: lovenox    I have performed a physical exam and reviewed and updated ROS and Plan today (10/12/2023). In review of yesterday's note (10/11/2023), there are no changes except as documented above.         Greater than 51 minutes spent prepping to see patient (e.g. review of tests) obtaining and/or reviewing separately obtained history. Performing a medically appropriate examination and/ evaluation.  Counseling and educating the patient/family/caregiver.  Ordering  medications, tests, or procedures.  Referring and communicating with other health care professionals.  Documenting clinical information in EPIC.  Independently interpreting results and communicating results to patient/family/caregiver.  Care coordination.

## 2023-10-12 NOTE — PROGRESS NOTES
Ultra shoulder sling has been faxed to [facility].Any questions or concerns please call [facility] at

## 2023-10-12 NOTE — OR SURGEON
Immediate Post OP Note    PreOp Diagnosis: Right comminuted proximal humerus fracture      PostOp Diagnosis: same      Procedure(s):  ARTHROPLASTY, SHOULDER, TOTAL, REVERSE- RIGHT - Wound Class: Clean    Surgeon(s):  Robert Castellano M.D.    Anesthesiologist/Type of Anesthesia:  Anesthesiologist: Panchito Bird M.D./General    Surgical Staff:  Circulator: Erika Truong R.N.; Nita Tracey R.N.  Relief Scrub: Erin Banegas  Scrub Person: Sofi Rucker; Chad Shaver  First Assist: Chaka Yates P.A.-C.    Specimens removed if any:  * No specimens in log *    Estimated Blood Loss: 150cc    Findings: see dictation    Complications: none known    PLAN:  --readmit postop  --WBAT RLE, NWB RUE with shoulder immobilizer  --ancef/vanc x 24hrs postop  --PT/OT for mobilization ASAP  --okay to resume lovenox or equivalent tomorrow am, continue SCDs  --fu 2 weeks postop        10/12/2023 10:16 AM Robert Castellano M.D.

## 2023-10-12 NOTE — PROGRESS NOTES
Report received from day shift RN, assumed Care.   Patient is AOx4, responds appropriately.      Pain controlled at this time.  Patient is tolerating diabetic diet (to be NPO @0000), denies nausea/vomiting. + flatus. Sling to RUE.  Up x2 assist with steady gait.    Plan of care discussed, all questions answered.    Educated on use of call light and importance of calling before getting out of bed. Pt verbalizes understanding.    Call light and belongings within reach, treaded slipper socks on, bed in lowest locked position.  All needs met at this time.

## 2023-10-12 NOTE — PROGRESS NOTES
68yoF with right IT femur fx s/p IMN 10/8 and right proximal humerus fracture.    S: NPO awaiting surgery, doing okay, no complaints    O:    Vitals:    10/12/23 0651   BP: (!) 151/75   Pulse: 84   Resp: 18   Temp: 36 °C (96.8 °F)   SpO2: 94%     Exam:  General-NAD, alert and following commands  RUE-sling in place, SILT AX/M/R/U dist, +AIN/PIN/M/R/U motor, palp radial pulse  RLE-thigh dressings c/d/I, NVI distally    Hct 33.3    A: 68yoF with right IT femur fx s/p IMN 10/8 and right proximal humerus fracture.    Recs:  --WBAT RLE  --planning right reverse shoulder arthroplasty for comminuted proximal humerus fx this am

## 2023-10-12 NOTE — ANESTHESIA PROCEDURE NOTES
Peripheral Block    Date/Time: 10/12/2023 7:08 AM    Performed by: Panchito Bird M.D.  Authorized by: Panchito Bird M.D.    Patient Location:  Pre-op  Start Time:  10/12/2023 7:08 AM  End Time:  10/12/2023 7:12 AM  Reason for Block: at surgeon's request and post-op pain management ONLY    patient identified, IV checked, site marked, risks and benefits discussed, surgical consent, monitors and equipment checked, pre-op evaluation and timeout performed    Patient Position:  Supine  Prep: ChloraPrep    Monitoring:  Heart rate, continuous pulse ox and cardiac monitor  Block Region:  Upper Extremity  Upper Extremity - Block Type:  BRACHIAL PLEXUS block, Interscalene approach    Laterality:  Right  Procedures: ultrasound guided  Image captured, interpreted and electronically stored.  Local Infiltration:  Lidocaine  Strength:  1 %  Dose:  3 ml  Block Type:  Single-shot  Needle Length:  50mm  Needle Gauge:  22 G  Needle Localization:  Ultrasound guidance  Injection Assessment:  Negative aspiration for heme, no paresthesia on injection, incremental injection and local visualized surrounding nerve on ultrasound  Evidence of intravascular injection: No     Patient awake and talking throughout.  Nasal Canula O2 2liters/min.  No complaints.  No apparent complications.  Needle tip + C5/C6 nerve roots well visualized,  Negative aspirates.  Low pressure incremental perineural injections

## 2023-10-12 NOTE — ANESTHESIA POSTPROCEDURE EVALUATION
Patient: Miley Whittington    Procedure Summary     Date: 10/12/23 Room / Location: [facility] 12 / SURGERY [facility]    Anesthesia Start: 0730 Anesthesia Stop: 1019    Procedure: ARTHROPLASTY, SHOULDER, TOTAL, REVERSE- RIGHT (Right: Shoulder) Diagnosis: (right comminuted proximal humerus fracture )    Surgeons: Robert Castellano M.D. Responsible Provider: Panchito Bird M.D.    Anesthesia Type: general, peripheral nerve block ASA Status: 2          Final Anesthesia Type: general, peripheral nerve block  Last vitals  BP   Blood Pressure : (!) 151/75    Temp   36 °C (96.8 °F)    Pulse   84   Resp   18    SpO2   94 %      Anesthesia Post Evaluation    Patient location during evaluation: PACU  Patient participation: complete - patient participated  Level of consciousness: awake and alert  Pain score: 0    Airway patency: patent  Anesthetic complications: no  Cardiovascular status: hemodynamically stable  Respiratory status: acceptable  Hydration status: euvolemic    PONV: none          There were no known notable events for this encounter.     Nurse Pain Score: 0 (NPRS)

## 2023-10-13 LAB
ANION GAP SERPL CALC-SCNC: 8 MMOL/L (ref 7–16)
BASOPHILS # BLD AUTO: 0.2 % (ref 0–1.8)
BASOPHILS # BLD: 0.04 K/UL (ref 0–0.12)
BUN SERPL-MCNC: 18 MG/DL (ref 8–22)
CALCIUM SERPL-MCNC: 8.8 MG/DL (ref 8.5–10.5)
CHLORIDE SERPL-SCNC: 102 MMOL/L (ref 96–112)
CO2 SERPL-SCNC: 25 MMOL/L (ref 20–33)
CREAT SERPL-MCNC: 0.69 MG/DL (ref 0.5–1.4)
EOSINOPHIL # BLD AUTO: 0.1 K/UL (ref 0–0.51)
EOSINOPHIL NFR BLD: 0.6 % (ref 0–6.9)
ERYTHROCYTE [DISTWIDTH] IN BLOOD BY AUTOMATED COUNT: 42.2 FL (ref 35.9–50)
GFR SERPLBLD CREATININE-BSD FMLA CKD-EPI: 93 ML/MIN/1.73 M 2
GLUCOSE BLD STRIP.AUTO-MCNC: 128 MG/DL (ref 65–99)
GLUCOSE BLD STRIP.AUTO-MCNC: 156 MG/DL (ref 65–99)
GLUCOSE BLD STRIP.AUTO-MCNC: 226 MG/DL (ref 65–99)
GLUCOSE BLD STRIP.AUTO-MCNC: 261 MG/DL (ref 65–99)
GLUCOSE BLD STRIP.AUTO-MCNC: 285 MG/DL (ref 65–99)
GLUCOSE SERPL-MCNC: 130 MG/DL (ref 65–99)
HCT VFR BLD AUTO: 29.4 % (ref 37–47)
HGB BLD-MCNC: 9.6 G/DL (ref 12–16)
IMM GRANULOCYTES # BLD AUTO: 0.15 K/UL (ref 0–0.11)
IMM GRANULOCYTES NFR BLD AUTO: 0.9 % (ref 0–0.9)
LYMPHOCYTES # BLD AUTO: 2.45 K/UL (ref 1–4.8)
LYMPHOCYTES NFR BLD: 15.1 % (ref 22–41)
MAGNESIUM SERPL-MCNC: 1.9 MG/DL (ref 1.5–2.5)
MCH RBC QN AUTO: 29.3 PG (ref 27–33)
MCHC RBC AUTO-ENTMCNC: 32.7 G/DL (ref 32.2–35.5)
MCV RBC AUTO: 89.6 FL (ref 81.4–97.8)
MONOCYTES # BLD AUTO: 1.95 K/UL (ref 0–0.85)
MONOCYTES NFR BLD AUTO: 12 % (ref 0–13.4)
NEUTROPHILS # BLD AUTO: 11.55 K/UL (ref 1.82–7.42)
NEUTROPHILS NFR BLD: 71.2 % (ref 44–72)
NRBC # BLD AUTO: 0 K/UL
NRBC BLD-RTO: 0 /100 WBC (ref 0–0.2)
PHOSPHATE SERPL-MCNC: 3.6 MG/DL (ref 2.5–4.5)
PLATELET # BLD AUTO: 239 K/UL (ref 164–446)
PMV BLD AUTO: 10.6 FL (ref 9–12.9)
POTASSIUM SERPL-SCNC: 4.8 MMOL/L (ref 3.6–5.5)
PROCALCITONIN SERPL-MCNC: 0.12 NG/ML
RBC # BLD AUTO: 3.28 M/UL (ref 4.2–5.4)
SODIUM SERPL-SCNC: 135 MMOL/L (ref 135–145)
WBC # BLD AUTO: 16.2 K/UL (ref 4.8–10.8)

## 2023-10-13 PROCEDURE — 770001 HCHG ROOM/CARE - MED/SURG/GYN PRIV*

## 2023-10-13 PROCEDURE — 82962 GLUCOSE BLOOD TEST: CPT | Mod: 91

## 2023-10-13 PROCEDURE — 84100 ASSAY OF PHOSPHORUS: CPT

## 2023-10-13 PROCEDURE — 700111 HCHG RX REV CODE 636 W/ 250 OVERRIDE (IP): Mod: JZ | Performed by: STUDENT IN AN ORGANIZED HEALTH CARE EDUCATION/TRAINING PROGRAM

## 2023-10-13 PROCEDURE — 36415 COLL VENOUS BLD VENIPUNCTURE: CPT

## 2023-10-13 PROCEDURE — 700111 HCHG RX REV CODE 636 W/ 250 OVERRIDE (IP): Performed by: ORTHOPAEDIC SURGERY

## 2023-10-13 PROCEDURE — 700102 HCHG RX REV CODE 250 W/ 637 OVERRIDE(OP): Performed by: STUDENT IN AN ORGANIZED HEALTH CARE EDUCATION/TRAINING PROGRAM

## 2023-10-13 PROCEDURE — 80048 BASIC METABOLIC PNL TOTAL CA: CPT

## 2023-10-13 PROCEDURE — 700105 HCHG RX REV CODE 258: Performed by: ORTHOPAEDIC SURGERY

## 2023-10-13 PROCEDURE — A9270 NON-COVERED ITEM OR SERVICE: HCPCS | Performed by: INTERNAL MEDICINE

## 2023-10-13 PROCEDURE — 97530 THERAPEUTIC ACTIVITIES: CPT | Mod: CQ

## 2023-10-13 PROCEDURE — 84145 PROCALCITONIN (PCT): CPT

## 2023-10-13 PROCEDURE — A9270 NON-COVERED ITEM OR SERVICE: HCPCS | Performed by: STUDENT IN AN ORGANIZED HEALTH CARE EDUCATION/TRAINING PROGRAM

## 2023-10-13 PROCEDURE — 700102 HCHG RX REV CODE 250 W/ 637 OVERRIDE(OP): Performed by: INTERNAL MEDICINE

## 2023-10-13 PROCEDURE — 83735 ASSAY OF MAGNESIUM: CPT

## 2023-10-13 PROCEDURE — 97535 SELF CARE MNGMENT TRAINING: CPT

## 2023-10-13 PROCEDURE — 99233 SBSQ HOSP IP/OBS HIGH 50: CPT | Performed by: INTERNAL MEDICINE

## 2023-10-13 PROCEDURE — 85025 COMPLETE CBC W/AUTO DIFF WBC: CPT

## 2023-10-13 RX ORDER — MORPHINE SULFATE 4 MG/ML
4 INJECTION INTRAVENOUS
Status: DISCONTINUED | OUTPATIENT
Start: 2023-10-13 | End: 2023-10-18 | Stop reason: HOSPADM

## 2023-10-13 RX ORDER — OXYCODONE HYDROCHLORIDE 5 MG/1
5 TABLET ORAL
Status: DISCONTINUED | OUTPATIENT
Start: 2023-10-13 | End: 2023-10-18 | Stop reason: HOSPADM

## 2023-10-13 RX ORDER — OXYCODONE HYDROCHLORIDE 10 MG/1
10 TABLET ORAL
Status: DISCONTINUED | OUTPATIENT
Start: 2023-10-13 | End: 2023-10-18 | Stop reason: HOSPADM

## 2023-10-13 RX ADMIN — INSULIN GLARGINE-YFGN 14 UNITS: 100 INJECTION, SOLUTION SUBCUTANEOUS at 16:56

## 2023-10-13 RX ADMIN — OXYCODONE 5 MG: 5 TABLET ORAL at 11:37

## 2023-10-13 RX ADMIN — LISINOPRIL 20 MG: 20 TABLET ORAL at 05:04

## 2023-10-13 RX ADMIN — MORPHINE SULFATE 3 MG: 4 INJECTION, SOLUTION INTRAMUSCULAR; INTRAVENOUS at 05:04

## 2023-10-13 RX ADMIN — INSULIN LISPRO 4 UNITS: 100 INJECTION, SOLUTION INTRAVENOUS; SUBCUTANEOUS at 20:25

## 2023-10-13 RX ADMIN — ENOXAPARIN SODIUM 40 MG: 100 INJECTION SUBCUTANEOUS at 16:52

## 2023-10-13 RX ADMIN — METOPROLOL SUCCINATE 50 MG: 50 TABLET, EXTENDED RELEASE ORAL at 05:02

## 2023-10-13 RX ADMIN — METHOCARBAMOL 500 MG: 500 TABLET ORAL at 09:27

## 2023-10-13 RX ADMIN — INSULIN LISPRO 5 UNITS: 100 INJECTION, SOLUTION INTRAVENOUS; SUBCUTANEOUS at 06:00

## 2023-10-13 RX ADMIN — MORPHINE SULFATE 3 MG: 4 INJECTION, SOLUTION INTRAMUSCULAR; INTRAVENOUS at 09:27

## 2023-10-13 RX ADMIN — OXYCODONE HYDROCHLORIDE 10 MG: 10 TABLET ORAL at 17:49

## 2023-10-13 RX ADMIN — CEFAZOLIN 2 G: 2 INJECTION, POWDER, FOR SOLUTION INTRAMUSCULAR; INTRAVENOUS at 00:49

## 2023-10-13 RX ADMIN — METHOCARBAMOL 500 MG: 500 TABLET ORAL at 20:24

## 2023-10-13 RX ADMIN — INSULIN LISPRO 7 UNITS: 100 INJECTION, SOLUTION INTRAVENOUS; SUBCUTANEOUS at 16:54

## 2023-10-13 RX ADMIN — METHOCARBAMOL 500 MG: 500 TABLET ORAL at 16:52

## 2023-10-13 RX ADMIN — METHOCARBAMOL 500 MG: 500 TABLET ORAL at 14:39

## 2023-10-13 RX ADMIN — ATORVASTATIN CALCIUM 40 MG: 40 TABLET, FILM COATED ORAL at 16:52

## 2023-10-13 RX ADMIN — OXYCODONE HYDROCHLORIDE 10 MG: 10 TABLET ORAL at 14:30

## 2023-10-13 ASSESSMENT — PAIN DESCRIPTION - PAIN TYPE
TYPE: ACUTE PAIN;SURGICAL PAIN

## 2023-10-13 ASSESSMENT — COGNITIVE AND FUNCTIONAL STATUS - GENERAL
SUGGESTED CMS G CODE MODIFIER DAILY ACTIVITY: CK
MOVING FROM LYING ON BACK TO SITTING ON SIDE OF FLAT BED: UNABLE
CLIMB 3 TO 5 STEPS WITH RAILING: TOTAL
DRESSING REGULAR UPPER BODY CLOTHING: A LOT
MOVING TO AND FROM BED TO CHAIR: UNABLE
TOILETING: A LOT
MOBILITY SCORE: 10
SUGGESTED CMS G CODE MODIFIER MOBILITY: CL
TURNING FROM BACK TO SIDE WHILE IN FLAT BAD: A LOT
HELP NEEDED FOR BATHING: A LOT
PERSONAL GROOMING: A LITTLE
WALKING IN HOSPITAL ROOM: A LOT
STANDING UP FROM CHAIR USING ARMS: A LITTLE
DAILY ACTIVITIY SCORE: 15
DRESSING REGULAR LOWER BODY CLOTHING: A LOT

## 2023-10-13 NOTE — THERAPY
"Physical Therapy   Daily Treatment     Patient Name: Miley Whittington  Age:  68 y.o., Sex:  female  Medical Record #: [id number]  Today's Date: 10/13/2023     Precautions  Precautions: Fall Risk;Weight Bearing As Tolerated Right Lower Extremity;Non Weight Bearing Right Upper Extremity;Sling Right Upper Extremity  Comments: right humeral neck fracture s/p reverse total shoulder arthroplasty, shoulder abduction immobilizer sling. right intertrochanteric femur fracture. s/p ORIF. \"She should not perform any active shoulder range of motion for now, but   can start gentle elbow flexion and extension out of the sling and gentle pendulum exercises as well.\" per Nona 10/12 post op note.    Assessment    Pt greeted and seen for PT treatment. Pt req'd ModA for bed mobility and Mod HHA to stand at EOB. Once up pt was able to take steps w/ hemiwalker and Marjan to transfer to chair, increased difficultly to weight shift for offloading to step. Good carryover for sequencing with gema walker. Pt currently limited by impaired balance 2/2 pain and WB status, decreased strength and decreased activity tolerance which negatively impacts functional mobility. Pt will continue to benefit from skilled PT to address deficits.       Plan    Treatment Plan Status: Continue Current Treatment Plan  Type of Treatment: Bed Mobility, Equipment, Gait Training, Neuro Re-Education / Balance, Therapeutic Activities, Therapeutic Exercise  Treatment Frequency: 4 Times per Week  Treatment Duration: Until Therapy Goals Met    DC Equipment Recommendations: Unable to determine at this time (possibly wheelchair with leg rests and removeable arm rests, quad cane-TBD)  Discharge Recommendations: Recommend post-acute placement for additional physical therapy services prior to discharge home       10/13/23 1128   Cognition    Comments very pleasant, cooperative   Balance   Sitting Balance (Static) Fair   Sitting Balance (Dynamic) Fair   Standing Balance " (Static) Fair -   Standing Balance (Dynamic) Poor +   Weight Shift Sitting Fair   Weight Shift Standing Poor   Skilled Intervention Verbal Cuing;Sequencing;Facilitation   Comments w/ gema walker   Bed Mobility    Supine to Sit Moderate Assist   Scooting Minimal Assist   Skilled Intervention Verbal Cuing;Facilitation   Comments HOB raised   Gait Analysis   Comments not performed this session   Functional Mobility   Sit to Stand Moderate Assist   Bed, Chair, Wheelchair Transfer Minimal Assist   Transfer Method Stand Step   Mobility bed>chair   Skilled Intervention Verbal Cuing;Sequencing;Compensatory Strategies   Comments increased difficultly for weight shifting to offload and step. Req'd ModA HHA to stand then used gema walker to takes steps to chair.   Short Term Goals    Short Term Goal # 1 supine<>sit min A in 6 visits to progress bed mobility   Goal Outcome # 1 Progressing as expected   Short Term Goal # 2 EOB<>WC with slide board or squat pivot min A in 6 visits for progressing transfers   Goal Outcome # 2 Goal met   Short Term Goal # 3 WC propulsion and management 100ft supervised in 6 visits for household mobility   Goal Outcome # 3 Goal not met   Short Term Goal # 4 sit to stand with hemiwalker mod A in 6 visits to progress with standing transfers   Goal Outcome # 4 Progressing as expected   Supervising Physical Therapist (PTA Treatments Only)   Supervising Physical Therapist Vannessa Shaver

## 2023-10-13 NOTE — CARE PLAN
The patient is Stable - Low risk of patient condition declining or worsening    Shift Goals  Clinical Goals: pain management, rest  Patient Goals: rest  Family Goals: updated on POC      Problem: Knowledge Deficit - Standard  Goal: Patient and family/care givers will demonstrate understanding of plan of care, disease process/condition, diagnostic tests and medications  Outcome: Progressing  Note: Patient educated on plan and goals of care and disease process. Education provided on medications, procedures, and equipment. Will continue to re-enforce when required. All questions and concerns answered at this time.      Problem: Pain - Standard  Goal: Alleviation of pain or a reduction in pain to the patient’s comfort goal  Outcome: Progressing  Note: Educated patient on the use of 0-10 pain scale and use of pain descriptors. Administered pain medication when needed per MAR. Non-pharmacological for pain control such as rest, repositioning, and enforcing a calm and conductive environment.      Problem: Fall Risk  Goal: Patient will remain free from falls  Outcome: Progressing  Note: Bed in lowest and locked position, call light is within reach, bed alarm is on, treaded socks in place. Patient oriented to room, POC and educated to use call light for assistance. Patient educated to not get out of bed without staff present.

## 2023-10-13 NOTE — PROGRESS NOTES
[facility] Medicine Daily Progress Note    Date of Service  10/13/2023    Chief Complaint  Miley Whittington is a 68 y.o. female admitted 10/7/2023 with ground-level fall  Hospital Course  68 female with past medical history of COPD, hypertension, hyperlipidemia presented after ground-level fall subsequent x-ray noted with comminuted and displaced multipart fracture of the right proximal humerus and nondisplaced intertrochanteric femur fracture.  CT of right shoulder showed comminuted humeral neck fracture with significant impaction and displacement  S/p Open treatment with intramedullary nailing, right   intertrochanteric femur fracture on 10/8.    Status post open treatment of right proximal humerus fracture with reverse total shoulder arthroplasty 10/12        Interval Problem Update  Patient was seen and examined at bedside.  I have personally reviewed and interpreted vitals, labs, and imaging.    10/10.  Afebrile.  Stable vitals.  On room air.  Leukocytosis at 11.9.  Urinalysis not suggestive of infection.  Procalcitonin within normal limits x2.  Chest x-ray with no acute cardiopulmonary disease.  Denies fever, chills, chest pains, shortness of breath, cough, congestion.  She does report some constipation.  10/11.  Afebrile.  Hypertensive this morning.  On room air.  Replete mag.  Denies fevers, chills, chest pain, shortness of breath.  Shoulder and hip pain are controlled.  Plan for reverse shoulder replacement tomorrow.  N.p.o. after midnight.  Discussed with orthopedic surgery.  10/12.  Afebrile.  Stable vitals.  On 0-2 L nasal cannula.  Denies fevers, chills, chest pains, shortness of breath.  Tolerated right shoulder surgery this morning.  Shoulder and hip pain are controlled.  10/13.  Afebrile.  Stable vitals.  On room air.  Leukocytosis now trending up to 16.2.  Procalcitonin still normal.  Per orthopedic surgery patient is on perioperative cefazolin and vancomycin.  Hemoglobin dropped from 11.1 to 9.6.   No obvious bleeding/bruising.  Replete mag.  Denies fever, chills, chest pain, shortness of breath.  Right shoulder pain hurts little more than hip.    I have discussed this patient's plan of care and discharge plan at IDT rounds today with Case Management, Nursing, Nursing leadership, and other members of the IDT team.    Consultants/Specialty  orthopedics    Code Status  Full Code    Disposition  The patient is not medically cleared for discharge to home or a post-acute facility.  Anticipate discharge to: skilled nursing facility    I have placed the appropriate orders for post-discharge needs.    Review of Systems  Review of Systems   Musculoskeletal:  Positive for joint pain.        Physical Exam  Temp:  [36 °C (96.8 °F)-36.7 °C (98.1 °F)] 36.3 °C (97.3 °F)  Pulse:  [72-90] 80  Resp:  [16-20] 16  BP: (108-149)/(60-78) 112/60  SpO2:  [89 %-100 %] 90 %    Physical Exam  Musculoskeletal:      Comments: Range of motion limited in right hip due to pain         Fluids    Intake/Output Summary (Last 24 hours) at 10/13/2023 1037  Last data filed at 10/13/2023 0854  Gross per 24 hour   Intake 600 ml   Output 1300 ml   Net -700 ml         Laboratory  Recent Labs     10/11/23  0315 10/12/23  0425 10/13/23  0257   WBC 13.5* 12.8* 16.2*   RBC 3.63* 3.85* 3.28*   HEMOGLOBIN 10.6* 11.1* 9.6*   HEMATOCRIT 31.7* 33.3* 29.4*   MCV 87.3 86.5 89.6   MCH 29.2 28.8 29.3   MCHC 33.4 33.3 32.7   RDW 40.2 40.6 42.2   PLATELETCT 186 241 239   MPV 11.1 10.9 10.6       Recent Labs     10/11/23  0315 10/12/23  0425 10/13/23  0257   SODIUM 134* 137 135   POTASSIUM 4.3 5.1 4.8   CHLORIDE 101 102 102   CO2 25 27 25   GLUCOSE 158* 124* 130*   BUN 15 14 18   CREATININE 0.53 0.60 0.69   CALCIUM 9.0 9.4 8.8                     Imaging  DX-SHOULDER 2+ RIGHT   Final Result      Right shoulder arthroplasty without immediate postoperative hardware complication      DX-CHEST-LIMITED (1 VIEW)   Final Result         No acute cardiopulmonary abnormalities  are identified.      DX-PORTABLE FLUOROSCOPY < 1 HOUR   Final Result      Portable fluoroscopy utilized for 26 seconds.         INTERPRETING LOCATION: [address], [place], [address]      DX-HIP-UNILATERAL-W/O PELVIS-2/3 VIEWS RIGHT   Final Result      Digitized intraoperative radiograph is submitted for review. This examination is not for diagnostic purpose but for guidance during a surgical procedure. Please see the patient's chart for full procedural details.         INTERPRETING LOCATION: [address], [place], [address]      CT-SHOULDER W/O PLUS RECONS RIGHT   Final Result      1.  Comminuted humeral neck fracture is identified with significant impaction and displacement at the fracture site as described above.      2.  No evidence of dislocation at the glenohumeral joint.      3.  No other fracture or malalignment is identified.      [id number] FILM X-RAY   Final Result      BY-[id number] FILM X-RAY   Final Result      OUTSIDE IMAGES-DX CHEST   Final Result             Assessment/Plan  * Femoral neck fracture (HCC)  Assessment & Plan  10/13/2023  S/p Open treatment with intramedullary nailing, right   intertrochanteric femur fracture.  PT/OT eval  Pain management, on IV narcotics, monitor for toxicity    Bradycardia  Assessment & Plan  10/13/2023  Bradycardia resolved  Resume metoprolol, decrease dose to 50 XL    Leucocytosis  Assessment & Plan  10/13/2023  Likely stress-induced  No concern of ongoing infection  Denies cough, dysuria remained afebrile  Urinalysis not concerning for infection  Chest x-ray unremarkable, procalcitonin negative x2    Perioperative vancomycin and cefazolin per Ortho    ACP (advance care planning)  Assessment & Plan  Full code    Hyperlipidemia  Assessment & Plan  10/13/2023  Continue lipitor    Hypertension  Assessment & Plan  10/13/2023  Continue home medications    Diabetes (HCC)  Assessment & Plan  10/13/2023  Sliding scale         VTE prophylaxis: lovenox    I have  performed a physical exam and reviewed and updated ROS and Plan today (10/13/2023). In review of yesterday's note (10/12/2023), there are no changes except as documented above.         Greater than 51 minutes spent prepping to see patient (e.g. review of tests) obtaining and/or reviewing separately obtained history. Performing a medically appropriate examination and/ evaluation.  Counseling and educating the patient/family/caregiver.  Ordering medications, tests, or procedures.  Referring and communicating with other health care professionals.  Documenting clinical information in EPIC.  Independently interpreting results and communicating results to patient/family/caregiver.  Care coordination.

## 2023-10-13 NOTE — PROGRESS NOTES
Received in bed aaox4, c/o right shoulder and hip pain, prn meds per mar given, left hip dressing CDI, right shoulder sling, POC discussed, PT/OT eval, needs attended.

## 2023-10-13 NOTE — PROGRESS NOTES
68yoF with right IT femur fx s/p IMN 10/8 and right proximal humerus fracture s/p reverse TSA 10/12.    S: Having some pain but overall says she is doing okay    O:    Vitals:    10/13/23 1303   BP: 128/62   Pulse: 80   Resp: 16   Temp: 36.4 °C (97.5 °F)   SpO2: 97%     Exam:  General-NAD, alert and following commands  RUE-sling in place, SILT AX/M/R/U dist, +AIN/PIN/M/R/U motor, palp radial pulse  RLE-thigh dressings c/d/I, NVI distally    Hct 29.4    A: 68yoF with right IT femur fx s/p IMN 10/8 and right proximal humerus fracture s/p reverse TSA 10/12.    Recs:  --WBAT RLE, NWB RUE with shoulder immobilizer  --okay for right elbow ROM and pendulum exercises right shoulder, no AROM shoulder, no ER past neutral for now  --continue PT/OT for mobilization   --continue lovenox and SCDs while inpatient  --fu 2 weeks postop

## 2023-10-13 NOTE — CARE PLAN
Problem: Knowledge Deficit - Standard  Goal: Patient and family/care givers will demonstrate understanding of plan of care, disease process/condition, diagnostic tests and medications  10/13/2023 0828 by Ernesto Delos Reyes V, R.N.  Outcome: Progressing  Note: PT/OT, pain control  10/13/2023 0828 by Ernesto Delos Reyes V, R.N.  Outcome: Progressing  Note: PT/     Problem: Pain - Standard  Goal: Alleviation of pain or a reduction in pain to the patient’s comfort goal  Outcome: Progressing  Note: Prn meds per MAR   The patient is Stable - Low risk of patient condition declining or worsening    Shift Goals  Clinical Goals: Pain control, blood sugar control, safety  Patient Goals: Pain control, comfort, rest  Family Goals: N/A    Progress made toward(s) clinical / shift goals:  pt/ot, pain control    Patient is not progressing towards the following goals:

## 2023-10-13 NOTE — THERAPY
Occupational Therapy  Daily Treatment     Patient Name: Miley Whittington  Age:  68 y.o., Sex:  female  Medical Record #: [id number]  Today's Date: 10/13/2023     Precautions  Precautions: Fall Risk, Weight Bearing As Tolerated Right Lower Extremity, Non Weight Bearing Right Upper Extremity, Immobilizer Right Upper Extremity  Comments: pendulum and elbow ROM okay    Assessment    Pt seen for OT treatment. Pt s/p right reverse TSA on 10/12. Pt in shoulder immobilizer with abduction pillow. Pt cleared for gentle pendulums and elbow ROM. Pt able to participate in R elbow, wrist and hand ROM. She was not able to participate in pendulums due to inability to get into a good position for trunk flexion to allow for pendulums. Pt was educated on NWB RUE, donning/doffing shoulder immobilizer, no active movement to R shoulder, and AROM to right elbow, wrist and hand. She verbalized understanding.     Recommend post-acute placement.     Plan    Treatment Plan Status: Continue Current Treatment Plan  Type of Treatment: Self Care / Activities of Daily Living, Adaptive Equipment, Neuro Re-Education / Balance, Therapeutic Exercises, Therapeutic Activity  Treatment Frequency: 3 Times per Week  Treatment Duration: Until Therapy Goals Met    DC Equipment Recommendations: Unable to determine at this time  Discharge Recommendations: Recommend post-acute placement for additional occupational therapy services prior to discharge home    Subjective    Agreeable to therapy session      Objective       10/13/23 1231   Vitals   O2 Delivery Device Nasal Cannula   Pain 0 - 10 Group   Therapist Pain Assessment During Activity;Nurse Notified  (mild right shoulder pain)   Cognition    Cognition / Consciousness WDL   Level of Consciousness Alert   Active ROM Upper Body   Comments full AROM R hand and wrist, full AROM right elbow, R shoulder NT due to post-op precautions   Sensation Upper Body   Comments reports intact sensation in BUEs.   Sitting  Upper Body Exercises   Comments AROM R elbow, wrist and hand with shoulder immobilized   Balance   Sitting Balance (Static) Fair   Sitting Balance (Dynamic) Fair   Weight Shift Sitting Fair   Skilled Intervention Verbal Cuing;Compensatory Strategies;Facilitation   Bed Mobility    Comments up in chair   Activities of Daily Living   Grooming Supervision;Seated  (assist to open mouth wash, able to open all other containers)   Upper Body Dressing Maximal Assist  (modA gown, MaxA immobilizer)   Toileting   (denied need)   Skilled Intervention Verbal Cuing;Facilitation;Sequencing;Compensatory Strategies   6 Clicks Daily Activity Score 15   Short Term Goals   Short Term Goal # 1 Pt will perform LB dressing w/ supv and AE PRN   Goal Outcome # 1 Goal not met   Short Term Goal # 2 Pt will perform ADL transfer w/ supv   Goal Outcome # 2 Goal not met   Short Term Goal # 3 Pt will perform UB dressing w/ supv   Goal Outcome # 3 Goal not met   Education Group   Role of Occupational Therapist Patient Response Patient;Acceptance;Explanation;Verbal Demonstration   Upper Ext ROM Patient Response Patient;Acceptance;Explanation;Verbal Demonstration;Action Demonstration;Demonstration  (educated on elbow, wrist, hand ROM)   Brace Wear & Care Patient Response Patient;Acceptance;Explanation;Demonstration;Verbal Demonstration;Reinforcement Needed   ADL Patient Response Patient;Acceptance;Explanation;Demonstration;Verbal Demonstration;Reinforcement Needed   Weight Bearing Precautions Patient Response Patient;Acceptance;Explanation;Demonstration;Verbal Demonstration   Occupational Therapy Treatment Plan    O.T. Treatment Plan Continue Current Treatment Plan

## 2023-10-14 LAB
ANION GAP SERPL CALC-SCNC: 4 MMOL/L (ref 7–16)
BASOPHILS # BLD AUTO: 0.4 % (ref 0–1.8)
BASOPHILS # BLD: 0.06 K/UL (ref 0–0.12)
BUN SERPL-MCNC: 15 MG/DL (ref 8–22)
CALCIUM SERPL-MCNC: 9 MG/DL (ref 8.5–10.5)
CHLORIDE SERPL-SCNC: 97 MMOL/L (ref 96–112)
CO2 SERPL-SCNC: 27 MMOL/L (ref 20–33)
CREAT SERPL-MCNC: 0.55 MG/DL (ref 0.5–1.4)
EOSINOPHIL # BLD AUTO: 0.1 K/UL (ref 0–0.51)
EOSINOPHIL NFR BLD: 0.6 % (ref 0–6.9)
ERYTHROCYTE [DISTWIDTH] IN BLOOD BY AUTOMATED COUNT: 42.4 FL (ref 35.9–50)
GFR SERPLBLD CREATININE-BSD FMLA CKD-EPI: 98 ML/MIN/1.73 M 2
GLUCOSE BLD STRIP.AUTO-MCNC: 133 MG/DL (ref 65–99)
GLUCOSE BLD STRIP.AUTO-MCNC: 157 MG/DL (ref 65–99)
GLUCOSE BLD STRIP.AUTO-MCNC: 168 MG/DL (ref 65–99)
GLUCOSE BLD STRIP.AUTO-MCNC: 185 MG/DL (ref 65–99)
GLUCOSE SERPL-MCNC: 195 MG/DL (ref 65–99)
HCT VFR BLD AUTO: 29 % (ref 37–47)
HGB BLD-MCNC: 9.3 G/DL (ref 12–16)
IMM GRANULOCYTES # BLD AUTO: 0.15 K/UL (ref 0–0.11)
IMM GRANULOCYTES NFR BLD AUTO: 0.9 % (ref 0–0.9)
LYMPHOCYTES # BLD AUTO: 2.09 K/UL (ref 1–4.8)
LYMPHOCYTES NFR BLD: 12.3 % (ref 22–41)
MAGNESIUM SERPL-MCNC: 1.8 MG/DL (ref 1.5–2.5)
MCH RBC QN AUTO: 28.9 PG (ref 27–33)
MCHC RBC AUTO-ENTMCNC: 32.1 G/DL (ref 32.2–35.5)
MCV RBC AUTO: 90.1 FL (ref 81.4–97.8)
MONOCYTES # BLD AUTO: 2.3 K/UL (ref 0–0.85)
MONOCYTES NFR BLD AUTO: 13.5 % (ref 0–13.4)
NEUTROPHILS # BLD AUTO: 12.3 K/UL (ref 1.82–7.42)
NEUTROPHILS NFR BLD: 72.3 % (ref 44–72)
NRBC # BLD AUTO: 0 K/UL
NRBC BLD-RTO: 0 /100 WBC (ref 0–0.2)
PHOSPHATE SERPL-MCNC: 2.8 MG/DL (ref 2.5–4.5)
PLATELET # BLD AUTO: 248 K/UL (ref 164–446)
PMV BLD AUTO: 10.2 FL (ref 9–12.9)
POTASSIUM SERPL-SCNC: 5.4 MMOL/L (ref 3.6–5.5)
RBC # BLD AUTO: 3.22 M/UL (ref 4.2–5.4)
SODIUM SERPL-SCNC: 128 MMOL/L (ref 135–145)
WBC # BLD AUTO: 17 K/UL (ref 4.8–10.8)

## 2023-10-14 PROCEDURE — 700102 HCHG RX REV CODE 250 W/ 637 OVERRIDE(OP): Performed by: STUDENT IN AN ORGANIZED HEALTH CARE EDUCATION/TRAINING PROGRAM

## 2023-10-14 PROCEDURE — 700105 HCHG RX REV CODE 258: Performed by: INTERNAL MEDICINE

## 2023-10-14 PROCEDURE — 770001 HCHG ROOM/CARE - MED/SURG/GYN PRIV*

## 2023-10-14 PROCEDURE — 84100 ASSAY OF PHOSPHORUS: CPT

## 2023-10-14 PROCEDURE — A9270 NON-COVERED ITEM OR SERVICE: HCPCS | Performed by: STUDENT IN AN ORGANIZED HEALTH CARE EDUCATION/TRAINING PROGRAM

## 2023-10-14 PROCEDURE — A9270 NON-COVERED ITEM OR SERVICE: HCPCS | Performed by: INTERNAL MEDICINE

## 2023-10-14 PROCEDURE — 85025 COMPLETE CBC W/AUTO DIFF WBC: CPT

## 2023-10-14 PROCEDURE — 36415 COLL VENOUS BLD VENIPUNCTURE: CPT

## 2023-10-14 PROCEDURE — 700102 HCHG RX REV CODE 250 W/ 637 OVERRIDE(OP): Performed by: INTERNAL MEDICINE

## 2023-10-14 PROCEDURE — 82962 GLUCOSE BLOOD TEST: CPT | Mod: 91

## 2023-10-14 PROCEDURE — 83735 ASSAY OF MAGNESIUM: CPT

## 2023-10-14 PROCEDURE — 80048 BASIC METABOLIC PNL TOTAL CA: CPT

## 2023-10-14 PROCEDURE — 99233 SBSQ HOSP IP/OBS HIGH 50: CPT | Performed by: INTERNAL MEDICINE

## 2023-10-14 PROCEDURE — 700111 HCHG RX REV CODE 636 W/ 250 OVERRIDE (IP): Mod: JZ | Performed by: STUDENT IN AN ORGANIZED HEALTH CARE EDUCATION/TRAINING PROGRAM

## 2023-10-14 RX ORDER — SODIUM CHLORIDE 9 MG/ML
INJECTION, SOLUTION INTRAVENOUS CONTINUOUS
Status: ACTIVE | OUTPATIENT
Start: 2023-10-14 | End: 2023-10-14

## 2023-10-14 RX ORDER — LANOLIN ALCOHOL/MO/W.PET/CERES
400 CREAM (GRAM) TOPICAL 2 TIMES DAILY
Status: COMPLETED | OUTPATIENT
Start: 2023-10-14 | End: 2023-10-14

## 2023-10-14 RX ADMIN — POLYETHYLENE GLYCOL 3350 1 PACKET: 17 POWDER, FOR SOLUTION ORAL at 04:46

## 2023-10-14 RX ADMIN — METHOCARBAMOL 500 MG: 500 TABLET ORAL at 09:51

## 2023-10-14 RX ADMIN — INSULIN LISPRO 3 UNITS: 100 INJECTION, SOLUTION INTRAVENOUS; SUBCUTANEOUS at 11:25

## 2023-10-14 RX ADMIN — SODIUM CHLORIDE: 9 INJECTION, SOLUTION INTRAVENOUS at 09:55

## 2023-10-14 RX ADMIN — METHOCARBAMOL 500 MG: 500 TABLET ORAL at 20:21

## 2023-10-14 RX ADMIN — METOPROLOL SUCCINATE 50 MG: 50 TABLET, EXTENDED RELEASE ORAL at 04:46

## 2023-10-14 RX ADMIN — INSULIN LISPRO 3 UNITS: 100 INJECTION, SOLUTION INTRAVENOUS; SUBCUTANEOUS at 16:44

## 2023-10-14 RX ADMIN — METHOCARBAMOL 500 MG: 500 TABLET ORAL at 16:38

## 2023-10-14 RX ADMIN — ENOXAPARIN SODIUM 40 MG: 100 INJECTION SUBCUTANEOUS at 16:39

## 2023-10-14 RX ADMIN — ATORVASTATIN CALCIUM 40 MG: 40 TABLET, FILM COATED ORAL at 16:39

## 2023-10-14 RX ADMIN — INSULIN LISPRO 3 UNITS: 100 INJECTION, SOLUTION INTRAVENOUS; SUBCUTANEOUS at 05:30

## 2023-10-14 RX ADMIN — LISINOPRIL 20 MG: 20 TABLET ORAL at 04:46

## 2023-10-14 RX ADMIN — Medication 400 MG: at 09:51

## 2023-10-14 RX ADMIN — Medication 400 MG: at 16:39

## 2023-10-14 RX ADMIN — OXYCODONE HYDROCHLORIDE 10 MG: 10 TABLET ORAL at 16:38

## 2023-10-14 RX ADMIN — INSULIN GLARGINE-YFGN 14 UNITS: 100 INJECTION, SOLUTION SUBCUTANEOUS at 16:45

## 2023-10-14 RX ADMIN — OXYCODONE HYDROCHLORIDE 10 MG: 10 TABLET ORAL at 04:46

## 2023-10-14 RX ADMIN — DOCUSATE SODIUM 50 MG AND SENNOSIDES 8.6 MG 2 TABLET: 8.6; 5 TABLET, FILM COATED ORAL at 16:39

## 2023-10-14 RX ADMIN — DOCUSATE SODIUM 50 MG AND SENNOSIDES 8.6 MG 2 TABLET: 8.6; 5 TABLET, FILM COATED ORAL at 04:46

## 2023-10-14 RX ADMIN — OXYCODONE HYDROCHLORIDE 10 MG: 10 TABLET ORAL at 01:24

## 2023-10-14 RX ADMIN — METHOCARBAMOL 500 MG: 500 TABLET ORAL at 12:39

## 2023-10-14 ASSESSMENT — PAIN DESCRIPTION - PAIN TYPE
TYPE: ACUTE PAIN

## 2023-10-14 NOTE — CARE PLAN
The patient is Stable - Low risk of patient condition declining or worsening    Shift Goals  Clinical Goals: pain control, moblity, have a BM  Patient Goals: pain control  Family Goals: not present    Progress made toward(s) clinical / shift goals:  Pain controlled with prn medication as needed, slow to mobilize see therapy notes, took laxatives anticipate BM encouraged MOM to take but declined and wants to give miralax time to work.     Patient is not progressing towards the following goals:

## 2023-10-14 NOTE — DISCHARGE PLANNING
Agency/Facility name: [facility]  Spoke to: Paulina  Outcome: Need update to see if Pt is [facility], she has only one bed, sent Teams message to JT GAINES to reach out to Paulina

## 2023-10-14 NOTE — PROGRESS NOTES
"      Orthopaedic Progress Note    Interval changes:  Patient doing well RLE   RUE and RLE dressings CDI  Cleared for DC by ortho pending medicine clearance    ROS - Patient denies any new issues.  Pain well controlled.    /72   Pulse 79   Temp 36.5 °C (97.7 °F) (Temporal)   Resp 16   Ht 1.6 m (5' 3\")   Wt 71 kg (156 lb 8.4 oz)   SpO2 98%     Patient seen and examined  No acute distress  Breathing non labored  RRR  RLE Surgical dressing is clean, dry, and intact. Patient clearly fires tibialis anterior, EHL, and gastrocnemius/soleus. Sensation is intact to light touch throughout superficial peroneal, deep peroneal, tibial, saphenous, and sural nerve distributions. Strong and palpable 2+ dorsalis pedis and posterior tibial pulses with capillary refill less than 2 seconds. No lower leg tenderness or discomfort. RUE in sling, DNVI, cap refill <2 sec.     Recent Labs     10/12/23  0425 10/13/23  0257 10/14/23  0444   WBC 12.8* 16.2* 17.0*   RBC 3.85* 3.28* 3.22*   HEMOGLOBIN 11.1* 9.6* 9.3*   HEMATOCRIT 33.3* 29.4* 29.0*   MCV 86.5 89.6 90.1   MCH 28.8 29.3 28.9   MCHC 33.3 32.7 32.1*   RDW 40.6 42.2 42.4   PLATELETCT 241 239 248   MPV 10.9 10.6 10.2       Active Hospital Problems    Diagnosis     Leucocytosis [D72.829]     Bradycardia [R00.1]     Femoral neck fracture ([facility]) [S72.009A]     ACP (advance care planning) [Z71.89]     Closed right hip fracture, initial encounter ([facility]) [S72.001A]     Hyperlipidemia [E78.5]     Hypertension [I10]     Diabetes ([facility]) [E11.9]        Assessment/Plan:  Patient doing well RLE   RUE and RLE dressings CDI  Cleared for DC by ortho pending medicine clearance  POD#2 S/P Open treatment of right proximal humerus fracture with reverse total shoulder arthroplasty  POD#6 S/P  Open treatment with intramedullary nailing, right intertrochanteric femur fracture.  Wt bearing status - NWB  Wound care/Drains - Dressings to be changed every other day by nursing. Or PRN for saturation " starting POD#2  Future Procedures - none planned   Lovenox: Start 10/8, Duration-until ambulatory > 150'  Sutures/Staples out- 14-21 days post operatively. Removal will completed by ortho mid levels only.  PT/OT-initiated  Antibiotics: Perioperative completed  DVT Prophylaxis- TEDS/SCDs/Foot pumps  Burroughs-not needed per ortho  Case Coordination for Discharge Planning - Disposition per therapy recs.

## 2023-10-14 NOTE — CARE PLAN
The patient is Stable - Low risk of patient condition declining or worsening    Shift Goals  Clinical Goals: Pain control, blood sugar control, mobility  Patient Goals: Pain control, comfort  Family Goals: N/A    Progress made toward(s) clinical / shift goals:    Problem: Knowledge Deficit - Standard  Goal: Patient and family/care givers will demonstrate understanding of plan of care, disease process/condition, diagnostic tests and medications  Outcome: Progressing     Problem: Pain - Standard  Goal: Alleviation of pain or a reduction in pain to the patient’s comfort goal  Outcome: Progressing     Problem: Fall Risk  Goal: Patient will remain free from falls  Outcome: Progressing     Problem: Skin Integrity  Goal: Skin integrity is maintained or improved  Outcome: Progressing

## 2023-10-14 NOTE — PROGRESS NOTES
[facility] Medicine Daily Progress Note    Date of Service  10/14/2023    Chief Complaint  Miley Whittington is a 68 y.o. female admitted 10/7/2023 with ground-level fall  Hospital Course  68 female with past medical history of COPD, hypertension, hyperlipidemia presented after ground-level fall subsequent x-ray noted with comminuted and displaced multipart fracture of the right proximal humerus and nondisplaced intertrochanteric femur fracture.  CT of right shoulder showed comminuted humeral neck fracture with significant impaction and displacement  S/p Open treatment with intramedullary nailing, right   intertrochanteric femur fracture on 10/8.    Status post open treatment of right proximal humerus fracture with reverse total shoulder arthroplasty 10/12    Patient did not appear to have persistent leukocytosis during admission.  She did not endorse any symptoms localizing infection.  Urinalysis was not concerning for infection.  Chest x-ray showed no acute cardiopulmonary disease.  Procalcitonin was negative x3.  Patient did receive perioperative vancomycin and cefazolin per Ortho        Interval Problem Update  Patient was seen and examined at bedside.  I have personally reviewed and interpreted vitals, labs, and imaging.    10/10.  Afebrile.  Stable vitals.  On room air.  Leukocytosis at 11.9.  Urinalysis not suggestive of infection.  Procalcitonin within normal limits x2.  Chest x-ray with no acute cardiopulmonary disease.  Denies fever, chills, chest pains, shortness of breath, cough, congestion.  She does report some constipation.  10/11.  Afebrile.  Hypertensive this morning.  On room air.  Replete mag.  Denies fevers, chills, chest pain, shortness of breath.  Shoulder and hip pain are controlled.  Plan for reverse shoulder replacement tomorrow.  N.p.o. after midnight.  Discussed with orthopedic surgery.  10/12.  Afebrile.  Stable vitals.  On 0-2 L nasal cannula.  Denies fevers, chills, chest pains,  shortness of breath.  Tolerated right shoulder surgery this morning.  Shoulder and hip pain are controlled.  10/13.  Afebrile.  Stable vitals.  On room air.  Leukocytosis now trending up to 16.2.  Procalcitonin still normal.  Per orthopedic surgery patient is on perioperative cefazolin and vancomycin.  Hemoglobin dropped from 11.1 to 9.6.  No obvious bleeding/bruising.  Replete mag.  Denies fever, chills, chest pain, shortness of breath.  Right shoulder pain hurts little more than hip.  10/14.  Afebrile.  Stable vitals.  On 1-2 L nasal cannula.  Sodium 128.  Replete phosphorus, magnesium.  Denies fever, chills, chest pain, shortness of breath.  Continue PT/OT.  Recommending post acute placement.    I have discussed this patient's plan of care and discharge plan at IDT rounds today with Case Management, Nursing, Nursing leadership, and other members of the IDT team.    Consultants/Specialty  orthopedics    Code Status  Full Code    Disposition  The patient is not medically cleared for discharge to home or a post-acute facility.  Anticipate discharge to: skilled nursing facility    I have placed the appropriate orders for post-discharge needs.    Review of Systems  Review of Systems   Musculoskeletal:  Positive for joint pain.        Physical Exam  Temp:  [36.3 °C (97.3 °F)-36.7 °C (98.1 °F)] 36.5 °C (97.7 °F)  Pulse:  [80-91] 85  Resp:  [16-17] 17  BP: (112-152)/(60-82) 152/78  SpO2:  [88 %-98 %] 98 %    Physical Exam  Musculoskeletal:      Comments: Range of motion limited in right hip due to pain         Fluids    Intake/Output Summary (Last 24 hours) at 10/14/2023 0713  Last data filed at 10/13/2023 1920  Gross per 24 hour   Intake 480 ml   Output 700 ml   Net -220 ml         Laboratory  Recent Labs     10/12/23  0425 10/13/23  0257 10/14/23  0444   WBC 12.8* 16.2* 17.0*   RBC 3.85* 3.28* 3.22*   HEMOGLOBIN 11.1* 9.6* 9.3*   HEMATOCRIT 33.3* 29.4* 29.0*   MCV 86.5 89.6 90.1   MCH 28.8 29.3 28.9   MCHC 33.3 32.7  32.1*   RDW 40.6 42.2 42.4   PLATELETCT 241 239 248   MPV 10.9 10.6 10.2       Recent Labs     10/12/23  0425 10/13/23  0257 10/14/23  0444   SODIUM 137 135 128*   POTASSIUM 5.1 4.8 5.4   CHLORIDE 102 102 97   CO2 27 25 27   GLUCOSE 124* 130* 195*   BUN 14 18 15   CREATININE 0.60 0.69 0.55   CALCIUM 9.4 8.8 9.0                     Imaging  DX-SHOULDER 2+ RIGHT   Final Result      Right shoulder arthroplasty without immediate postoperative hardware complication      DX-CHEST-LIMITED (1 VIEW)   Final Result         No acute cardiopulmonary abnormalities are identified.      DX-PORTABLE FLUOROSCOPY < 1 HOUR   Final Result      Portable fluoroscopy utilized for 26 seconds.         INTERPRETING LOCATION: [address] , [place], [address]      DX-HIP-UNILATERAL-W/O PELVIS-2/3 VIEWS RIGHT   Final Result      Digitized intraoperative radiograph is submitted for review. This examination is not for diagnostic purpose but for guidance during a surgical procedure. Please see the patient's chart for full procedural details.         INTERPRETING LOCATION: [address] , [place], [address]      CT-SHOULDER W/O PLUS RECONS RIGHT   Final Result      1.  Comminuted humeral neck fracture is identified with significant impaction and displacement at the fracture site as described above.      2.  No evidence of dislocation at the glenohumeral joint.      3.  No other fracture or malalignment is identified.      [id number] FILM X-RAY   Final Result      [id number] FILM X-RAY   Final Result      OUTSIDE IMAGES-DX CHEST   Final Result             Assessment/Plan  * Femoral neck fracture (HCC)  Assessment & Plan  10/14/2023  S/p Open treatment with intramedullary nailing, right   intertrochanteric femur fracture.  PT/OT eval  Pain management, on IV narcotics, monitor for toxicity    Bradycardia  Assessment & Plan  10/14/2023  Bradycardia resolved  Resume metoprolol, decrease dose to 50 XL    Leucocytosis  Assessment &  Plan  10/14/2023  Likely stress-induced  No concern of ongoing infection  Denies cough, dysuria remained afebrile  Urinalysis not concerning for infection  Chest x-ray unremarkable, procalcitonin negative x2    Perioperative vancomycin and cefazolin per Ortho    ACP (advance care planning)  Assessment & Plan  Full code    Hyperlipidemia  Assessment & Plan  10/14/2023  Continue lipitor    Hypertension  Assessment & Plan  10/14/2023  Continue home medications    Diabetes (HCC)  Assessment & Plan  10/14/2023  Sliding scale         VTE prophylaxis: lovenox    I have performed a physical exam and reviewed and updated ROS and Plan today (10/14/2023). In review of yesterday's note (10/13/2023), there are no changes except as documented above.         Greater than 51 minutes spent prepping to see patient (e.g. review of tests) obtaining and/or reviewing separately obtained history. Performing a medically appropriate examination and/ evaluation.  Counseling and educating the patient/family/caregiver.  Ordering medications, tests, or procedures.  Referring and communicating with other health care professionals.  Documenting clinical information in EPIC.  Independently interpreting results and communicating results to patient/family/caregiver.  Care coordination.

## 2023-10-15 PROBLEM — E87.1 HYPONATREMIA: Status: ACTIVE | Noted: 2023-10-15

## 2023-10-15 LAB
ANION GAP SERPL CALC-SCNC: 7 MMOL/L (ref 7–16)
APPEARANCE UR: CLEAR
BASOPHILS # BLD AUTO: 0.3 % (ref 0–1.8)
BASOPHILS # BLD: 0.04 K/UL (ref 0–0.12)
BILIRUB UR QL STRIP.AUTO: NEGATIVE
BUN SERPL-MCNC: 12 MG/DL (ref 8–22)
CALCIUM SERPL-MCNC: 8.6 MG/DL (ref 8.5–10.5)
CHLORIDE SERPL-SCNC: 93 MMOL/L (ref 96–112)
CO2 SERPL-SCNC: 25 MMOL/L (ref 20–33)
COLOR UR: YELLOW
CREAT SERPL-MCNC: 0.49 MG/DL (ref 0.5–1.4)
CREAT UR-MCNC: 32.3 MG/DL
EOSINOPHIL # BLD AUTO: 0.11 K/UL (ref 0–0.51)
EOSINOPHIL NFR BLD: 0.8 % (ref 0–6.9)
ERYTHROCYTE [DISTWIDTH] IN BLOOD BY AUTOMATED COUNT: 39.5 FL (ref 35.9–50)
GFR SERPLBLD CREATININE-BSD FMLA CKD-EPI: 101 ML/MIN/1.73 M 2
GLUCOSE BLD STRIP.AUTO-MCNC: 126 MG/DL (ref 65–99)
GLUCOSE BLD STRIP.AUTO-MCNC: 144 MG/DL (ref 65–99)
GLUCOSE BLD STRIP.AUTO-MCNC: 164 MG/DL (ref 65–99)
GLUCOSE BLD STRIP.AUTO-MCNC: 214 MG/DL (ref 65–99)
GLUCOSE SERPL-MCNC: 119 MG/DL (ref 65–99)
GLUCOSE UR STRIP.AUTO-MCNC: NEGATIVE MG/DL
HCT VFR BLD AUTO: 28.1 % (ref 37–47)
HGB BLD-MCNC: 9.5 G/DL (ref 12–16)
IMM GRANULOCYTES # BLD AUTO: 0.13 K/UL (ref 0–0.11)
IMM GRANULOCYTES NFR BLD AUTO: 0.9 % (ref 0–0.9)
KETONES UR STRIP.AUTO-MCNC: ABNORMAL MG/DL
LEUKOCYTE ESTERASE UR QL STRIP.AUTO: NEGATIVE
LYMPHOCYTES # BLD AUTO: 1.84 K/UL (ref 1–4.8)
LYMPHOCYTES NFR BLD: 13.1 % (ref 22–41)
MAGNESIUM SERPL-MCNC: 1.8 MG/DL (ref 1.5–2.5)
MCH RBC QN AUTO: 29.1 PG (ref 27–33)
MCHC RBC AUTO-ENTMCNC: 33.8 G/DL (ref 32.2–35.5)
MCV RBC AUTO: 86.2 FL (ref 81.4–97.8)
MICRO URNS: ABNORMAL
MONOCYTES # BLD AUTO: 1.48 K/UL (ref 0–0.85)
MONOCYTES NFR BLD AUTO: 10.5 % (ref 0–13.4)
NEUTROPHILS # BLD AUTO: 10.46 K/UL (ref 1.82–7.42)
NEUTROPHILS NFR BLD: 74.4 % (ref 44–72)
NITRITE UR QL STRIP.AUTO: NEGATIVE
NRBC # BLD AUTO: 0 K/UL
NRBC BLD-RTO: 0 /100 WBC (ref 0–0.2)
OSMOLALITY UR: 341 MOSM/KG H2O (ref 300–900)
PH UR STRIP.AUTO: 7 [PH] (ref 5–8)
PHOSPHATE SERPL-MCNC: 3 MG/DL (ref 2.5–4.5)
PLATELET # BLD AUTO: 292 K/UL (ref 164–446)
PMV BLD AUTO: 9.7 FL (ref 9–12.9)
POTASSIUM SERPL-SCNC: 5.4 MMOL/L (ref 3.6–5.5)
PROT UR QL STRIP: NEGATIVE MG/DL
RBC # BLD AUTO: 3.26 M/UL (ref 4.2–5.4)
RBC UR QL AUTO: NEGATIVE
SODIUM SERPL-SCNC: 125 MMOL/L (ref 135–145)
SODIUM UR-SCNC: 72 MMOL/L
SP GR UR STRIP.AUTO: 1.01
UROBILINOGEN UR STRIP.AUTO-MCNC: 0.2 MG/DL
WBC # BLD AUTO: 14.1 K/UL (ref 4.8–10.8)

## 2023-10-15 PROCEDURE — 85025 COMPLETE CBC W/AUTO DIFF WBC: CPT

## 2023-10-15 PROCEDURE — 80048 BASIC METABOLIC PNL TOTAL CA: CPT

## 2023-10-15 PROCEDURE — 700102 HCHG RX REV CODE 250 W/ 637 OVERRIDE(OP): Performed by: STUDENT IN AN ORGANIZED HEALTH CARE EDUCATION/TRAINING PROGRAM

## 2023-10-15 PROCEDURE — 99233 SBSQ HOSP IP/OBS HIGH 50: CPT | Performed by: INTERNAL MEDICINE

## 2023-10-15 PROCEDURE — 83935 ASSAY OF URINE OSMOLALITY: CPT

## 2023-10-15 PROCEDURE — A9270 NON-COVERED ITEM OR SERVICE: HCPCS | Performed by: STUDENT IN AN ORGANIZED HEALTH CARE EDUCATION/TRAINING PROGRAM

## 2023-10-15 PROCEDURE — 84100 ASSAY OF PHOSPHORUS: CPT

## 2023-10-15 PROCEDURE — 700102 HCHG RX REV CODE 250 W/ 637 OVERRIDE(OP): Performed by: INTERNAL MEDICINE

## 2023-10-15 PROCEDURE — 83735 ASSAY OF MAGNESIUM: CPT

## 2023-10-15 PROCEDURE — 700111 HCHG RX REV CODE 636 W/ 250 OVERRIDE (IP): Mod: JZ | Performed by: STUDENT IN AN ORGANIZED HEALTH CARE EDUCATION/TRAINING PROGRAM

## 2023-10-15 PROCEDURE — 36415 COLL VENOUS BLD VENIPUNCTURE: CPT

## 2023-10-15 PROCEDURE — 82570 ASSAY OF URINE CREATININE: CPT

## 2023-10-15 PROCEDURE — 84300 ASSAY OF URINE SODIUM: CPT

## 2023-10-15 PROCEDURE — 81003 URINALYSIS AUTO W/O SCOPE: CPT

## 2023-10-15 PROCEDURE — 82962 GLUCOSE BLOOD TEST: CPT

## 2023-10-15 PROCEDURE — 770001 HCHG ROOM/CARE - MED/SURG/GYN PRIV*

## 2023-10-15 PROCEDURE — A9270 NON-COVERED ITEM OR SERVICE: HCPCS | Performed by: INTERNAL MEDICINE

## 2023-10-15 RX ORDER — SODIUM CHLORIDE 1 G/1
1 TABLET ORAL 2 TIMES DAILY WITH MEALS
Status: DISCONTINUED | OUTPATIENT
Start: 2023-10-15 | End: 2023-10-16

## 2023-10-15 RX ORDER — LANOLIN ALCOHOL/MO/W.PET/CERES
400 CREAM (GRAM) TOPICAL 2 TIMES DAILY
Status: COMPLETED | OUTPATIENT
Start: 2023-10-15 | End: 2023-10-15

## 2023-10-15 RX ADMIN — ENOXAPARIN SODIUM 40 MG: 100 INJECTION SUBCUTANEOUS at 17:55

## 2023-10-15 RX ADMIN — METHOCARBAMOL 500 MG: 500 TABLET ORAL at 20:41

## 2023-10-15 RX ADMIN — OXYCODONE HYDROCHLORIDE 10 MG: 10 TABLET ORAL at 11:25

## 2023-10-15 RX ADMIN — OXYCODONE HYDROCHLORIDE 10 MG: 10 TABLET ORAL at 18:03

## 2023-10-15 RX ADMIN — Medication 400 MG: at 17:55

## 2023-10-15 RX ADMIN — METHOCARBAMOL 500 MG: 500 TABLET ORAL at 17:55

## 2023-10-15 RX ADMIN — DOCUSATE SODIUM 50 MG AND SENNOSIDES 8.6 MG 2 TABLET: 8.6; 5 TABLET, FILM COATED ORAL at 06:11

## 2023-10-15 RX ADMIN — METHOCARBAMOL 500 MG: 500 TABLET ORAL at 08:55

## 2023-10-15 RX ADMIN — SODIUM CHLORIDE 1 G: 1 TABLET ORAL at 17:57

## 2023-10-15 RX ADMIN — METHOCARBAMOL 500 MG: 500 TABLET ORAL at 13:01

## 2023-10-15 RX ADMIN — INSULIN LISPRO 3 UNITS: 100 INJECTION, SOLUTION INTRAVENOUS; SUBCUTANEOUS at 12:58

## 2023-10-15 RX ADMIN — DOCUSATE SODIUM 50 MG AND SENNOSIDES 8.6 MG 2 TABLET: 8.6; 5 TABLET, FILM COATED ORAL at 17:55

## 2023-10-15 RX ADMIN — INSULIN LISPRO 4 UNITS: 100 INJECTION, SOLUTION INTRAVENOUS; SUBCUTANEOUS at 20:38

## 2023-10-15 RX ADMIN — OXYCODONE HYDROCHLORIDE 10 MG: 10 TABLET ORAL at 06:11

## 2023-10-15 RX ADMIN — LISINOPRIL 20 MG: 20 TABLET ORAL at 06:12

## 2023-10-15 RX ADMIN — METOPROLOL SUCCINATE 50 MG: 50 TABLET, EXTENDED RELEASE ORAL at 06:12

## 2023-10-15 RX ADMIN — Medication 400 MG: at 11:06

## 2023-10-15 RX ADMIN — ATORVASTATIN CALCIUM 40 MG: 40 TABLET, FILM COATED ORAL at 17:55

## 2023-10-15 RX ADMIN — SODIUM CHLORIDE 1 G: 1 TABLET ORAL at 11:06

## 2023-10-15 RX ADMIN — MAGNESIUM HYDROXIDE 30 ML: 1200 LIQUID ORAL at 06:11

## 2023-10-15 RX ADMIN — OXYCODONE HYDROCHLORIDE 10 MG: 10 TABLET ORAL at 01:50

## 2023-10-15 RX ADMIN — INSULIN GLARGINE-YFGN 14 UNITS: 100 INJECTION, SOLUTION SUBCUTANEOUS at 17:49

## 2023-10-15 ASSESSMENT — PAIN DESCRIPTION - PAIN TYPE
TYPE: ACUTE PAIN;SURGICAL PAIN

## 2023-10-15 NOTE — PROGRESS NOTES
[facility] Medicine Daily Progress Note    Date of Service  10/15/2023    Chief Complaint  Miley Whittington is a 68 y.o. female admitted 10/7/2023 with ground-level fall  Hospital Course  68 female with past medical history of COPD, hypertension, hyperlipidemia presented after ground-level fall subsequent x-ray noted with comminuted and displaced multipart fracture of the right proximal humerus and nondisplaced intertrochanteric femur fracture.  CT of right shoulder showed comminuted humeral neck fracture with significant impaction and displacement  S/p Open treatment with intramedullary nailing, right   intertrochanteric femur fracture on 10/8.    Status post open treatment of right proximal humerus fracture with reverse total shoulder arthroplasty 10/12    Patient did not appear to have persistent leukocytosis during admission.  She did not endorse any symptoms localizing infection.  Urinalysis was not concerning for infection.  Chest x-ray showed no acute cardiopulmonary disease.  Procalcitonin was negative x3.  Patient did receive perioperative vancomycin and cefazolin per Ortho        Interval Problem Update  Patient was seen and examined at bedside.  I have personally reviewed and interpreted vitals, labs, and imaging.    10/10.  Afebrile.  Stable vitals.  On room air.  Leukocytosis at 11.9.  Urinalysis not suggestive of infection.  Procalcitonin within normal limits x2.  Chest x-ray with no acute cardiopulmonary disease.  Denies fever, chills, chest pains, shortness of breath, cough, congestion.  She does report some constipation.  10/11.  Afebrile.  Hypertensive this morning.  On room air.  Replete mag.  Denies fevers, chills, chest pain, shortness of breath.  Shoulder and hip pain are controlled.  Plan for reverse shoulder replacement tomorrow.  N.p.o. after midnight.  Discussed with orthopedic surgery.  10/12.  Afebrile.  Stable vitals.  On 0-2 L nasal cannula.  Denies fevers, chills, chest pains,  shortness of breath.  Tolerated right shoulder surgery this morning.  Shoulder and hip pain are controlled.  10/13.  Afebrile.  Stable vitals.  On room air.  Leukocytosis now trending up to 16.2.  Procalcitonin still normal.  Per orthopedic surgery patient is on perioperative cefazolin and vancomycin.  Hemoglobin dropped from 11.1 to 9.6.  No obvious bleeding/bruising.  Replete mag.  Denies fever, chills, chest pain, shortness of breath.  Right shoulder pain hurts little more than hip.  10/14.  Afebrile.  Stable vitals.  On 1-2 L nasal cannula.  Sodium 128.  Replete phosphorus, magnesium.  Denies fever, chills, chest pain, shortness of breath.  Continue PT/OT.  Recommending post acute placement.  10/15.  Afebrile.  Stable vitals.  On 0-2L NC.  Denies fever, chills, chest pains, shortness of breath, dysuria.  Noted drop in sodium to 125.  Patient appears euvolemic on exam.  Ordered urine studies, serum osmolarity.  Started on salt tabs.    I have discussed this patient's plan of care and discharge plan at IDT rounds today with Case Management, Nursing, Nursing leadership, and other members of the IDT team.    Consultants/Specialty  orthopedics    Code Status  Full Code    Disposition  The patient is not medically cleared for discharge to home or a post-acute facility.  Anticipate discharge to: skilled nursing facility    I have placed the appropriate orders for post-discharge needs.    Review of Systems  Review of Systems   Musculoskeletal:  Positive for joint pain.        Physical Exam  Temp:  [36.3 °C (97.3 °F)-36.5 °C (97.7 °F)] 36.4 °C (97.5 °F)  Pulse:  [78-84] 84  Resp:  [16-17] 17  BP: (122-145)/(72-82) 145/78  SpO2:  [93 %-98 %] 94 %    Physical Exam  Musculoskeletal:      Comments: Range of motion limited in right hip due to pain         Fluids    Intake/Output Summary (Last 24 hours) at 10/15/2023 0609  Last data filed at 10/14/2023 1937  Gross per 24 hour   Intake 1203.62 ml   Output 700 ml   Net 503.62 ml          Laboratory  Recent Labs     10/13/23  0257 10/14/23  0444   WBC 16.2* 17.0*   RBC 3.28* 3.22*   HEMOGLOBIN 9.6* 9.3*   HEMATOCRIT 29.4* 29.0*   MCV 89.6 90.1   MCH 29.3 28.9   MCHC 32.7 32.1*   RDW 42.2 42.4   PLATELETCT 239 248   MPV 10.6 10.2       Recent Labs     10/13/23  0257 10/14/23  0444   SODIUM 135 128*   POTASSIUM 4.8 5.4   CHLORIDE 102 97   CO2 25 27   GLUCOSE 130* 195*   BUN 18 15   CREATININE 0.69 0.55   CALCIUM 8.8 9.0                     Imaging  DX-SHOULDER 2+ RIGHT   Final Result      Right shoulder arthroplasty without immediate postoperative hardware complication      DX-CHEST-LIMITED (1 VIEW)   Final Result         No acute cardiopulmonary abnormalities are identified.      DX-PORTABLE FLUOROSCOPY < 1 HOUR   Final Result      Portable fluoroscopy utilized for 26 seconds.         INTERPRETING LOCATION: [address] , [place], [address]      DX-HIP-UNILATERAL-W/O PELVIS-2/3 VIEWS RIGHT   Final Result      Digitized intraoperative radiograph is submitted for review. This examination is not for diagnostic purpose but for guidance during a surgical procedure. Please see the patient's chart for full procedural details.         INTERPRETING LOCATION: [address] , [place], [address]      CT-SHOULDER W/O PLUS RECONS RIGHT   Final Result      1.  Comminuted humeral neck fracture is identified with significant impaction and displacement at the fracture site as described above.      2.  No evidence of dislocation at the glenohumeral joint.      3.  No other fracture or malalignment is identified.      [id number] FILM X-RAY   Final Result      [id number] FILM X-RAY   Final Result      OUTSIDE IMAGES-DX CHEST   Final Result             Assessment/Plan  * Femoral neck fracture (HCC)  Assessment & Plan  10/15/2023  S/p Open treatment with intramedullary nailing, right   intertrochanteric femur fracture.  PT/OT eval  Pain management, on IV narcotics, monitor for  toxicity    Hyponatremia  Assessment & Plan  10/15/2023  Noted drop in sodium to 125.  Patient appears euvolemic on exam.  Ordered urine studies, serum osmolarity.  Started on salt tabs.    Bradycardia  Assessment & Plan  10/15/2023  Bradycardia resolved  Resume metoprolol, decrease dose to 50 XL    Leucocytosis  Assessment & Plan  10/15/2023  Likely stress-induced  No concern of ongoing infection  Denies cough, dysuria remained afebrile  Urinalysis not concerning for infection  Chest x-ray unremarkable, procalcitonin negative x2    Perioperative vancomycin and cefazolin per Ortho    ACP (advance care planning)  Assessment & Plan  Full code    Hyperlipidemia  Assessment & Plan  10/15/2023  Continue lipitor    Hypertension  Assessment & Plan  10/15/2023  Continue home medications    Diabetes (HCC)  Assessment & Plan  10/15/2023  Sliding scale         VTE prophylaxis: lovenox    I have performed a physical exam and reviewed and updated ROS and Plan today (10/15/2023). In review of yesterday's note (10/14/2023), there are no changes except as documented above.         Greater than 51 minutes spent prepping to see patient (e.g. review of tests) obtaining and/or reviewing separately obtained history. Performing a medically appropriate examination and/ evaluation.  Counseling and educating the patient/family/caregiver.  Ordering medications, tests, or procedures.  Referring and communicating with other health care professionals.  Documenting clinical information in EPIC.  Independently interpreting results and communicating results to patient/family/caregiver.  Care coordination.

## 2023-10-15 NOTE — ASSESSMENT & PLAN NOTE
"10/16/2023  Noted drop in sodium to 125.  Patient appears euvolemic on exam.  Ordered urine studies, serum osmolarity.    Increase salt tabs  fluid restriction\"    Free water avoidance  OK to salt food.  "

## 2023-10-15 NOTE — CARE PLAN
The patient is Stable - Low risk of patient condition declining or worsening    Shift Goals  Clinical Goals: pain control, mobility  Patient Goals: rest  Family Goals: none present    Progress made toward(s) clinical / shift goals:    Problem: Pain - Standard  Goal: Alleviation of pain or a reduction in pain to the patient’s comfort goal  Outcome: Progressing     Problem: Fall Risk  Goal: Patient will remain free from falls  Outcome: Progressing     Problem: Skin Integrity  Goal: Skin integrity is maintained or improved  Outcome: Progressing       Patient is not progressing towards the following goals:

## 2023-10-15 NOTE — CARE PLAN
The patient is Stable - Low risk of patient condition declining or worsening    Shift Goals  Clinical Goals: Pain control, Mobility  Patient Goals: Pain control,  Family Goals: Not present    Progress made toward(s) clinical / shift goals:    Problem: Knowledge Deficit - Standard  Goal: Patient and family/care givers will demonstrate understanding of plan of care, disease process/condition, diagnostic tests and medications  Outcome: Progressing  Note: Plan of care discussed, verbalized understanding. Questions answered        Problem: Pain - Standard  Goal: Alleviation of pain or a reduction in pain to the patient’s comfort goal  Outcome: Progressing  Note: Patient educated on pain scale and to notify RN of pain. Medicated per MAR and repositioned        Problem: Fall Risk  Goal: Patient will remain free from falls  Outcome: Progressing     Problem: Skin Integrity  Goal: Skin integrity is maintained or improved  Outcome: Progressing       Patient is not progressing towards the following goals:

## 2023-10-16 LAB
ANION GAP SERPL CALC-SCNC: 6 MMOL/L (ref 7–16)
BASOPHILS # BLD AUTO: 0.2 % (ref 0–1.8)
BASOPHILS # BLD: 0.03 K/UL (ref 0–0.12)
BUN SERPL-MCNC: 12 MG/DL (ref 8–22)
CALCIUM SERPL-MCNC: 8.4 MG/DL (ref 8.5–10.5)
CHLORIDE SERPL-SCNC: 92 MMOL/L (ref 96–112)
CO2 SERPL-SCNC: 27 MMOL/L (ref 20–33)
CREAT SERPL-MCNC: 0.45 MG/DL (ref 0.5–1.4)
EOSINOPHIL # BLD AUTO: 0.15 K/UL (ref 0–0.51)
EOSINOPHIL NFR BLD: 1.1 % (ref 0–6.9)
ERYTHROCYTE [DISTWIDTH] IN BLOOD BY AUTOMATED COUNT: 38.8 FL (ref 35.9–50)
GFR SERPLBLD CREATININE-BSD FMLA CKD-EPI: 103 ML/MIN/1.73 M 2
GLUCOSE BLD STRIP.AUTO-MCNC: 141 MG/DL (ref 65–99)
GLUCOSE BLD STRIP.AUTO-MCNC: 167 MG/DL (ref 65–99)
GLUCOSE BLD STRIP.AUTO-MCNC: 194 MG/DL (ref 65–99)
GLUCOSE BLD STRIP.AUTO-MCNC: 241 MG/DL (ref 65–99)
GLUCOSE SERPL-MCNC: 142 MG/DL (ref 65–99)
HCT VFR BLD AUTO: 29.1 % (ref 37–47)
HGB BLD-MCNC: 10 G/DL (ref 12–16)
IMM GRANULOCYTES # BLD AUTO: 0.13 K/UL (ref 0–0.11)
IMM GRANULOCYTES NFR BLD AUTO: 1 % (ref 0–0.9)
LYMPHOCYTES # BLD AUTO: 1.74 K/UL (ref 1–4.8)
LYMPHOCYTES NFR BLD: 13 % (ref 22–41)
MAGNESIUM SERPL-MCNC: 2 MG/DL (ref 1.5–2.5)
MCH RBC QN AUTO: 28.8 PG (ref 27–33)
MCHC RBC AUTO-ENTMCNC: 34.4 G/DL (ref 32.2–35.5)
MCV RBC AUTO: 83.9 FL (ref 81.4–97.8)
MONOCYTES # BLD AUTO: 1.29 K/UL (ref 0–0.85)
MONOCYTES NFR BLD AUTO: 9.6 % (ref 0–13.4)
NEUTROPHILS # BLD AUTO: 10.06 K/UL (ref 1.82–7.42)
NEUTROPHILS NFR BLD: 75.1 % (ref 44–72)
NRBC # BLD AUTO: 0 K/UL
NRBC BLD-RTO: 0 /100 WBC (ref 0–0.2)
OSMOLALITY SERPL: 269 MOSM/KG H2O (ref 278–298)
PHOSPHATE SERPL-MCNC: 3.2 MG/DL (ref 2.5–4.5)
PLATELET # BLD AUTO: 373 K/UL (ref 164–446)
PMV BLD AUTO: 9.6 FL (ref 9–12.9)
POTASSIUM SERPL-SCNC: 4.5 MMOL/L (ref 3.6–5.5)
RBC # BLD AUTO: 3.47 M/UL (ref 4.2–5.4)
SODIUM SERPL-SCNC: 125 MMOL/L (ref 135–145)
WBC # BLD AUTO: 13.4 K/UL (ref 4.8–10.8)

## 2023-10-16 PROCEDURE — 700102 HCHG RX REV CODE 250 W/ 637 OVERRIDE(OP): Performed by: STUDENT IN AN ORGANIZED HEALTH CARE EDUCATION/TRAINING PROGRAM

## 2023-10-16 PROCEDURE — 85025 COMPLETE CBC W/AUTO DIFF WBC: CPT

## 2023-10-16 PROCEDURE — A9270 NON-COVERED ITEM OR SERVICE: HCPCS | Performed by: STUDENT IN AN ORGANIZED HEALTH CARE EDUCATION/TRAINING PROGRAM

## 2023-10-16 PROCEDURE — A9270 NON-COVERED ITEM OR SERVICE: HCPCS | Performed by: INTERNAL MEDICINE

## 2023-10-16 PROCEDURE — 80048 BASIC METABOLIC PNL TOTAL CA: CPT

## 2023-10-16 PROCEDURE — 83930 ASSAY OF BLOOD OSMOLALITY: CPT

## 2023-10-16 PROCEDURE — 99233 SBSQ HOSP IP/OBS HIGH 50: CPT | Performed by: INTERNAL MEDICINE

## 2023-10-16 PROCEDURE — 700105 HCHG RX REV CODE 258: Performed by: NURSE PRACTITIONER

## 2023-10-16 PROCEDURE — 84100 ASSAY OF PHOSPHORUS: CPT

## 2023-10-16 PROCEDURE — 770001 HCHG ROOM/CARE - MED/SURG/GYN PRIV*

## 2023-10-16 PROCEDURE — 700102 HCHG RX REV CODE 250 W/ 637 OVERRIDE(OP): Performed by: INTERNAL MEDICINE

## 2023-10-16 PROCEDURE — 36415 COLL VENOUS BLD VENIPUNCTURE: CPT

## 2023-10-16 PROCEDURE — 83735 ASSAY OF MAGNESIUM: CPT

## 2023-10-16 PROCEDURE — 82962 GLUCOSE BLOOD TEST: CPT

## 2023-10-16 PROCEDURE — 700111 HCHG RX REV CODE 636 W/ 250 OVERRIDE (IP): Mod: JZ | Performed by: STUDENT IN AN ORGANIZED HEALTH CARE EDUCATION/TRAINING PROGRAM

## 2023-10-16 RX ORDER — SODIUM CHLORIDE 1 G/1
1 TABLET ORAL
Status: DISCONTINUED | OUTPATIENT
Start: 2023-10-16 | End: 2023-10-18 | Stop reason: HOSPADM

## 2023-10-16 RX ORDER — BISACODYL 10 MG
10 SUPPOSITORY, RECTAL RECTAL
Status: DISCONTINUED | OUTPATIENT
Start: 2023-10-16 | End: 2023-10-18 | Stop reason: HOSPADM

## 2023-10-16 RX ORDER — SODIUM CHLORIDE 9 MG/ML
500 INJECTION, SOLUTION INTRAVENOUS ONCE
Status: COMPLETED | OUTPATIENT
Start: 2023-10-16 | End: 2023-10-16

## 2023-10-16 RX ORDER — POLYETHYLENE GLYCOL 3350 17 G/17G
1 POWDER, FOR SOLUTION ORAL DAILY
Status: DISCONTINUED | OUTPATIENT
Start: 2023-10-17 | End: 2023-10-18 | Stop reason: HOSPADM

## 2023-10-16 RX ORDER — AMOXICILLIN 250 MG
2 CAPSULE ORAL 2 TIMES DAILY
Status: DISCONTINUED | OUTPATIENT
Start: 2023-10-17 | End: 2023-10-18 | Stop reason: HOSPADM

## 2023-10-16 RX ADMIN — POLYETHYLENE GLYCOL 3350 1 PACKET: 17 POWDER, FOR SOLUTION ORAL at 05:51

## 2023-10-16 RX ADMIN — DOCUSATE SODIUM 50 MG AND SENNOSIDES 8.6 MG 2 TABLET: 8.6; 5 TABLET, FILM COATED ORAL at 17:09

## 2023-10-16 RX ADMIN — OXYCODONE HYDROCHLORIDE 10 MG: 10 TABLET ORAL at 08:10

## 2023-10-16 RX ADMIN — METHOCARBAMOL 500 MG: 500 TABLET ORAL at 17:10

## 2023-10-16 RX ADMIN — DOCUSATE SODIUM 50 MG AND SENNOSIDES 8.6 MG 2 TABLET: 8.6; 5 TABLET, FILM COATED ORAL at 05:51

## 2023-10-16 RX ADMIN — METHOCARBAMOL 500 MG: 500 TABLET ORAL at 08:10

## 2023-10-16 RX ADMIN — ATORVASTATIN CALCIUM 40 MG: 40 TABLET, FILM COATED ORAL at 17:10

## 2023-10-16 RX ADMIN — OXYCODONE HYDROCHLORIDE 10 MG: 10 TABLET ORAL at 17:10

## 2023-10-16 RX ADMIN — SODIUM CHLORIDE 1 G: 1 TABLET ORAL at 12:05

## 2023-10-16 RX ADMIN — INSULIN LISPRO 3 UNITS: 100 INJECTION, SOLUTION INTRAVENOUS; SUBCUTANEOUS at 12:09

## 2023-10-16 RX ADMIN — MAGNESIUM HYDROXIDE 30 ML: 1200 LIQUID ORAL at 05:50

## 2023-10-16 RX ADMIN — METHOCARBAMOL 500 MG: 500 TABLET ORAL at 12:05

## 2023-10-16 RX ADMIN — SODIUM CHLORIDE 1 G: 1 TABLET ORAL at 08:10

## 2023-10-16 RX ADMIN — METHOCARBAMOL 500 MG: 500 TABLET ORAL at 20:56

## 2023-10-16 RX ADMIN — METOPROLOL SUCCINATE 50 MG: 50 TABLET, EXTENDED RELEASE ORAL at 05:50

## 2023-10-16 RX ADMIN — SODIUM CHLORIDE 1 G: 1 TABLET ORAL at 17:10

## 2023-10-16 RX ADMIN — INSULIN LISPRO 3 UNITS: 100 INJECTION, SOLUTION INTRAVENOUS; SUBCUTANEOUS at 17:11

## 2023-10-16 RX ADMIN — ENOXAPARIN SODIUM 40 MG: 100 INJECTION SUBCUTANEOUS at 17:10

## 2023-10-16 RX ADMIN — OXYCODONE HYDROCHLORIDE 10 MG: 10 TABLET ORAL at 01:05

## 2023-10-16 RX ADMIN — SODIUM CHLORIDE 500 ML: 9 INJECTION, SOLUTION INTRAVENOUS at 22:07

## 2023-10-16 RX ADMIN — INSULIN GLARGINE-YFGN 14 UNITS: 100 INJECTION, SOLUTION SUBCUTANEOUS at 17:11

## 2023-10-16 RX ADMIN — INSULIN LISPRO 4 UNITS: 100 INJECTION, SOLUTION INTRAVENOUS; SUBCUTANEOUS at 20:51

## 2023-10-16 ASSESSMENT — PAIN DESCRIPTION - PAIN TYPE
TYPE: ACUTE PAIN
TYPE: ACUTE PAIN;SURGICAL PAIN
TYPE: ACUTE PAIN

## 2023-10-16 NOTE — CARE PLAN
The patient is Stable - Low risk of patient condition declining or worsening    Shift Goals  Clinical Goals: pain control, mobility  Patient Goals: rest  Family Goals: none present    Progress made toward(s) clinical / shift goals:    Problem: Knowledge Deficit - Standard  Goal: Patient and family/care givers will demonstrate understanding of plan of care, disease process/condition, diagnostic tests and medications  Outcome: Progressing   Update whiteboard, answered all questions asked  Problem: Fall Risk  Goal: Patient will remain free from falls  Outcome: Progressing   Bed lowest position, call light within reach at all times    Patient is not progressing towards the following goals:

## 2023-10-16 NOTE — DISCHARGE PLANNING
Agency/Facility Name: [facility]  Spoke To: Paulina  Outcome: Currently no bed available for tomorrow, however that could change. Will contact this DPA tomorrow morning.

## 2023-10-16 NOTE — PROGRESS NOTES
[facility] Medicine Daily Progress Note    Date of Service  10/16/2023    Chief Complaint  Miley Whittington is a 68 y.o. female admitted 10/7/2023 with ground-level fall  Hospital Course  68 female with past medical history of COPD, hypertension, hyperlipidemia presented after ground-level fall subsequent x-ray noted with comminuted and displaced multipart fracture of the right proximal humerus and nondisplaced intertrochanteric femur fracture.  CT of right shoulder showed comminuted humeral neck fracture with significant impaction and displacement  S/p Open treatment with intramedullary nailing, right   intertrochanteric femur fracture on 10/8.    Status post open treatment of right proximal humerus fracture with reverse total shoulder arthroplasty 10/12    Patient did not appear to have persistent leukocytosis during admission.  She did not endorse any symptoms localizing infection.  Urinalysis was not concerning for infection.  Chest x-ray showed no acute cardiopulmonary disease.  Procalcitonin was negative x3.  Patient did receive perioperative vancomycin and cefazolin per Ortho        Interval Problem Update  Patient was seen and examined at bedside.  I have personally reviewed and interpreted vitals, labs, and imaging.    10/10.  Afebrile.  Stable vitals.  On room air.  Leukocytosis at 11.9.  Urinalysis not suggestive of infection.  Procalcitonin within normal limits x2.  Chest x-ray with no acute cardiopulmonary disease.  Denies fever, chills, chest pains, shortness of breath, cough, congestion.  She does report some constipation.  10/11.  Afebrile.  Hypertensive this morning.  On room air.  Replete mag.  Denies fevers, chills, chest pain, shortness of breath.  Shoulder and hip pain are controlled.  Plan for reverse shoulder replacement tomorrow.  N.p.o. after midnight.  Discussed with orthopedic surgery.  10/12.  Afebrile.  Stable vitals.  On 0-2 L nasal cannula.  Denies fevers, chills, chest pains,  shortness of breath.  Tolerated right shoulder surgery this morning.  Shoulder and hip pain are controlled.  10/13.  Afebrile.  Stable vitals.  On room air.  Leukocytosis now trending up to 16.2.  Procalcitonin still normal.  Per orthopedic surgery patient is on perioperative cefazolin and vancomycin.  Hemoglobin dropped from 11.1 to 9.6.  No obvious bleeding/bruising.  Replete mag.  Denies fever, chills, chest pain, shortness of breath.  Right shoulder pain hurts little more than hip.  10/14.  Afebrile.  Stable vitals.  On 1-2 L nasal cannula.  Sodium 128.  Replete phosphorus, magnesium.  Denies fever, chills, chest pain, shortness of breath.  Continue PT/OT.  Recommending post acute placement.  10/15.  Afebrile.  Stable vitals.  On 0-2L NC.  Denies fever, chills, chest pains, shortness of breath, dysuria.  Noted drop in sodium to 125.  Patient appears euvolemic on exam.  Ordered urine studies, serum osmolarity.  Started on salt tabs.  10/16.  Afebrile.  Mild hypertension.  On room air.  Denies fever, chills, chest pains, shortness of breath.  Shoulder and hip pain are controlled.  Pending placement.  Trend to correct sodium of 125.  Increase salt tabs.  Fluid restriction.    I have discussed this patient's plan of care and discharge plan at IDT rounds today with Case Management, Nursing, Nursing leadership, and other members of the IDT team.    Consultants/Specialty  orthopedics    Code Status  Full Code    Disposition  The patient is not medically cleared for discharge to home or a post-acute facility.  Anticipate discharge to: skilled nursing facility    I have placed the appropriate orders for post-discharge needs.    Review of Systems  Review of Systems   Musculoskeletal:  Positive for joint pain.        Physical Exam  Temp:  [35.8 °C (96.4 °F)-36.7 °C (98.1 °F)] 35.8 °C (96.4 °F)  Pulse:  [] 78  Resp:  [16-18] 18  BP: (128-156)/(70-88) 141/72  SpO2:  [91 %-98 %] 91 %    Physical Exam  Musculoskeletal:       Comments: Range of motion limited in right hip due to pain         Fluids    Intake/Output Summary (Last 24 hours) at 10/16/2023 0558  Last data filed at 10/16/2023 0444  Gross per 24 hour   Intake 480 ml   Output 500 ml   Net -20 ml         Laboratory  Recent Labs     10/14/23  0444 10/15/23  0706   WBC 17.0* 14.1*   RBC 3.22* 3.26*   HEMOGLOBIN 9.3* 9.5*   HEMATOCRIT 29.0* 28.1*   MCV 90.1 86.2   MCH 28.9 29.1   MCHC 32.1* 33.8   RDW 42.4 39.5   PLATELETCT 248 292   MPV 10.2 9.7       Recent Labs     10/14/23  0444 10/15/23  0706   SODIUM 128* 125*   POTASSIUM 5.4 5.4   CHLORIDE 97 93*   CO2 27 25   GLUCOSE 195* 119*   BUN 15 12   CREATININE 0.55 0.49*   CALCIUM 9.0 8.6                     Imaging  DX-SHOULDER 2+ RIGHT   Final Result      Right shoulder arthroplasty without immediate postoperative hardware complication      DX-CHEST-LIMITED (1 VIEW)   Final Result         No acute cardiopulmonary abnormalities are identified.      DX-PORTABLE FLUOROSCOPY < 1 HOUR   Final Result      Portable fluoroscopy utilized for 26 seconds.         INTERPRETING LOCATION: [address] [place] [address], [id number]      DX-HIP-UNILATERAL-W/O PELVIS-2/3 VIEWS RIGHT   Final Result      Digitized intraoperative radiograph is submitted for review. This examination is not for diagnostic purpose but for guidance during a surgical procedure. Please see the patient's chart for full procedural details.         INTERPRETING LOCATION: [address] [facility], [address]      CT-SHOULDER W/O PLUS RECONS RIGHT   Final Result      1.  Comminuted humeral neck fracture is identified with significant impaction and displacement at the fracture site as described above.      2.  No evidence of dislocation at the glenohumeral joint.      3.  No other fracture or malalignment is identified.      [id number] FILM X-RAY   Final Result      [id number] FILM X-RAY   Final Result      OUTSIDE IMAGES-DX CHEST   Final Result             Assessment/Plan  *  Femoral neck fracture (HCC)  Assessment & Plan  10/16/2023  S/p Open treatment with intramedullary nailing, right   intertrochanteric femur fracture.  PT/OT eval  Pain management, on IV narcotics, monitor for toxicity    Hyponatremia  Assessment & Plan  10/16/2023  Noted drop in sodium to 125.  Patient appears euvolemic on exam.  Ordered urine studies, serum osmolarity.    Increase salt tabs  fluid restriction    Bradycardia  Assessment & Plan  10/16/2023  Bradycardia resolved  Resume metoprolol, decrease dose to 50 XL    Leucocytosis  Assessment & Plan  10/16/2023  Likely stress-induced  No concern of ongoing infection  Denies cough, dysuria remained afebrile  Urinalysis not concerning for infection  Chest x-ray unremarkable, procalcitonin negative x2    Perioperative vancomycin and cefazolin per Ortho    ACP (advance care planning)  Assessment & Plan  Full code    Hyperlipidemia  Assessment & Plan  10/16/2023  Continue lipitor    Hypertension  Assessment & Plan  10/16/2023  Continue home medications    Diabetes (HCC)  Assessment & Plan  10/16/2023  Sliding scale         VTE prophylaxis: lovenox    I have performed a physical exam and reviewed and updated ROS and Plan today (10/16/2023). In review of yesterday's note (10/15/2023), there are no changes except as documented above.         Greater than 51 minutes spent prepping to see patient (e.g. review of tests) obtaining and/or reviewing separately obtained history. Performing a medically appropriate examination and/ evaluation.  Counseling and educating the patient/family/caregiver.  Ordering medications, tests, or procedures.  Referring and communicating with other health care professionals.  Documenting clinical information in EPIC.  Independently interpreting results and communicating results to patient/family/caregiver.  Care coordination.

## 2023-10-16 NOTE — CARE PLAN
The patient is Stable - Low risk of patient condition declining or worsening    Shift Goals  Clinical Goals: pain control, mobility, have a bm  Patient Goals: pain control  Family Goals: not present    Progress made toward(s) clinical / shift goals: Pain controlled with oral medication, slow to mobilize limited by pain,  received miralax and mom will encourage suppository.       Patient is not progressing towards the following goals:

## 2023-10-16 NOTE — PROGRESS NOTES
4 Eyes Skin Assessment Completed by Evangelista RN and Luna RN.     Head WDL  Ears WDL  Nose WDL  Mouth WDL  Neck WDL  Breast/Chest dark mole on the left breast  Shoulder Blades Right incision with dressing thick island and sling immobilizer to right arm with associated bruising.   Spine WDL  (R) Arm/Elbow/Hand Bruising.   (L) Arm/Elbow/Hand WDL  Abdomen WDL  Groin WDL, purwick  Scrotum/Coccyx/Buttocks WDL  (R) Leg Incision on the right hip; Discoloration on the right shin area  (L) Leg Discoloration on the left shin area  (R) Heel/Foot/Toe WDL  (L) Heel/Foot/Toe WDL     Devices In Places Pulse Ox, SCD's, sling immobilizer.     Interventions In Place NC W/Ear Foams and Low Air Loss Mattress, pillow support.      Possible Skin Injury No     Pictures Uploaded Into Epic N/A  Wound Consult Placed N/A  RN Wound Prevention Protocol Ordered No

## 2023-10-16 NOTE — DISCHARGE PLANNING
Case Management Discharge Planning    Admission Date: 10/7/2023  GMLOS: 4.5  ALOS: 9    6-Clicks ADL Score: 15  6-Clicks Mobility Score: 10  PT and/or OT Eval ordered: Yes  Post-acute Referrals Ordered: Yes  Post-acute Choice Obtained: Yes  Has referral(s) been sent to post-acute provider:  Yes      Anticipated Discharge Dispo: Discharge Disposition: D/T to SNF with Medicare cert in anticipation of skilled care (03)    DME Needed: No    Action(s) Taken: Updated Provider/Nurse on Discharge Plan    Patient discussed in IDT rounds. Patient is not medically cleared to discharge at this time due to low sodium levels. Patient may discharge tomorrow to SNF. Patient prefers to dc to [facility]. LSW left voicemail for Paulina/admissions director with [facility] inquiring about bed availability. Awaiting returned phone call.    Plan of care ongoing.    Escalations Completed: None    Medically Clear: No    Next Steps: Pending medical clearance and SNF acceptance/ bed availability.    Barriers to Discharge: Medical clearance and Pending Placement    Is the patient up for discharge tomorrow: Potentially.

## 2023-10-16 NOTE — PROGRESS NOTES
"      Orthopaedic Progress Note    Interval changes:  Patient doing well RLE   RUE and RLE dressings CDI  Cleared for DC by ortho pending medicine clearance    ROS - Patient denies any new issues.  Pain well controlled.    BP (!) 154/78   Pulse 80   Temp 36.5 °C (97.7 °F) (Temporal)   Resp 17   Ht 1.6 m (5' 3\")   Wt 71 kg (156 lb 8.4 oz)   SpO2 95%     Patient seen and examined  No acute distress  Breathing non labored  RRR  RLE Surgical dressing is clean, dry, and intact. Patient clearly fires tibialis anterior, EHL, and gastrocnemius/soleus. Sensation is intact to light touch throughout superficial peroneal, deep peroneal, tibial, saphenous, and sural nerve distributions. Strong and palpable 2+ dorsalis pedis and posterior tibial pulses with capillary refill less than 2 seconds. No lower leg tenderness or discomfort. RUE in sling, DNVI, cap refill <2 sec.     Recent Labs     10/13/23  0257 10/14/23  0444 10/15/23  0706   WBC 16.2* 17.0* 14.1*   RBC 3.28* 3.22* 3.26*   HEMOGLOBIN 9.6* 9.3* 9.5*   HEMATOCRIT 29.4* 29.0* 28.1*   MCV 89.6 90.1 86.2   MCH 29.3 28.9 29.1   MCHC 32.7 32.1* 33.8   RDW 42.2 42.4 39.5   PLATELETCT 239 248 292   MPV 10.6 10.2 9.7       Active Hospital Problems    Diagnosis     Hyponatremia [E87.1]     Leucocytosis [D72.829]     Bradycardia [R00.1]     Femoral neck fracture ([facility]) [S72.009A]     ACP (advance care planning) [Z71.89]     Closed right hip fracture, initial encounter ([facility]) [S72.001A]     Hyperlipidemia [E78.5]     Hypertension [I10]     Diabetes ([facility]) [E11.9]        Assessment/Plan:  Patient doing well RLE   RUE and RLE dressings CDI  Cleared for DC by ortho pending medicine clearance  POD#3 S/P Open treatment of right proximal humerus fracture with reverse total shoulder arthroplasty  POD#7 S/P  Open treatment with intramedullary nailing, right intertrochanteric femur fracture.  Wt bearing status - NWB  Wound care/Drains - Dressings to be changed every other day by nursing. " Or PRN for saturation starting POD#2  Future Procedures - none planned   Lovenox: Start 10/8, Duration-until ambulatory > 150'  Sutures/Staples out- 14-21 days post operatively. Removal will completed by ortho mid levels only.  PT/OT-initiated  Antibiotics: Perioperative completed  DVT Prophylaxis- TEDS/SCDs/Foot pumps  Burroughs-not needed per ortho  Case Coordination for Discharge Planning - Disposition per therapy recs.

## 2023-10-16 NOTE — PROGRESS NOTES
"      Orthopaedic Progress Note    Interval changes:  Patient doing well RLE   RUE and RLE dressings CDI  RLE dressings changed, incisions without issues  Cleared for DC by ortho pending medicine clearance    ROS - Patient denies any new issues.  Pain well controlled.    BP (!) 165/83   Pulse 81   Temp 36.4 °C (97.5 °F) (Temporal)   Resp 17   Ht 1.6 m (5' 3\")   Wt 71 kg (156 lb 8.4 oz)   SpO2 92%     Patient seen and examined  No acute distress  Breathing non labored  RRR  RLE dressings changed, incisions without issues. Patient clearly fires tibialis anterior, EHL, and gastrocnemius/soleus. Sensation is intact to light touch throughout superficial peroneal, deep peroneal, tibial, saphenous, and sural nerve distributions. Strong and palpable 2+ dorsalis pedis and posterior tibial pulses with capillary refill less than 2 seconds. No lower leg tenderness or discomfort. RUE surgical Aquacel dressing CDI, arm in sling with abduction pillow, DNVI, moves all fingers, cap refill <2 sec.     Recent Labs     10/14/23  0444 10/15/23  0706 10/16/23  0709   WBC 17.0* 14.1* 13.4*   RBC 3.22* 3.26* 3.47*   HEMOGLOBIN 9.3* 9.5* 10.0*   HEMATOCRIT 29.0* 28.1* 29.1*   MCV 90.1 86.2 83.9   MCH 28.9 29.1 28.8   MCHC 32.1* 33.8 34.4   RDW 42.4 39.5 38.8   PLATELETCT 248 292 373   MPV 10.2 9.7 9.6       Active Hospital Problems    Diagnosis     Hyponatremia [E87.1]     Leucocytosis [D72.829]     Bradycardia [R00.1]     Femoral neck fracture ([facility]) [S72.009A]     ACP (advance care planning) [Z71.89]     Closed right hip fracture, initial encounter ([facility]) [S72.001A]     Hyperlipidemia [E78.5]     Hypertension [I10]     Diabetes ([facility]) [E11.9]        Assessment/Plan:  Patient doing well RLE   RUE and RLE dressings CDI  RLE dressings changed, incisions without issues  Cleared for DC by ortho pending medicine clearance  POD#4 S/P Open treatment of right proximal humerus fracture with reverse total shoulder arthroplasty  POD#8 S/P  Open " treatment with intramedullary nailing, right intertrochanteric femur fracture.  Wt bearing status - NWB  Wound care/Drains - Dressings to be changed every other day by nursing. Or PRN for saturation starting POD#2  Future Procedures - none planned   Lovenox: Start 10/8, Duration-until ambulatory > 150'  Sutures/Staples out- 14-21 days post operatively. Removal will completed by ortho mid levels only.  PT/OT-initiated  Antibiotics: Perioperative completed  DVT Prophylaxis- TEDS/SCDs/Foot pumps  Burroughs-not needed per ortho  Case Coordination for Discharge Planning - Disposition per therapy recs.

## 2023-10-17 LAB
ANION GAP SERPL CALC-SCNC: 7 MMOL/L (ref 7–16)
BASOPHILS # BLD AUTO: 0.4 % (ref 0–1.8)
BASOPHILS # BLD: 0.05 K/UL (ref 0–0.12)
BUN SERPL-MCNC: 11 MG/DL (ref 8–22)
CALCIUM SERPL-MCNC: 8.4 MG/DL (ref 8.5–10.5)
CHLORIDE SERPL-SCNC: 96 MMOL/L (ref 96–112)
CO2 SERPL-SCNC: 24 MMOL/L (ref 20–33)
CREAT SERPL-MCNC: 0.44 MG/DL (ref 0.5–1.4)
EOSINOPHIL # BLD AUTO: 0.15 K/UL (ref 0–0.51)
EOSINOPHIL NFR BLD: 1.2 % (ref 0–6.9)
ERYTHROCYTE [DISTWIDTH] IN BLOOD BY AUTOMATED COUNT: 39.5 FL (ref 35.9–50)
GFR SERPLBLD CREATININE-BSD FMLA CKD-EPI: 104 ML/MIN/1.73 M 2
GLUCOSE BLD STRIP.AUTO-MCNC: 127 MG/DL (ref 65–99)
GLUCOSE BLD STRIP.AUTO-MCNC: 151 MG/DL (ref 65–99)
GLUCOSE BLD STRIP.AUTO-MCNC: 158 MG/DL (ref 65–99)
GLUCOSE BLD STRIP.AUTO-MCNC: 232 MG/DL (ref 65–99)
GLUCOSE SERPL-MCNC: 124 MG/DL (ref 65–99)
HCT VFR BLD AUTO: 28.4 % (ref 37–47)
HGB BLD-MCNC: 9.5 G/DL (ref 12–16)
IMM GRANULOCYTES # BLD AUTO: 0.16 K/UL (ref 0–0.11)
IMM GRANULOCYTES NFR BLD AUTO: 1.3 % (ref 0–0.9)
LYMPHOCYTES # BLD AUTO: 1.97 K/UL (ref 1–4.8)
LYMPHOCYTES NFR BLD: 15.6 % (ref 22–41)
MAGNESIUM SERPL-MCNC: 2.1 MG/DL (ref 1.5–2.5)
MCH RBC QN AUTO: 28.7 PG (ref 27–33)
MCHC RBC AUTO-ENTMCNC: 33.5 G/DL (ref 32.2–35.5)
MCV RBC AUTO: 85.8 FL (ref 81.4–97.8)
MONOCYTES # BLD AUTO: 1.47 K/UL (ref 0–0.85)
MONOCYTES NFR BLD AUTO: 11.7 % (ref 0–13.4)
NEUTROPHILS # BLD AUTO: 8.8 K/UL (ref 1.82–7.42)
NEUTROPHILS NFR BLD: 69.8 % (ref 44–72)
NRBC # BLD AUTO: 0 K/UL
NRBC BLD-RTO: 0 /100 WBC (ref 0–0.2)
PHOSPHATE SERPL-MCNC: 3 MG/DL (ref 2.5–4.5)
PLATELET # BLD AUTO: 417 K/UL (ref 164–446)
PMV BLD AUTO: 9.3 FL (ref 9–12.9)
POTASSIUM SERPL-SCNC: 4.3 MMOL/L (ref 3.6–5.5)
RBC # BLD AUTO: 3.31 M/UL (ref 4.2–5.4)
SODIUM SERPL-SCNC: 127 MMOL/L (ref 135–145)
WBC # BLD AUTO: 12.6 K/UL (ref 4.8–10.8)

## 2023-10-17 PROCEDURE — 84100 ASSAY OF PHOSPHORUS: CPT

## 2023-10-17 PROCEDURE — A9270 NON-COVERED ITEM OR SERVICE: HCPCS | Performed by: NURSE PRACTITIONER

## 2023-10-17 PROCEDURE — 770001 HCHG ROOM/CARE - MED/SURG/GYN PRIV*

## 2023-10-17 PROCEDURE — 36415 COLL VENOUS BLD VENIPUNCTURE: CPT

## 2023-10-17 PROCEDURE — A9270 NON-COVERED ITEM OR SERVICE: HCPCS | Performed by: STUDENT IN AN ORGANIZED HEALTH CARE EDUCATION/TRAINING PROGRAM

## 2023-10-17 PROCEDURE — 82962 GLUCOSE BLOOD TEST: CPT

## 2023-10-17 PROCEDURE — 99233 SBSQ HOSP IP/OBS HIGH 50: CPT | Performed by: INTERNAL MEDICINE

## 2023-10-17 PROCEDURE — 700102 HCHG RX REV CODE 250 W/ 637 OVERRIDE(OP): Performed by: INTERNAL MEDICINE

## 2023-10-17 PROCEDURE — 700102 HCHG RX REV CODE 250 W/ 637 OVERRIDE(OP): Performed by: STUDENT IN AN ORGANIZED HEALTH CARE EDUCATION/TRAINING PROGRAM

## 2023-10-17 PROCEDURE — 700102 HCHG RX REV CODE 250 W/ 637 OVERRIDE(OP): Performed by: NURSE PRACTITIONER

## 2023-10-17 PROCEDURE — 85025 COMPLETE CBC W/AUTO DIFF WBC: CPT

## 2023-10-17 PROCEDURE — 83735 ASSAY OF MAGNESIUM: CPT

## 2023-10-17 PROCEDURE — 80048 BASIC METABOLIC PNL TOTAL CA: CPT

## 2023-10-17 PROCEDURE — 97530 THERAPEUTIC ACTIVITIES: CPT | Mod: CQ

## 2023-10-17 PROCEDURE — 700111 HCHG RX REV CODE 636 W/ 250 OVERRIDE (IP): Mod: JZ | Performed by: STUDENT IN AN ORGANIZED HEALTH CARE EDUCATION/TRAINING PROGRAM

## 2023-10-17 PROCEDURE — A9270 NON-COVERED ITEM OR SERVICE: HCPCS | Performed by: INTERNAL MEDICINE

## 2023-10-17 RX ADMIN — ENOXAPARIN SODIUM 40 MG: 100 INJECTION SUBCUTANEOUS at 17:14

## 2023-10-17 RX ADMIN — INSULIN LISPRO 3 UNITS: 100 INJECTION, SOLUTION INTRAVENOUS; SUBCUTANEOUS at 17:13

## 2023-10-17 RX ADMIN — SODIUM CHLORIDE 1 G: 1 TABLET ORAL at 17:14

## 2023-10-17 RX ADMIN — SODIUM CHLORIDE 1 G: 1 TABLET ORAL at 08:13

## 2023-10-17 RX ADMIN — INSULIN GLARGINE-YFGN 14 UNITS: 100 INJECTION, SOLUTION SUBCUTANEOUS at 17:13

## 2023-10-17 RX ADMIN — INSULIN LISPRO 4 UNITS: 100 INJECTION, SOLUTION INTRAVENOUS; SUBCUTANEOUS at 12:08

## 2023-10-17 RX ADMIN — METHOCARBAMOL 500 MG: 500 TABLET ORAL at 12:33

## 2023-10-17 RX ADMIN — METHOCARBAMOL 500 MG: 500 TABLET ORAL at 21:28

## 2023-10-17 RX ADMIN — DOCUSATE SODIUM 50 MG AND SENNOSIDES 8.6 MG 2 TABLET: 8.6; 5 TABLET, FILM COATED ORAL at 06:07

## 2023-10-17 RX ADMIN — ACETAMINOPHEN 650 MG: 325 TABLET, FILM COATED ORAL at 21:28

## 2023-10-17 RX ADMIN — DOCUSATE SODIUM 50 MG AND SENNOSIDES 8.6 MG 2 TABLET: 8.6; 5 TABLET, FILM COATED ORAL at 17:14

## 2023-10-17 RX ADMIN — SODIUM CHLORIDE 1 G: 1 TABLET ORAL at 12:33

## 2023-10-17 RX ADMIN — ATORVASTATIN CALCIUM 40 MG: 40 TABLET, FILM COATED ORAL at 17:14

## 2023-10-17 RX ADMIN — ACETAMINOPHEN 650 MG: 325 TABLET, FILM COATED ORAL at 12:34

## 2023-10-17 RX ADMIN — METHOCARBAMOL 500 MG: 500 TABLET ORAL at 08:13

## 2023-10-17 RX ADMIN — METHOCARBAMOL 500 MG: 500 TABLET ORAL at 17:13

## 2023-10-17 RX ADMIN — POLYETHYLENE GLYCOL 3350 1 PACKET: 17 POWDER, FOR SOLUTION ORAL at 06:08

## 2023-10-17 RX ADMIN — METOPROLOL SUCCINATE 50 MG: 50 TABLET, EXTENDED RELEASE ORAL at 06:07

## 2023-10-17 RX ADMIN — INSULIN LISPRO 3 UNITS: 100 INJECTION, SOLUTION INTRAVENOUS; SUBCUTANEOUS at 21:36

## 2023-10-17 ASSESSMENT — COGNITIVE AND FUNCTIONAL STATUS - GENERAL
CLIMB 3 TO 5 STEPS WITH RAILING: TOTAL
MOBILITY SCORE: 10
MOVING FROM LYING ON BACK TO SITTING ON SIDE OF FLAT BED: UNABLE
WALKING IN HOSPITAL ROOM: A LOT
TURNING FROM BACK TO SIDE WHILE IN FLAT BAD: A LOT
MOVING TO AND FROM BED TO CHAIR: UNABLE
SUGGESTED CMS G CODE MODIFIER MOBILITY: CL
STANDING UP FROM CHAIR USING ARMS: A LITTLE

## 2023-10-17 ASSESSMENT — GAIT ASSESSMENTS: GAIT LEVEL OF ASSIST: UNABLE TO PARTICIPATE

## 2023-10-17 ASSESSMENT — PAIN DESCRIPTION - PAIN TYPE
TYPE: ACUTE PAIN
TYPE: ACUTE PAIN;SURGICAL PAIN
TYPE: ACUTE PAIN;SURGICAL PAIN
TYPE: ACUTE PAIN
TYPE: ACUTE PAIN

## 2023-10-17 NOTE — PROGRESS NOTES
68yoF with right IT femur fx s/p IMN 10/8 and right proximal humerus fracture s/p reverse TSA 10/12.    S: Resting comfortably, no complaints.    O:    Vitals:    10/17/23 1005   BP: (!) 143/86   Pulse: 84   Resp: 17   Temp: 36.4 °C (97.5 °F)   SpO2: 100%     Exam:  General-NAD, alert and following commands  RUE-sling in place, SILT AX/M/R/U dist, +AIN/PIN/M/R/U motor, palp radial pulse  RLE-thigh dressings c/d/I, NVI distally    Hct 28.4    A: 68yoF with right IT femur fx s/p IMN 10/8 and right proximal humerus fracture s/p reverse TSA 10/12.    Recs:  --WBAT RLE, NWB RUE with shoulder immobilizer  --okay for right elbow ROM and pendulum exercises right shoulder, no AROM shoulder, no ER past neutral for now  --continue PT/OT for mobilization   --continue lovenox and SCDs while inpatient  --fu 2 weeks postop

## 2023-10-17 NOTE — PROGRESS NOTES
Notified oncall MD patient had no bowel movement since 10/11, bowel movement regimen initiated. Patient refused suppository. MD ordered NS bolus 500ml. Patient had a small soft bowel movement

## 2023-10-17 NOTE — CARE PLAN
The patient is Stable - Low risk of patient condition declining or worsening    Shift Goals  Clinical Goals: mobility, pain control, BM  Patient Goals: comfort  Family Goals: not present    Progress made toward(s) clinical / shift goals:    Problem: Fall Risk  Goal: Patient will remain free from falls  Outcome: Progressing  Note: Safety precautions are in place including bed locked and in lowest position, upper bed rails up, bed alarm on, call light within reach, treaded socks on, tray table and personal belongings within reach.        Patient is not progressing towards the following goals:

## 2023-10-17 NOTE — THERAPY
Physical Therapy   Daily Treatment     Patient Name: Miley Whittington  Age:  68 y.o., Sex:  female  Medical Record #: [id number]  Today's Date: 10/17/2023     Precautions  Precautions: Fall Risk;Weight Bearing As Tolerated Right Lower Extremity;Non Weight Bearing Right Upper Extremity;Immobilizer Right Upper Extremity  Comments: pendulum and elbow ROM okay    Assessment    Pt greeted and seen for PT treatment. Pt req'd Marjan bringing R LE across bed for sitting, used bed rail w/ L hand to sit. Pt req'd MaxA HHA to stand today and assisting bringing COG over GISELLE in standing prior to taking steps. Worked on lift off from sitting surface w/ L UE support, VC for foot placement needed. Pt currently limited by impaired balance 2/2 WB status and pain, decreased strength and decreased activity tolerance which negatively impacts functional mobility. Pt will continue to benefit from skilled PT to address deficits.       Plan    Treatment Plan Status: Continue Current Treatment Plan  Type of Treatment: Bed Mobility, Equipment, Gait Training, Neuro Re-Education / Balance, Therapeutic Activities, Therapeutic Exercise  Treatment Frequency: 4 Times per Week  Treatment Duration: Until Therapy Goals Met    DC Equipment Recommendations:  (possibly wheelchair with leg rests and removeable arm rests, quad cane-TBD)  Discharge Recommendations: Recommend post-acute placement for additional physical therapy services prior to discharge home       10/17/23 0942   Cognition    Comments very pleasant, cooperative   Balance   Sitting Balance (Static) Fair   Sitting Balance (Dynamic) Fair   Standing Balance (Static) Poor +   Standing Balance (Dynamic) Poor +   Weight Shift Sitting Fair   Weight Shift Standing Poor   Skilled Intervention Verbal Cuing;Compensatory Strategies   Comments w/ gema walker   Bed Mobility    Supine to Sit Minimal Assist   Scooting Standby Assist   Skilled Intervention Verbal Cuing;Facilitation   Comments assist bringing R  LE across bed, VC for weight shifting to offload for scooting   Gait Analysis   Gait Level Of Assist Unable to Participate   Comments focused on STS this session   Functional Mobility   Sit to Stand Maximal Assist   Bed, Chair, Wheelchair Transfer Minimal Assist   Transfer Method Stand Step   Mobility bed>chair   Skilled Intervention Verbal Cuing;Sequencing;Tactile Cuing;Facilitation;Compensatory Strategies   Comments poor anterior weight shifting in prep for standing and to shift COG over GISELLE in standing. Worked on inital lift off x6 w/ L UE on gema walker and  Pt requires VC to not use R UE, she frequently tries to reach for things with her R hand, immobilizer sling on.   Short Term Goals    Short Term Goal # 1 supine<>sit min A in 6 visits to progress bed mobility   Goal Outcome # 1 Progressing as expected   Short Term Goal # 2 EOB<>WC with slide board or squat pivot min A in 6 visits for progressing transfers   Goal Outcome # 2 Goal met   Short Term Goal # 3 WC propulsion and management 100ft supervised in 6 visits for household mobility   Goal Outcome # 3 Goal not met   Short Term Goal # 4 sit to stand with hemiwalker mod A in 6 visits to progress with standing transfers   Goal Outcome # 4 Progressing as expected   Supervising Physical Therapist (PTA Treatments Only)   Supervising Physical Therapist Vannessa Shaver

## 2023-10-17 NOTE — PROGRESS NOTES
"      Orthopaedic Progress Note    Interval changes:  Patient doing well RLE   RUE and RLE dressings CDI  RLE dressings CDI  Cleared for DC by ortho pending medicine clearance    ROS - Patient denies any new issues.  Pain well controlled.    BP (!) 143/86   Pulse 84   Temp 36.4 °C (97.5 °F) (Temporal)   Resp 17   Ht 1.6 m (5' 3\")   Wt 71 kg (156 lb 8.4 oz)   SpO2 100%     Patient seen and examined  No acute distress  Breathing non labored  RRR  RLE dressings CDI. Patient clearly fires tibialis anterior, EHL, and gastrocnemius/soleus. Sensation is intact to light touch throughout superficial peroneal, deep peroneal, tibial, saphenous, and sural nerve distributions. Strong and palpable 2+ dorsalis pedis and posterior tibial pulses with capillary refill less than 2 seconds. No lower leg tenderness or discomfort. RUE surgical Aquacel dressing CDI, arm in sling with abduction pillow, DNVI, moves all fingers, cap refill <2 sec.     Recent Labs     10/15/23  0706 10/16/23  0709 10/17/23  0349   WBC 14.1* 13.4* 12.6*   RBC 3.26* 3.47* 3.31*   HEMOGLOBIN 9.5* 10.0* 9.5*   HEMATOCRIT 28.1* 29.1* 28.4*   MCV 86.2 83.9 85.8   MCH 29.1 28.8 28.7   MCHC 33.8 34.4 33.5   RDW 39.5 38.8 39.5   PLATELETCT 292 373 417   MPV 9.7 9.6 9.3       Active Hospital Problems    Diagnosis     Hyponatremia [E87.1]     Leucocytosis [D72.829]     Bradycardia [R00.1]     Femoral neck fracture ([facility]) [S72.009A]     ACP (advance care planning) [Z71.89]     Closed right hip fracture, initial encounter ([facility]) [S72.001A]     Hyperlipidemia [E78.5]     Hypertension [I10]     Diabetes ([facility]) [E11.9]        Assessment/Plan:  Patient doing well RLE   RUE and RLE dressings CDI  RLE dressings CDI  Cleared for DC by ortho pending medicine clearance  POD#5 S/P Open treatment of right proximal humerus fracture with reverse total shoulder arthroplasty  POD#9 S/P  Open treatment with intramedullary nailing, right intertrochanteric femur fracture.  Wt bearing " status - NWB  Wound care/Drains - Dressings to be changed every other day by nursing. Or PRN for saturation starting POD#2  Future Procedures - none planned   Lovenox: Start 10/8, Duration-until ambulatory > 150'  Sutures/Staples out- 14-21 days post operatively. Removal will completed by ortho mid levels only.  PT/OT-initiated  Antibiotics: Perioperative completed  DVT Prophylaxis- TEDS/SCDs/Foot pumps  Burroughs-not needed per ortho  Case Coordination for Discharge Planning - Disposition per therapy recs.

## 2023-10-18 ENCOUNTER — PATIENT OUTREACH (OUTPATIENT)
Dept: SCHEDULING | Facility: IMAGING CENTER | Age: 68
End: 2023-10-18
Payer: OTHER GOVERNMENT

## 2023-10-18 VITALS
TEMPERATURE: 97.7 F | OXYGEN SATURATION: 94 % | HEART RATE: 80 BPM | DIASTOLIC BLOOD PRESSURE: 76 MMHG | BODY MASS INDEX: 27.73 KG/M2 | SYSTOLIC BLOOD PRESSURE: 144 MMHG | RESPIRATION RATE: 15 BRPM | WEIGHT: 156.53 LBS | HEIGHT: 63 IN

## 2023-10-18 LAB
ALBUMIN SERPL BCP-MCNC: 2.9 G/DL (ref 3.2–4.9)
BUN SERPL-MCNC: 10 MG/DL (ref 8–22)
CALCIUM ALBUM COR SERPL-MCNC: 9.5 MG/DL (ref 8.5–10.5)
CALCIUM SERPL-MCNC: 8.6 MG/DL (ref 8.5–10.5)
CHLORIDE SERPL-SCNC: 99 MMOL/L (ref 96–112)
CO2 SERPL-SCNC: 24 MMOL/L (ref 20–33)
CREAT SERPL-MCNC: 0.47 MG/DL (ref 0.5–1.4)
ERYTHROCYTE [DISTWIDTH] IN BLOOD BY AUTOMATED COUNT: 40.4 FL (ref 35.9–50)
GFR SERPLBLD CREATININE-BSD FMLA CKD-EPI: 102 ML/MIN/1.73 M 2
GLUCOSE BLD STRIP.AUTO-MCNC: 138 MG/DL (ref 65–99)
GLUCOSE BLD STRIP.AUTO-MCNC: 241 MG/DL (ref 65–99)
GLUCOSE SERPL-MCNC: 141 MG/DL (ref 65–99)
HCT VFR BLD AUTO: 28.2 % (ref 37–47)
HGB BLD-MCNC: 9.3 G/DL (ref 12–16)
MCH RBC QN AUTO: 28.5 PG (ref 27–33)
MCHC RBC AUTO-ENTMCNC: 33 G/DL (ref 32.2–35.5)
MCV RBC AUTO: 86.5 FL (ref 81.4–97.8)
PHOSPHATE SERPL-MCNC: 4 MG/DL (ref 2.5–4.5)
PLATELET # BLD AUTO: 440 K/UL (ref 164–446)
PMV BLD AUTO: 9.3 FL (ref 9–12.9)
POTASSIUM SERPL-SCNC: 4.6 MMOL/L (ref 3.6–5.5)
RBC # BLD AUTO: 3.26 M/UL (ref 4.2–5.4)
SODIUM SERPL-SCNC: 131 MMOL/L (ref 135–145)
WBC # BLD AUTO: 13.1 K/UL (ref 4.8–10.8)

## 2023-10-18 PROCEDURE — 700102 HCHG RX REV CODE 250 W/ 637 OVERRIDE(OP): Performed by: STUDENT IN AN ORGANIZED HEALTH CARE EDUCATION/TRAINING PROGRAM

## 2023-10-18 PROCEDURE — 80069 RENAL FUNCTION PANEL: CPT

## 2023-10-18 PROCEDURE — 700102 HCHG RX REV CODE 250 W/ 637 OVERRIDE(OP): Performed by: INTERNAL MEDICINE

## 2023-10-18 PROCEDURE — 36415 COLL VENOUS BLD VENIPUNCTURE: CPT

## 2023-10-18 PROCEDURE — 82962 GLUCOSE BLOOD TEST: CPT

## 2023-10-18 PROCEDURE — A9270 NON-COVERED ITEM OR SERVICE: HCPCS | Performed by: STUDENT IN AN ORGANIZED HEALTH CARE EDUCATION/TRAINING PROGRAM

## 2023-10-18 PROCEDURE — A9270 NON-COVERED ITEM OR SERVICE: HCPCS | Performed by: INTERNAL MEDICINE

## 2023-10-18 PROCEDURE — 85027 COMPLETE CBC AUTOMATED: CPT

## 2023-10-18 PROCEDURE — 99239 HOSP IP/OBS DSCHRG MGMT >30: CPT | Performed by: INTERNAL MEDICINE

## 2023-10-18 RX ORDER — POLYETHYLENE GLYCOL 3350 17 G/17G
17 POWDER, FOR SOLUTION ORAL DAILY
Refills: 3 | Status: SHIPPED
Start: 2023-10-19

## 2023-10-18 RX ORDER — INSULIN LISPRO 100 [IU]/ML
INJECTION, SOLUTION INTRAVENOUS; SUBCUTANEOUS
Status: ACTIVE | DISCHARGE
Start: 2023-10-18

## 2023-10-18 RX ORDER — SODIUM CHLORIDE 1 G/1
1 TABLET ORAL 2 TIMES DAILY WITH MEALS
Status: SHIPPED
Start: 2023-10-18

## 2023-10-18 RX ORDER — METHOCARBAMOL 500 MG/1
500 TABLET, FILM COATED ORAL 4 TIMES DAILY
Qty: 120 TABLET | Status: SHIPPED
Start: 2023-10-18

## 2023-10-18 RX ORDER — AMOXICILLIN 250 MG
2 CAPSULE ORAL 2 TIMES DAILY
Qty: 30 TABLET | Refills: 0 | Status: SHIPPED
Start: 2023-10-18

## 2023-10-18 RX ORDER — HYDROCODONE BITARTRATE AND ACETAMINOPHEN 5; 325 MG/1; MG/1
1 TABLET ORAL EVERY 4 HOURS PRN
Qty: 9 TABLET | Refills: 0 | Status: SHIPPED | OUTPATIENT
Start: 2023-10-18 | End: 2023-10-21

## 2023-10-18 RX ORDER — ENOXAPARIN SODIUM 100 MG/ML
40 INJECTION SUBCUTANEOUS DAILY
Status: ACTIVE | DISCHARGE
Start: 2023-10-18

## 2023-10-18 RX ADMIN — METOPROLOL SUCCINATE 50 MG: 50 TABLET, EXTENDED RELEASE ORAL at 05:46

## 2023-10-18 RX ADMIN — OXYCODONE HYDROCHLORIDE 10 MG: 10 TABLET ORAL at 12:59

## 2023-10-18 RX ADMIN — SODIUM CHLORIDE 1 G: 1 TABLET ORAL at 11:50

## 2023-10-18 RX ADMIN — METHOCARBAMOL 500 MG: 500 TABLET ORAL at 08:53

## 2023-10-18 RX ADMIN — INSULIN LISPRO 4 UNITS: 100 INJECTION, SOLUTION INTRAVENOUS; SUBCUTANEOUS at 11:45

## 2023-10-18 RX ADMIN — SODIUM CHLORIDE 1 G: 1 TABLET ORAL at 08:02

## 2023-10-18 RX ADMIN — METHOCARBAMOL 500 MG: 500 TABLET ORAL at 12:59

## 2023-10-18 ASSESSMENT — PAIN DESCRIPTION - PAIN TYPE
TYPE: ACUTE PAIN;SURGICAL PAIN
TYPE: ACUTE PAIN;SURGICAL PAIN

## 2023-10-18 NOTE — DISCHARGE PLANNING
Case Management Discharge Planning    Admission Date: 10/7/2023  GMLOS: 4.5  ALOS: 11    6-Clicks ADL Score: 15  6-Clicks Mobility Score: 10  PT and/or OT Eval ordered: Yes  Post-acute Referrals Ordered: Yes  Post-acute Choice Obtained: Yes  Has referral(s) been sent to post-acute provider:  Yes      Anticipated Discharge Dispo: Discharge Disposition: D/T to SNF with Medicare cert in anticipation of skilled care (03)    DME Needed: No    Action(s) Taken: Updated Provider/Nurse on Discharge Plan    Escalations Completed: None    Medically Clear: Yes    Next Steps: [facility] at 13:00.  Reshane'chuy GUZMAN do dc summary and write 3 days Narcotics RX to placed in dc packet.   COBRA, chart copy, and RX in DC Packet and to be given to TASHA Oneill.     Barriers to Discharge: None    Is the patient up for discharge tomorrow: No

## 2023-10-18 NOTE — CARE PLAN
The patient is Stable - Low risk of patient condition declining or worsening    Shift Goals  Clinical Goals: pain mgmt, mobility  Patient Goals: Pain, comfrot  Family Goals: not present    Progress made toward(s) clinical / shift goals:    Problem: Fall Risk  Goal: Patient will remain free from falls  Outcome: Progressing  Note: Safety precautions are in place including bed locked and in lowest position, upper bed rails up, bed alarm on, call light within reach, treaded socks on, tray table and personal belongings within reach.        Patient is not progressing towards the following goals:

## 2023-10-18 NOTE — DISCHARGE PLANNING
Agency/Facility Name: [facility]  Spoke To: Paulina  Outcome: Bed available today, facility transport to  patient at 1300.    RNCM notified

## 2023-10-18 NOTE — PROGRESS NOTES
[facility] Medicine Daily Progress Note    Date of Service  10/17/2023    Chief Complaint  Miley Whittington is a 68 y.o. female admitted 10/7/2023 with ground-level fall  Hospital Course  68 female with past medical history of COPD, hypertension, hyperlipidemia presented after ground-level fall subsequent x-ray noted with comminuted and displaced multipart fracture of the right proximal humerus and nondisplaced intertrochanteric femur fracture.  CT of right shoulder showed comminuted humeral neck fracture with significant impaction and displacement  S/p Open treatment with intramedullary nailing, right   intertrochanteric femur fracture on 10/8.    Status post open treatment of right proximal humerus fracture with reverse total shoulder arthroplasty 10/12    Patient did not appear to have persistent leukocytosis during admission.  She did not endorse any symptoms localizing infection.  Urinalysis was not concerning for infection.  Chest x-ray showed no acute cardiopulmonary disease.  Procalcitonin was negative x3.  Patient did receive perioperative vancomycin and cefazolin per Ortho        Interval Problem Update  Patient was seen and examined at bedside.  I have personally reviewed and interpreted vitals, labs, and imaging.    10/10.  Afebrile.  Stable vitals.  On room air.  Leukocytosis at 11.9.  Urinalysis not suggestive of infection.  Procalcitonin within normal limits x2.  Chest x-ray with no acute cardiopulmonary disease.  Denies fever, chills, chest pains, shortness of breath, cough, congestion.  She does report some constipation.  10/11.  Afebrile.  Hypertensive this morning.  On room air.  Replete mag.  Denies fevers, chills, chest pain, shortness of breath.  Shoulder and hip pain are controlled.  Plan for reverse shoulder replacement tomorrow.  N.p.o. after midnight.  Discussed with orthopedic surgery.  10/12.  Afebrile.  Stable vitals.  On 0-2 L nasal cannula.  Denies fevers, chills, chest pains,  "shortness of breath.  Tolerated right shoulder surgery this morning.  Shoulder and hip pain are controlled.  10/13.  Afebrile.  Stable vitals.  On room air.  Leukocytosis now trending up to 16.2.  Procalcitonin still normal.  Per orthopedic surgery patient is on perioperative cefazolin and vancomycin.  Hemoglobin dropped from 11.1 to 9.6.  No obvious bleeding/bruising.  Replete mag.  Denies fever, chills, chest pain, shortness of breath.  Right shoulder pain hurts little more than hip.  10/14.  Afebrile.  Stable vitals.  On 1-2 L nasal cannula.  Sodium 128.  Replete phosphorus, magnesium.  Denies fever, chills, chest pain, shortness of breath.  Continue PT/OT.  Recommending post acute placement.  10/15.  Afebrile.  Stable vitals.  On 0-2L NC.  Denies fever, chills, chest pains, shortness of breath, dysuria.  Noted drop in sodium to 125.  Patient appears euvolemic on exam.  Ordered urine studies, serum osmolarity.  Started on salt tabs.  10/16.  Afebrile.  Mild hypertension.  On room air.  Denies fever, chills, chest pains, shortness of breath.  Shoulder and hip pain are controlled.  Pending placement.  Trend to correct sodium of 125.  Increase salt tabs.  Fluid restriction.\"    10/17.I reviewed the chart along with vitals, labs, imaging, test (both pending and resulted) and recommendations from specialists and interdisciplinary team.  Na 127  She did have free water on the table. I advised to minimize that and she can salt her food. SNF pending.     I have discussed this patient's plan of care and discharge plan at IDT rounds today with Case Management, Nursing, Nursing leadership, and other members of the IDT team.    Consultants/Specialty  orthopedics    Code Status  Full Code    Disposition  Medically Cleared  I have placed the appropriate orders for post-discharge needs.    Review of Systems  Review of Systems   Musculoskeletal:  Positive for joint pain.        Physical Exam  Temp:  [36.4 °C (97.5 °F)-36.5 °C " (97.7 °F)] 36.5 °C (97.7 °F)  Pulse:  [72-88] 80  Resp:  [16-17] 16  BP: (134-149)/(64-86) 146/64  SpO2:  [92 %-100 %] 95 %    Physical Exam  Vitals and nursing note reviewed.   Constitutional:       General: She is not in acute distress.  HENT:      Head: Normocephalic and atraumatic.      Right Ear: External ear normal.      Left Ear: External ear normal.      Nose: Nose normal.      Mouth/Throat:      Mouth: Mucous membranes are moist.   Eyes:      General: No scleral icterus.     Conjunctiva/sclera: Conjunctivae normal.   Cardiovascular:      Rate and Rhythm: Normal rate and regular rhythm.      Pulses: Normal pulses.      Heart sounds: No murmur heard.     No friction rub. No gallop.   Pulmonary:      Effort: Pulmonary effort is normal. No respiratory distress.      Breath sounds: Normal breath sounds. No stridor. No wheezing, rhonchi or rales.   Chest:      Chest wall: No tenderness.   Abdominal:      General: Abdomen is flat. Bowel sounds are normal. There is no distension.      Palpations: Abdomen is soft.      Tenderness: There is no abdominal tenderness. There is no guarding or rebound.   Musculoskeletal:         General: No swelling, tenderness or deformity. Normal range of motion.      Cervical back: Normal range of motion and neck supple. No rigidity.      Comments: Range of motion limited in right hip due to pain   Skin:     General: Skin is warm.      Coloration: Skin is not jaundiced.   Neurological:      General: No focal deficit present.      Mental Status: She is alert and oriented to person, place, and time. Mental status is at baseline.      Motor: Weakness present.   Psychiatric:         Mood and Affect: Mood normal.         Behavior: Behavior normal.         Thought Content: Thought content normal.         Judgment: Judgment normal.         Fluids    Intake/Output Summary (Last 24 hours) at 10/17/2023 2356  Last data filed at 10/17/2023 2132  Gross per 24 hour   Intake --   Output 800 ml   Net  -800 ml         Laboratory  Recent Labs     10/15/23  0706 10/16/23  0709 10/17/23  0349   WBC 14.1* 13.4* 12.6*   RBC 3.26* 3.47* 3.31*   HEMOGLOBIN 9.5* 10.0* 9.5*   HEMATOCRIT 28.1* 29.1* 28.4*   MCV 86.2 83.9 85.8   MCH 29.1 28.8 28.7   MCHC 33.8 34.4 33.5   RDW 39.5 38.8 39.5   PLATELETCT 292 373 417   MPV 9.7 9.6 9.3       Recent Labs     10/15/23  0706 10/16/23  0709 10/17/23  0349   SODIUM 125* 125* 127*   POTASSIUM 5.4 4.5 4.3   CHLORIDE 93* 92* 96   CO2 25 27 24   GLUCOSE 119* 142* 124*   BUN 12 12 11   CREATININE 0.49* 0.45* 0.44*   CALCIUM 8.6 8.4* 8.4*                     Imaging  DX-SHOULDER 2+ RIGHT   Final Result      Right shoulder arthroplasty without immediate postoperative hardware complication      DX-CHEST-LIMITED (1 VIEW)   Final Result         No acute cardiopulmonary abnormalities are identified.      DX-PORTABLE FLUOROSCOPY < 1 HOUR   Final Result      Portable fluoroscopy utilized for 26 seconds.         INTERPRETING LOCATION: [address] [facility], [id number]      DX-HIP-UNILATERAL-W/O PELVIS-2/3 VIEWS RIGHT   Final Result      Digitized intraoperative radiograph is submitted for review. This examination is not for diagnostic purpose but for guidance during a surgical procedure. Please see the patient's chart for full procedural details.         INTERPRETING LOCATION: [address] [facility], [address]      CT-SHOULDER W/O PLUS RECONS RIGHT   Final Result      1.  Comminuted humeral neck fracture is identified with significant impaction and displacement at the fracture site as described above.      2.  No evidence of dislocation at the glenohumeral joint.      3.  No other fracture or malalignment is identified.      EL-[id number] FILM X-RAY   Final Result      DJ-[id number] FILM X-RAY   Final Result      OUTSIDE IMAGES-DX CHEST   Final Result             Assessment/Plan  * Femoral neck fracture. hyponatremia (HCC)  Assessment & Plan  10/16/2023  S/p Open treatment with intramedullary  "nailing, right   intertrochanteric femur fracture.  PT/OT eval  Pain management, on IV narcotics, monitor for toxicity\"    SNF planned  Avoid free water and salt food.    Hyponatremia  Assessment & Plan  10/16/2023  Noted drop in sodium to 125.  Patient appears euvolemic on exam.  Ordered urine studies, serum osmolarity.    Increase salt tabs  fluid restriction\"    Free water avoidance  OK to salt food.    Bradycardia  Assessment & Plan  10/16/2023  Bradycardia resolved  Resume metoprolol, decrease dose to 50 XL    Leucocytosis  Assessment & Plan  10/16/2023  Likely stress-induced  No concern of ongoing infection  Denies cough, dysuria remained afebrile  Urinalysis not concerning for infection  Chest x-ray unremarkable, procalcitonin negative x2    Perioperative vancomycin and cefazolin per Ortho\"    Leukocytosis trending down    ACP (advance care planning)  Assessment & Plan  Full code    Hyperlipidemia  Assessment & Plan  10/16/2023  Continue lipitor    Hypertension  Assessment & Plan  10/16/2023  Continue home medications\"    Vitals:    10/17/23 2133   BP: (!) 146/64   Pulse: 80   Resp: 16   Temp: 36.5 °C (97.7 °F)   SpO2: 95%     Stable    Diabetes (HCC)  Assessment & Plan  10/16/2023  Sliding scale\"    BG 100s stable.         VTE prophylaxis: lovenox    I have performed a physical exam and reviewed and updated ROS and Plan today (10/17/2023). In review of yesterday's note (10/16/2023), there are no changes except as documented above.         "

## 2023-10-18 NOTE — DISCHARGE SUMMARY
Discharge Summary    CHIEF COMPLAINT ON ADMISSION  Fall    Reason for Admission  F    Admission Date  10/7/2023     CODE STATUS  Full Code    HPI & HOSPITAL COURSE  This is a 68 y.o. female here with Fall   Please review Dr. René Bowling M.D. notes for further details of history of present illness, past medical/social/family histories, allergies and medications. Please review Dr. Castellano, Orthopedics consultation notes.  She has a history of COPD, hypertension, hyperlipidemia presented after ground-level fall. She was seen at an outside facility. Subsequent x-ray noted with comminuted and displaced multipart fracture of the right femoral neck and nondisplaced intertrochanteric femur fracture. Leukocytosis, , Na 137. normal Cr- levels at outside facility labs. She was transferred to [facility].  Orthedics consulted, and she underwent open treatment with intramedullary nailing for right intertrochanteric femur fracture and open treatment of right proximal humerus fracture with   reverse total shoulder arthroplasty for right comminuted displaced proximal humerus   Fracture by Dr. Castellano. She was accepted to a skilled facility.     She had a drop in Na to 125. She is euvolemic. Fluid restriction and I added free water avoidance as well as salt tabs instituted. Discharge Na now 131. Further workup and follow up defer to primary. Her blood sugars and blood pressures are stable.         At discharge date, Miley Whittington afebrile and hemodynamically stable.  Miley Whittington wanted to be discharged today.      Imaging  DX-SHOULDER 2+ RIGHT   Final Result      Right shoulder arthroplasty without immediate postoperative hardware complication      DX-CHEST-LIMITED (1 VIEW)   Final Result         No acute cardiopulmonary abnormalities are identified.      DX-PORTABLE FLUOROSCOPY < 1 HOUR   Final Result      Portable fluoroscopy utilized for 26 seconds.         INTERPRETING LOCATION: [address] [place] [address] [place], [id number]       DX-HIP-UNILATERAL-W/O PELVIS-2/3 VIEWS RIGHT   Final Result      Digitized intraoperative radiograph is submitted for review. This examination is not for diagnostic purpose but for guidance during a surgical procedure. Please see the patient's chart for full procedural details.         INTERPRETING LOCATION: [address] [facility], [place], [address]      CT-SHOULDER W/O PLUS RECONS RIGHT   Final Result      1.  Comminuted humeral neck fracture is identified with significant impaction and displacement at the fracture site as described above.      2.  No evidence of dislocation at the glenohumeral joint.      3.  No other fracture or malalignment is identified.      [id number] FILM X-RAY   Final Result      [id number] FILM X-RAY   Final Result      OUTSIDE IMAGES-DX CHEST   Final Result                Therefore, she is discharged in fair and stable condition to skilled nursing facility.    The patient met 2-midnight criteria for an inpatient stay at the time of discharge.      FOLLOW UP ITEMS POST DISCHARGE      DISCHARGE DIAGNOSES  Principal Problem:    Femoral neck fracture. hyponatremia (HCC) (POA: Unknown)  Active Problems:    Diabetes (HCC) (POA: Unknown)    Hypertension (POA: Unknown)    Hyperlipidemia (POA: Unknown)    ACP (advance care planning) (POA: Unknown)    Closed right hip fracture, initial encounter (HCC) (POA: Yes)    Leucocytosis (POA: Unknown)    Bradycardia (POA: Unknown)    Hyponatremia (POA: Unknown)        FOLLOW UP  No future appointments.  Robert Castellano M.D.  [address]  [address]  [facility] [id number] [phone]    Follow up  Call ASAP to schedule fu appt for 2 weeks postop  Follow up with DALY Pan or attending at facility upon receipt  Follow up with Dr. Castellano, Orthopedics in 1 week postop visit  NURSING provide resources/pamphlets for  Postoperative instructions from surgeon, diabetes (check blood glucoses at home and have a log for primary provider  to review. Follow diabetic diet. Ask for and review [facility]abetic handbook and pamphlets), hypertension (Check and record blood pressures at home at least twice a day for primary provider to review), narcotic use (Avoid swimming, driving or operating machinery. Treat constipation with prescribed bowel regimen)    MEDICATIONS ON DISCHARGE     Medication List        START taking these medications        Instructions   enoxaparin 40 MG/0.4ML Sosy inj  Commonly known as: Lovenox   Doctor's comments: STOP when more mobile  Inject 40 mg under the skin every day at 6 PM.  Dose: 40 mg     HYDROcodone-acetaminophen 5-325 MG Tabs per tablet  Commonly known as: Norco   Take 1 Tablet by mouth every four hours as needed (severe pain) for up to 3 days.  Dose: 1 Tablet     insulin lispro 100 UNIT/ML Sopn injection PEN  Commonly known as: HumaLOG/AdmeLOG  Replaces: insulin lispro 100 UNIT/ML   For blood glucose<70 mg/dL snacks,=0u,151-200=2u,201-250=4u,251-300=6u,301-350=9u,351-400=12u,>400=15u callMD     methocarbamol 500 MG Tabs  Commonly known as: Robaxin   Take 1 Tablet by mouth 4 times a day.  Dose: 500 mg     polyethylene glycol/lytes 17 g Pack  Start taking on: October 19, 2023  Commonly known as: Miralax   Take 1 Packet by mouth every day.  Dose: 17 g     senna-docusate 8.6-50 MG Tabs  Commonly known as: Pericolace Or Senokot S   Take 2 Tablets by mouth 2 times a day.  Dose: 2 Tablet     sodium chloride 1 GM Tabs  Commonly known as: Salt   Doctor's comments: STOP when Na normalizes.  Take 1 Tablet by mouth 2 times a day with meals.  Dose: 1 g            CHANGE how you take these medications        Instructions   atorvastatin 40 MG Tabs  What changed: Another medication with the same name was removed. Continue taking this medication, and follow the directions you see here.  Commonly known as: Lipitor   Take 1 Tablet by mouth every evening.  Dose: 40 mg     lisinopril 20 MG Tabs  What changed: Another medication with  the same name was removed. Continue taking this medication, and follow the directions you see here.  Commonly known as: Prinivil   Take 20 mg by mouth every day. Indications: High Blood Pressure Disorder  Dose: 20 mg            CONTINUE taking these medications        Instructions   acetaminophen 325 MG Tabs  Commonly known as: Tylenol   Take 1-2 Tablets by mouth as needed. Indications: Pain  Dose: 325-650 mg     amLODIPine 10 MG Tabs  Commonly known as: Norvasc   Take 1 Tablet by mouth every evening. Indications: High Blood Pressure Disorder  Dose: 10 mg     Breo Ellipta 100-25 MCG/ACT Aepb  Generic drug: fluticasone furoate-vilanterol   Inhale 1 Puff every morning. Indications: Chronic Obstructive Lung Disease  Dose: 1 Puff     carboxymethylcellulose 0.5 % Soln  Commonly known as: Refresh Tears   Administer 1 Drop into both eyes as needed.  Dose: 1 Drop     famotidine 20 MG Tabs  Commonly known as: Pepcid   Take 20 mg by mouth every day.  Dose: 20 mg     gabapentin 100 MG Caps  Commonly known as: Neurontin   Take 1 Capsule by mouth at bedtime.  Dose: 100 mg     latanoprost 0.005 % Soln  Commonly known as: Xalatan   Administer 1 Drop into both eyes every evening.  Dose: 1 Drop     magnesium oxide 400 MG Tabs tablet  Commonly known as: Mag-Ox   Take 400 mg by mouth every day.  Dose: 400 mg     metoprolol  MG Tb24  Commonly known as: Toprol XL   Take 100 mg by mouth every day. Indications: High Blood Pressure Disorder  Dose: 100 mg     metoprolol tartrate 50 MG Tabs  Commonly known as: Lopressor   Take 1 Tablet by mouth 2 times a day.  Dose: 50 mg     MULTIVITAMIN PO   Take 1 Tablet by mouth every day.  Dose: 1 Tablet     naproxen 500 MG Tabs  Commonly known as: Naprosyn   Take 1 Tablet by mouth every evening. Indications: Joint Damage causing Pain and Loss of Function, Pain  Dose: 500 mg     omeprazole 20 MG delayed-release capsule  Commonly known as: PriLOSEC   Take 1 Capsule by mouth at bedtime. Indications:  Gastroesophageal Reflux Disease  Dose: 20 mg     Ozempic (0.25 or 0.5 MG/DOSE) 2 MG/1.5ML Sopn  Generic drug: Semaglutide(0.25 or 0.5MG/DOS)   Inject 0.5 mg under the skin every Saturday.  Dose: 0.5 mg     Spiriva Respimat 2.5 mcg/Act Aers  Generic drug: tiotropium   Inhale 2 Inhalation at bedtime. Indications: Chronic Obstructive Lung Disease  Dose: 5 mcg     spironolactone 25 MG Tabs  Commonly known as: Aldactone   Take 1 Tablet by mouth every morning.  Dose: 25 mg     Tresiba FlexTouch 200 UNIT/ML Sopn  Generic drug: Insulin Degludec   Doctor's comments: Replaces levemir  Inject 50 Units under the skin at bedtime. 3 pens per month  Dose: 50 Units     vitamin D2 (Ergocalciferol) 1.25 MG (67635 UT) Caps capsule  Commonly known as: Drisdol   Take  by mouth every 7 days.            STOP taking these medications      insulin lispro 100 UNIT/ML  Commonly known as: HumaLOG  Replaced by: insulin lispro 100 UNIT/ML Sopn injection PEN              Allergies  Allergies   Allergen Reactions    Metformin Shortness of Breath    Sulfa Drugs Anaphylaxis       DIET  Orders Placed This Encounter   Procedures    Diet Order Diet: Consistent CHO (Diabetic); Fluid modifications: (optional): Free Water Restrictions Only     Standing Status:   Standing     Number of Occurrences:   1     Order Specific Question:   Diet:     Answer:   Consistent CHO (Diabetic) [4]     Order Specific Question:   Fluid modifications: (optional)     Answer:   Free Water Restrictions Only [12]       ACTIVITY  As per SNF    LINES, DRAINS, AND WOUNDS  This is an automated list. Peripheral IVs will be removed prior to discharge.  Peripheral IV 10/13/23 20 G Anterior;Left Forearm (Active)   Site Assessment Clean;Dry;Intact 10/18/23 0100   Dressing Type Transparent 10/18/23 0100   Line Status Flushed;Scrubbed the hub prior to access;Saline locked 10/18/23 0100   Dressing Status Clean;Dry;Intact 10/18/23 0100   Dressing Intervention N/A 10/18/23 0100   Date Primary  Tubing Changed 10/14/23 10/14/23 2100   Infiltration Grading ([facility], [facility]) 0 10/18/23 0100   Phlebitis Scale ([facility] Only) 0 10/18/23 0100       Wound 10/08/23 Incision Leg Right xeroform, 4x4, tegaderm (Active)   Site Assessment YESSICA 10/17/23 2138   Periwound Assessment YESSICA 10/17/23 2138   Margins YESSICA 10/17/23 2138   Closure YESSICA 10/17/23 2138   Drainage Amount Small 10/17/23 2138   Drainage Description Serosanguineous 10/17/23 2138   Treatments Ice applied 10/11/23 1000   Dressing Status Dry;Intact;Old drainage 10/17/23 2138   Dressing Changed Observed 10/17/23 2138   Dressing Options Dry Gauze;Transparent Film 10/17/23 2138       Wound 10/12/23 Incision Closed Incision Shoulder Right (Active)   Site Assessment YESSICA 10/17/23 2138   Periwound Assessment YESSICA 10/17/23 2138   Margins YESSICA 10/17/23 2138   Closure YESSICA 10/17/23 2138   Drainage Amount None 10/17/23 2138   Dressing Status Clean;Dry;Intact 10/17/23 2138   Dressing Changed Observed 10/17/23 2138   Dressing Options Hydrocolloid Thick 10/17/23 2138       Peripheral IV 10/13/23 20 G Anterior;Left Forearm (Active)   Site Assessment Clean;Dry;Intact 10/18/23 0100   Dressing Type Transparent 10/18/23 0100   Line Status Flushed;Scrubbed the hub prior to access;Saline locked 10/18/23 0100   Dressing Status Clean;Dry;Intact 10/18/23 0100   Dressing Intervention N/A 10/18/23 0100   Date Primary Tubing Changed 10/14/23 10/14/23 2100   Infiltration Grading ([facility], [facility]) 0 10/18/23 0100   Phlebitis Scale ([facility] Only) 0 10/18/23 0100               MENTAL STATUS ON TRANSFER             CONSULTATIONS  Orthopedics    PROCEDURES  See Dr. Patel's OP NOTEs    LABORATORY  Lab Results   Component Value Date    SODIUM 131 (L) 10/18/2023    POTASSIUM 4.6 10/18/2023    CHLORIDE 99 10/18/2023    CO2 24 10/18/2023    GLUCOSE 141 (H) 10/18/2023    BUN 10 10/18/2023    CREATININE 0.47 (L) 10/18/2023        Lab Results   Component Value Date    WBC 13.1 (H) 10/18/2023    HEMOGLOBIN 9.3  (L) 10/18/2023    HEMATOCRIT 28.2 (L) 10/18/2023    PLATELETCT 440 10/18/2023        Total time of the discharge process exceeds 40 minutes.

## 2023-10-18 NOTE — CARE PLAN
The patient is Stable - Low risk of patient condition declining or worsening    Shift Goals  Clinical Goals: Pain, mobility, safety  Patient Goals: Pain, comfrot  Family Goals: not present    Progress made toward(s) clinical / shift goals:    Problem: Pain - Standard  Goal: Alleviation of pain or a reduction in pain to the patient’s comfort goal  Outcome: Progressing  Note: Patient requests tylenol for pain     Problem: Fall Risk  Goal: Patient will remain free from falls  Outcome: Progressing  Note: Patient is moderate risk for falls, bed alarm is on.      Problem: Skin Integrity  Goal: Skin integrity is maintained or improved  Note: Patient is moderate risk for skin breakdown, patient is on low air loss mattress, taps in place, q 2 turns initiated.        Patient is not progressing towards the following goals:

## 2023-10-18 NOTE — DISCHARGE PLANNING
Agency/Facility Name: [facility]  Spoke To: Paulina  Outcome: Checking on bed availability will update DPA.

## 2023-10-18 NOTE — PROGRESS NOTES
"- [facility] to [facility]   - AVS reviewed, signed and dated by patient.   - Controlled Substance contract reviewed, signed and dated by patient.   - Patient's PIV removed.   - Envelope handed to escort with COBRA carbon copy, copy of AVS, Discharge Summary and Face Sheet.  - Patient understands need for follow-up care and understands instructions to make appointment with surgical team.  - Patient is A+O x 4 and verbalized understanding of instructions. Vital signs stable and patient is content with care and plan for discharge. All belongings collected by patient and discharged with patient.    BP (!) 144/76   Pulse 80   Temp 36.5 °C (97.7 °F) (Temporal)   Resp 15   Ht 1.6 m (5' 3\")   Wt 71 kg (156 lb 8.4 oz)   SpO2 94%    "

## 2023-10-18 NOTE — PROGRESS NOTES
4 Eyes Skin Assessment Completed by Imelda RN and Luna RN.    Head WDL  Ears WDL  Nose WDL  Mouth WDL  Neck WDL  Breast/Chest: Mole on left breast  Shoulder Blades: Right, incision with dressing in place, sling immobilizer also in place  Spine WDL  (R) Arm/Elbow/Hand Bruising to upper arm  (L) Arm/Elbow/Hand WDL  Abdomen: discoloration, scars  Groin:discoloration and scars  Scrotum/Coccyx/Buttocks WDL  (R) Leg: Surgical Incision to right hip, dressing in place  (L) Leg WDL  (R) Heel/Foot/Toe WDL  (L) Heel/Foot/Toe WDL          Devices In Places Blood Pressure Cuff and Pulse Ox      Interventions In Place NC W/Ear Foams, Low Air Loss Mattress, Barrier Cream, and Dri-Steven Pads    Possible Skin Injury No    Pictures Uploaded Into Epic N/A  Wound Consult Placed N/A  RN Wound Prevention Protocol Ordered Yes
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
## (undated) DEVICE — GLOVE BIOGEL SZ 7.5 SURGICAL PF LTX - (50PR/BX 4BX/CA)

## (undated) DEVICE — STOCKINETTE IMPERVIOUS 12X48 - STERILELF (10/CA)"

## (undated) DEVICE — SET LEADWIRE 5 LEAD BEDSIDE DISPOSABLE ECG (1SET OF 5/EA)

## (undated) DEVICE — DRESSING AQUACEL AG ADVANTAGE 3.5 X 10" (10EA/BX)"

## (undated) DEVICE — DRAPE STRLE REG TOWEL 18X24 - (10/BX 4BX/CA)"

## (undated) DEVICE — IMMOBILIZER SHOULDER UNIVERSAL - SURGERY ONLY

## (undated) DEVICE — SUCTION INSTRUMENT YANKAUER BULBOUS TIP W/O VENT (50EA/CA)

## (undated) DEVICE — DRAPE 36X28IN RAD CARM BND BG - (25/CA) O

## (undated) DEVICE — DRAPE VAGINAL BIB W/ POUCH (10EA/CA)

## (undated) DEVICE — STOCKINET BIAS 6 IN STERILE - (20/CA)

## (undated) DEVICE — SUTURE 2-0 VICRYL PLUS CT-1 - 8 X 18 INCH(12/BX)

## (undated) DEVICE — LOCKING DRILL 4.2X360MM

## (undated) DEVICE — WATER IRRIGATION STERILE 1000ML (12EA/CA)

## (undated) DEVICE — SENSOR OXIMETER ADULT SPO2 RD SET (20EA/BX)

## (undated) DEVICE — GOWN WARMING STANDARD FLEX - (30/CA)

## (undated) DEVICE — SUTURE GENERAL

## (undated) DEVICE — SUTURE EYE

## (undated) DEVICE — PROBE DIATHERMY DISP 25G (6EA/BX)

## (undated) DEVICE — GLOVE BIOGEL INDICATOR SZ 8 SURGICAL PF LTX - (50/BX 4BX/CA)

## (undated) DEVICE — DRAPE SURGICAL U 77X120 - (10/CA)

## (undated) DEVICE — BAG SPONGE COUNT 10.25 X 32 - BLUE (250/CA)

## (undated) DEVICE — GLOVE SZ 6.5 BIOGEL PI MICRO - PF LF (50PR/BX)

## (undated) DEVICE — CANISTER SUCTION 3000ML MECHANICAL FILTER AUTO SHUTOFF MEDI-VAC NONSTERILE LF DISP  (40EA/CA)

## (undated) DEVICE — KIT ANESTHESIA W/CIRCUIT & 3/LT BAG W/FILTER (20EA/CA)

## (undated) DEVICE — PROTECTOR ULNA NERVE - (36PR/CA)

## (undated) DEVICE — SET EXTENSION WITH 2 PORTS (48EA/CA) ***PART #2C8610 IS A SUBSTITUTE*****

## (undated) DEVICE — DRAPE C ARMOR (12EA/CA)

## (undated) DEVICE — MASK AIRWAY SIZE 3 UNIQUE SILICON (10/BX)

## (undated) DEVICE — SODIUM CHL IRRIGATION 0.9% 1000ML (12EA/CA)

## (undated) DEVICE — GLOVE BIOGEL SZ 9 SURGICAL PF LTX - (50/BX 4BX/CA)

## (undated) DEVICE — GLOVE SZ 7.5 BIOGEL PI MICRO - PF LF (50PR/BX)

## (undated) DEVICE — SUTURE 2-0 VICRYL PLUS CT-1 36 (36PK/BX)"

## (undated) DEVICE — TUBE CONNECT SUCTION CLEAR 120 X 1/4" (50EA/CA)"

## (undated) DEVICE — GLOVE BIOGEL SZ 7 SURGICAL PF LTX - (50PR/BX 4BX/CA)

## (undated) DEVICE — MASK ANESTHESIA ADULT  - (100/CA)

## (undated) DEVICE — BOVIE BLADE COATED - (50/PK)

## (undated) DEVICE — LACTATED RINGERS INJ 1000 ML - (14EA/CA 60CA/PF)

## (undated) DEVICE — DRAPE LARGE 3 QUARTER - (20/CA)

## (undated) DEVICE — GLOVE BIOGEL INDICATOR SZ 7SURGICAL PF LTX - (50/BX 4BX/CA)

## (undated) DEVICE — HANDPIECE 10FT INTPLS SCT PLS IRRIGATION HAND CONTROL SET (6/PK)

## (undated) DEVICE — SUTURE 0 VICRYL PLUS CT-1 - 36 INCH (36/BX)

## (undated) DEVICE — ELECTRODE 850 FOAM ADHESIVE - HYDROGEL RADIOTRNSPRNT (50/PK)

## (undated) DEVICE — KIT  I.V. START (100EA/CA)

## (undated) DEVICE — TUBING CLEARLINK DUO-VENT - C-FLO (48EA/CA)

## (undated) DEVICE — TUBE E-T HI-LO CUFF 7.0MM (10EA/PK)

## (undated) DEVICE — SUTURE 1 VICRYL PLUS CTX - 8 X 18 INCH (12/BX)

## (undated) DEVICE — PACK VITRECTOMY 25G 20K V W (1EA/BX)

## (undated) DEVICE — DRAPE C-ARM LARGE 41IN X 74 IN - (10/BX 2BX/CA)

## (undated) DEVICE — DRAPE U SPLIT IMP 54 X 76 - (24/CA)

## (undated) DEVICE — DRILL BIT

## (undated) DEVICE — GLOVE BIOGEL PI INDICATOR SZ 7.0 SURGICAL PF LF - (50/BX 4BX/CA)

## (undated) DEVICE — DRAPE U ORTHOPEDIC - (10/BX)

## (undated) DEVICE — KIT ROOM DECONTAMINATION

## (undated) DEVICE — PACK VITRECTOMY (1EA/CA)

## (undated) DEVICE — COVER LIGHT HANDLE ALC PLUS DISP (18EA/BX)

## (undated) DEVICE — TOWEL STOP TIMEOUT SAFETY FLAG (40EA/CA)

## (undated) DEVICE — WRAP COBAN SELF-ADHERENT 6 IN X  5YDS STERILE TAN (12/CA)

## (undated) DEVICE — CANISTER SUCTION RIGID RED 1500CC (40EA/CA)

## (undated) DEVICE — GLOVE BIOGEL INDICATOR SZ 7.5 SURGICAL PF LTX - (50PR/BX 4BX/CA)

## (undated) DEVICE — BIT DRILL SHORT W/QC 3.5MM

## (undated) DEVICE — PIN PRECISION 3.2/3.9 X 450MM

## (undated) DEVICE — GOWN SURGEONS LARGE - (32/CA)

## (undated) DEVICE — TOWELS CLOTH SURGICAL - (4/PK 20PK/CA)

## (undated) DEVICE — PADDING CAST 6 IN STERILE - 6 X 4 YDS (24/CA)

## (undated) DEVICE — HEAD HOLDER JUNIOR/ADULT

## (undated) DEVICE — GLOVE BIOGEL PI INDICATOR SZ 6.5 SURGICAL PF LF - (50/BX 4BX/CA)

## (undated) DEVICE — GOWN SURGEONS X-LARGE - DISP. (30/CA)

## (undated) DEVICE — PROBE LASER ILLUMINATED FLEX CURVED 25G (6EA/BX)

## (undated) DEVICE — GLOVE SZ 7 BIOGEL PI MICRO - PF LF (50PR/BX 4BX/CA)

## (undated) DEVICE — SLEEVE, VASO, THIGH, MED

## (undated) DEVICE — TIP INTPLS HFLO ML ORFC BTRY - (12/CS)  FOR SURGILAV

## (undated) DEVICE — ELECTRODE DUAL RETURN W/ CORD - (50/PK)

## (undated) DEVICE — NEPTUNE 4 PORT MANIFOLD - (20/PK)

## (undated) DEVICE — SUTURE 3-0 ETHILON FS-1 - (36/BX) 30 INCH

## (undated) DEVICE — GLOVE BIOGEL PI ORTHO SZ 7.5 PF LF (40PR/BX)

## (undated) DEVICE — DRESSING ABDOMINAL PAD STERILE 8 X 10" (360EA/CA)"

## (undated) DEVICE — BIT DRILL 3.5MM W/QC

## (undated) DEVICE — PACK MAJOR ORTHO - (2EA/CA)

## (undated) DEVICE — CHLORAPREP 26 ML APPLICATOR - ORANGE TINT(25/CA)

## (undated) DEVICE — STAPLER SKIN DISP - (6/BX 10BX/CA) VISISTAT

## (undated) DEVICE — SENSOR SPO2 NEO LNCS ADHESIVE (20/BX) SEE USER NOTES

## (undated) DEVICE — DRESSING LEUKOMED STERILE 11.75X4IN - (50/CA)

## (undated) DEVICE — SUTURE 6-0 PLAIN GUT G-1 D/A 18 (12PK/BX)"

## (undated) DEVICE — DRESSING TRANSPARENT FILM TEGADERM 4 X 4.75" (50EA/BX)"

## (undated) DEVICE — SLEEVE SHOULDER DISP(ARTHREX) - (6/BX)

## (undated) DEVICE — POUCH FLUID COLLECTION INVISISHIELD - (10/BX)

## (undated) DEVICE — GLOVE BIOGEL PI INDICATOR SZ 8.0 SURGICAL PF LF -(50/BX 4BX/CA)

## (undated) DEVICE — DRAPE SURG STERI-DRAPE 7X11OD - (40EA/CA)

## (undated) DEVICE — LENS GRIESHABER MACULAR

## (undated) DEVICE — PENCIL ELECTSURG 10FT BTN SWH - (50/CA)

## (undated) DEVICE — SUCTION INSTRUMENT YANKAUER OPEN TIP W/O VENT (50EA/CA)

## (undated) DEVICE — GUIDEWIRE

## (undated) DEVICE — TIP NEEDLE SOFT 25G X 0.8MM (10EA/BX)

## (undated) DEVICE — STAPLER 35MM SKIN WIDE ROTATING HEAD (6EA/BX)

## (undated) DEVICE — PAD EYE GAUZE COVERED OVAL 1 5/8 X 2 5/8" STERILE"

## (undated) DEVICE — LEAD SET 6 DISP. EKG NIHON KOHDEN (100EA/CA) [9859].

## (undated) DEVICE — SODIUM CHL. IRRIGATION 0.9% 3000ML (4EA/CA 65CA/PF)

## (undated) DEVICE — CORDS BIPOLAR COAGULATION - 12FT STERILE DISP. (10EA/BX)

## (undated) DEVICE — CATHETER IV 20 GA X 1-1/4 ---SURG.& SDS ONLY--- (50EA/BX)